# Patient Record
Sex: MALE | Race: WHITE | Employment: OTHER | URBAN - METROPOLITAN AREA
[De-identification: names, ages, dates, MRNs, and addresses within clinical notes are randomized per-mention and may not be internally consistent; named-entity substitution may affect disease eponyms.]

---

## 2018-12-26 ENCOUNTER — TELEPHONE (OUTPATIENT)
Dept: ADMINISTRATIVE | Age: 58
End: 2018-12-26

## 2019-01-17 ENCOUNTER — TELEPHONE (OUTPATIENT)
Dept: CARDIOLOGY CLINIC | Age: 59
End: 2019-01-17

## 2019-01-17 PROBLEM — I47.1 PSVT (PAROXYSMAL SUPRAVENTRICULAR TACHYCARDIA) (HCC): Status: ACTIVE | Noted: 2019-01-17

## 2019-01-17 PROBLEM — E78.2 MIXED HYPERLIPIDEMIA: Status: ACTIVE | Noted: 2019-01-17

## 2019-01-17 PROBLEM — J44.9 COPD (CHRONIC OBSTRUCTIVE PULMONARY DISEASE) (HCC): Status: ACTIVE | Noted: 2019-01-17

## 2019-01-17 PROBLEM — I47.10 PSVT (PAROXYSMAL SUPRAVENTRICULAR TACHYCARDIA): Status: ACTIVE | Noted: 2019-01-17

## 2019-01-17 PROBLEM — R07.2 PRECORDIAL PAIN: Status: ACTIVE | Noted: 2019-01-17

## 2019-01-17 PROBLEM — I10 ESSENTIAL HYPERTENSION: Status: ACTIVE | Noted: 2019-01-17

## 2019-03-28 ENCOUNTER — TELEPHONE (OUTPATIENT)
Dept: CARDIOLOGY CLINIC | Age: 59
End: 2019-03-28

## 2019-04-22 ENCOUNTER — TELEPHONE (OUTPATIENT)
Dept: ADMINISTRATIVE | Age: 59
End: 2019-04-22

## 2019-04-22 NOTE — TELEPHONE ENCOUNTER
Schedule with dr Mack Constantino on 4/29 at 9:00 am for intermitant cp and bp meds    Cardiac hx scanned   Referral attached  Records rec'd-scanned

## 2019-04-29 ENCOUNTER — OFFICE VISIT (OUTPATIENT)
Dept: CARDIOLOGY CLINIC | Age: 59
End: 2019-04-29
Payer: COMMERCIAL

## 2019-04-29 VITALS
HEART RATE: 71 BPM | BODY MASS INDEX: 33.34 KG/M2 | SYSTOLIC BLOOD PRESSURE: 118 MMHG | RESPIRATION RATE: 16 BRPM | DIASTOLIC BLOOD PRESSURE: 70 MMHG | HEIGHT: 68 IN | WEIGHT: 220 LBS

## 2019-04-29 DIAGNOSIS — R06.02 SOB (SHORTNESS OF BREATH): ICD-10-CM

## 2019-04-29 DIAGNOSIS — I10 ESSENTIAL HYPERTENSION: ICD-10-CM

## 2019-04-29 DIAGNOSIS — E11.8 TYPE 2 DIABETES MELLITUS WITH COMPLICATION, UNSPECIFIED WHETHER LONG TERM INSULIN USE: ICD-10-CM

## 2019-04-29 DIAGNOSIS — R07.2 PRECORDIAL PAIN: Primary | ICD-10-CM

## 2019-04-29 DIAGNOSIS — E78.00 PURE HYPERCHOLESTEROLEMIA: ICD-10-CM

## 2019-04-29 PROCEDURE — 93000 ELECTROCARDIOGRAM COMPLETE: CPT | Performed by: INTERNAL MEDICINE

## 2019-04-29 PROCEDURE — 1036F TOBACCO NON-USER: CPT | Performed by: INTERNAL MEDICINE

## 2019-04-29 PROCEDURE — 99204 OFFICE O/P NEW MOD 45 MIN: CPT | Performed by: INTERNAL MEDICINE

## 2019-04-29 PROCEDURE — 3017F COLORECTAL CA SCREEN DOC REV: CPT | Performed by: INTERNAL MEDICINE

## 2019-04-29 PROCEDURE — G8427 DOCREV CUR MEDS BY ELIG CLIN: HCPCS | Performed by: INTERNAL MEDICINE

## 2019-04-29 PROCEDURE — 2022F DILAT RTA XM EVC RTNOPTHY: CPT | Performed by: INTERNAL MEDICINE

## 2019-04-29 PROCEDURE — 3046F HEMOGLOBIN A1C LEVEL >9.0%: CPT | Performed by: INTERNAL MEDICINE

## 2019-04-29 PROCEDURE — G8417 CALC BMI ABV UP PARAM F/U: HCPCS | Performed by: INTERNAL MEDICINE

## 2019-04-29 RX ORDER — SPIRONOLACTONE 25 MG/1
TABLET ORAL
Refills: 1 | COMMUNITY
Start: 2019-04-21 | End: 2021-10-15

## 2019-04-29 RX ORDER — GLUCOSAMINE/CHONDR SU A SOD 750-600 MG
TABLET ORAL
Refills: 0 | COMMUNITY
Start: 2019-04-24 | End: 2021-10-15

## 2019-04-29 RX ORDER — ALCOHOL 62 ML/100ML
LIQUID TOPICAL
Refills: 1 | COMMUNITY
Start: 2019-04-24 | End: 2021-10-15

## 2019-04-29 RX ORDER — ATORVASTATIN CALCIUM 20 MG/1
20 TABLET, FILM COATED ORAL DAILY
Refills: 0 | COMMUNITY
Start: 2019-04-21 | End: 2021-10-15

## 2019-04-29 RX ORDER — FLUTICASONE PROPIONATE 50 MCG
1 SPRAY, SUSPENSION (ML) NASAL DAILY
Refills: 0 | COMMUNITY
Start: 2019-04-21 | End: 2021-10-15

## 2019-04-29 RX ORDER — SERTRALINE HYDROCHLORIDE 100 MG/1
TABLET, FILM COATED ORAL
Refills: 1 | COMMUNITY
Start: 2019-04-21 | End: 2021-10-15

## 2019-04-29 RX ORDER — OMEPRAZOLE 40 MG/1
CAPSULE, DELAYED RELEASE ORAL
Refills: 1 | COMMUNITY
Start: 2019-04-21 | End: 2021-10-15

## 2019-04-29 RX ORDER — NAPROXEN 500 MG/1
500 TABLET ORAL 2 TIMES DAILY WITH MEALS
Refills: 0 | COMMUNITY
Start: 2019-04-21 | End: 2021-10-15

## 2019-04-29 SDOH — HEALTH STABILITY: MENTAL HEALTH: HOW OFTEN DO YOU HAVE A DRINK CONTAINING ALCOHOL?: NEVER

## 2019-04-29 NOTE — PROGRESS NOTES
CHIEF COMPLAINT: CP/SOB/Palpitations    HISTORY OF PRESENT ILLNESS: Patient is a 66 y.o. male seen at the request of No primary care provider on file. Patient complains of chest pain. Onset was several months ago, with unchanged course since that time. The patient describes the pain as intermittent, precordial in nature, radiates to the left shoulder. Patient rates pain as a moderate in intensity. Associated symptoms are chest pain and dyspnea. Aggravating factors are exercise. Alleviating factors are: rest. Patient's cardiac risk factors are advanced age (older than 54 for men, 72 for women), diabetes mellitus, dyslipidemia, family history of premature cardiovascular disease, hypertension, male gender, obesity (BMI >= 30 kg/m2), sedentary lifestyle and smoking/ tobacco exposure.       Past Medical History:   Diagnosis Date    Diabetes mellitus ([facility] [address]     Hyperlipidemia     Hypertension        Patient Active Problem List   Diagnosis    COPD (chronic obstructive pulmonary disease) (HCC)    Precordial pain    Essential hypertension    Mixed hyperlipidemia    PSVT (paroxysmal supraventricular tachycardia) (HCC)    Hypertension    Hyperlipidemia    Diabetes mellitus (HCC)       No Known Allergies    Current Outpatient Medications   Medication Sig Dispense Refill    RA LORATADINE 10 MG tablet   1    metoprolol tartrate (LOPRESSOR) 25 MG tablet take 1 tablet by mouth twice a day  1    sertraline (ZOLOFT) 100 MG tablet take 1 and 1/2 tablets by mouth every evening  1    spironolactone (ALDACTONE) 25 MG tablet take 1 tablet by mouth once daily  1    metFORMIN (GLUCOPHAGE) 1000 MG tablet Take 1,000 mg by mouth 2 times daily (with meals)   0    omeprazole (PRILOSEC) 40 MG delayed release capsule take 1 capsule by mouth once daily before meals  1    naproxen (NAPROSYN) 500 MG tablet Take 500 mg by mouth 2 times daily (with meals)   0    RA VITAMIN D-3 2000 units CAPS   0    ipratropium (ATROVENT) 0.02 % nebulizer solution inhale contents of 1 vial in nebulizer every 6 hours if needed  1    fluticasone (FLONASE) 50 MCG/ACT nasal spray 1 spray by Nasal route daily   0    atorvastatin (LIPITOR) 20 MG tablet 20 mg daily   0    aspirin 81 MG tablet Take 81 mg by mouth daily      Tiotropium Bromide-Olodaterol (STIOLTO RESPIMAT) 2.5-2.5 MCG/ACT AERS Inhale into the lungs       No current facility-administered medications for this visit. Social History     Socioeconomic History    Marital status: Single     Spouse name: Not on file    Number of children: Not on file    Years of education: Not on file    Highest education level: Not on file   Occupational History    Not on file   Social Needs    Financial resource strain: Not on file    Food insecurity:     Worry: Not on file     Inability: Not on file    Transportation needs:     Medical: Not on file     Non-medical: Not on file   Tobacco Use    Smoking status: Former Smoker     Last attempt to quit: 2019     Years since quittin.2    Smokeless tobacco: Never Used   Substance and Sexual Activity    Alcohol use: Never     Frequency: Never    Drug use: Never    Sexual activity: Not on file   Lifestyle    Physical activity:     Days per week: Not on file     Minutes per session: Not on file    Stress: Not on file   Relationships    Social connections:     Talks on phone: Not on file     Gets together: Not on file     Attends Hoahaoism service: Not on file     Active member of club or organization: Not on file     Attends meetings of clubs or organizations: Not on file     Relationship status: Not on file    Intimate partner violence:     Fear of current or ex partner: Not on file     Emotionally abused: Not on file     Physically abused: Not on file     Forced sexual activity: Not on file   Other Topics Concern    Not on file   Social History Narrative    Not on file       History reviewed. No pertinent family history.     Review of (paroxysmal supraventricular tachycardia) ([facility] [address]    Hypertension    Hyperlipidemia    Diabetes mellitus ([facility] [address]     1. Chest pain/SOB:     Stress and echo. ASA/BB/statin. 2. Palpitations: Observe. 3. COPD    4. DM: Per PCP. 5. HTN: Observe. 6. Lipids: Statin. William Nation D.O.   Cardiologist  Cardiology, [facility]

## 2019-05-09 ENCOUNTER — TELEPHONE (OUTPATIENT)
Dept: CARDIOLOGY | Age: 59
End: 2019-05-09

## 2019-06-03 ENCOUNTER — TELEPHONE (OUTPATIENT)
Dept: CARDIOLOGY CLINIC | Age: 59
End: 2019-06-03

## 2019-07-01 ENCOUNTER — TELEPHONE (OUTPATIENT)
Dept: CARDIOLOGY | Age: 59
End: 2019-07-01

## 2020-11-03 PROBLEM — I10 ESSENTIAL HYPERTENSION: Status: RESOLVED | Noted: 2019-01-17 | Resolved: 2020-11-03

## 2021-10-14 ENCOUNTER — APPOINTMENT (OUTPATIENT)
Dept: GENERAL RADIOLOGY | Age: 61
DRG: 710 | End: 2021-10-14
Payer: COMMERCIAL

## 2021-10-14 PROCEDURE — 99282 EMERGENCY DEPT VISIT SF MDM: CPT

## 2021-10-14 PROCEDURE — 73110 X-RAY EXAM OF WRIST: CPT

## 2021-10-14 PROCEDURE — 73060 X-RAY EXAM OF HUMERUS: CPT

## 2021-10-14 PROCEDURE — 73080 X-RAY EXAM OF ELBOW: CPT

## 2021-10-14 PROCEDURE — 73130 X-RAY EXAM OF HAND: CPT

## 2021-10-14 PROCEDURE — 73090 X-RAY EXAM OF FOREARM: CPT

## 2021-10-14 ASSESSMENT — PAIN SCALES - GENERAL: PAINLEVEL_OUTOF10: 8

## 2021-10-15 ENCOUNTER — ANESTHESIA EVENT (OUTPATIENT)
Dept: OPERATING ROOM | Age: 61
DRG: 710 | End: 2021-10-15
Payer: COMMERCIAL

## 2021-10-15 ENCOUNTER — HOSPITAL ENCOUNTER (INPATIENT)
Age: 61
LOS: 4 days | Discharge: HOME HEALTH | DRG: 710 | End: 2021-10-19
Attending: EMERGENCY MEDICINE | Admitting: INTERNAL MEDICINE
Payer: COMMERCIAL

## 2021-10-15 ENCOUNTER — ANESTHESIA (OUTPATIENT)
Dept: OPERATING ROOM | Age: 61
DRG: 710 | End: 2021-10-15
Payer: COMMERCIAL

## 2021-10-15 VITALS
OXYGEN SATURATION: 99 % | TEMPERATURE: 97.5 F | RESPIRATION RATE: 16 BRPM | DIASTOLIC BLOOD PRESSURE: 76 MMHG | SYSTOLIC BLOOD PRESSURE: 150 MMHG

## 2021-10-15 DIAGNOSIS — A41.9 SEPSIS DUE TO CELLULITIS (HCC): ICD-10-CM

## 2021-10-15 DIAGNOSIS — G89.18 POST-OPERATIVE PAIN: ICD-10-CM

## 2021-10-15 DIAGNOSIS — S59.902A INJURY OF LEFT ELBOW, INITIAL ENCOUNTER: Primary | ICD-10-CM

## 2021-10-15 DIAGNOSIS — L03.90 SEPSIS DUE TO CELLULITIS (HCC): ICD-10-CM

## 2021-10-15 DIAGNOSIS — L03.114 LEFT ARM CELLULITIS: ICD-10-CM

## 2021-10-15 LAB
ANION GAP SERPL CALCULATED.3IONS-SCNC: 18 MMOL/L (ref 7–16)
BASOPHILS ABSOLUTE: 0.04 E9/L (ref 0–0.2)
BASOPHILS RELATIVE PERCENT: 0.3 % (ref 0–2)
BUN BLDV-MCNC: 11 MG/DL (ref 6–20)
C-REACTIVE PROTEIN: 22.8 MG/DL (ref 0–0.4)
CALCIUM SERPL-MCNC: 9.4 MG/DL (ref 8.6–10.2)
CHLORIDE BLD-SCNC: 93 MMOL/L (ref 98–107)
CO2: 17 MMOL/L (ref 22–29)
CREAT SERPL-MCNC: 0.8 MG/DL (ref 0.7–1.2)
EOSINOPHILS ABSOLUTE: 0.03 E9/L (ref 0.05–0.5)
EOSINOPHILS RELATIVE PERCENT: 0.2 % (ref 0–6)
GFR AFRICAN AMERICAN: >60
GFR NON-AFRICAN AMERICAN: >60 ML/MIN/1.73
GLUCOSE BLD-MCNC: 128 MG/DL (ref 74–99)
HBA1C MFR BLD: 5.7 % (ref 4–5.6)
HCT VFR BLD CALC: 40.7 % (ref 37–54)
HEMOGLOBIN: 13.4 G/DL (ref 12.5–16.5)
IMMATURE GRANULOCYTES #: 0.14 E9/L
IMMATURE GRANULOCYTES %: 1 % (ref 0–5)
LYMPHOCYTES ABSOLUTE: 1.69 E9/L (ref 1.5–4)
LYMPHOCYTES RELATIVE PERCENT: 12.3 % (ref 20–42)
MCH RBC QN AUTO: 31.1 PG (ref 26–35)
MCHC RBC AUTO-ENTMCNC: 32.9 % (ref 32–34.5)
MCV RBC AUTO: 94.4 FL (ref 80–99.9)
METER GLUCOSE: 126 MG/DL (ref 74–99)
METER GLUCOSE: 135 MG/DL (ref 74–99)
METER GLUCOSE: 197 MG/DL (ref 74–99)
MONOCYTES ABSOLUTE: 1.59 E9/L (ref 0.1–0.95)
MONOCYTES RELATIVE PERCENT: 11.6 % (ref 2–12)
NEUTROPHILS ABSOLUTE: 10.2 E9/L (ref 1.8–7.3)
NEUTROPHILS RELATIVE PERCENT: 74.6 % (ref 43–80)
PDW BLD-RTO: 14.1 FL (ref 11.5–15)
PLATELET # BLD: 236 E9/L (ref 130–450)
PMV BLD AUTO: 9.4 FL (ref 7–12)
POTASSIUM REFLEX MAGNESIUM: 5.2 MMOL/L (ref 3.5–5)
PROCALCITONIN: 0.08 NG/ML (ref 0–0.08)
RBC # BLD: 4.31 E12/L (ref 3.8–5.8)
REASON FOR REJECTION: NORMAL
REJECTED TEST: NORMAL
SEDIMENTATION RATE, ERYTHROCYTE: 58 MM/HR (ref 0–15)
SODIUM BLD-SCNC: 128 MMOL/L (ref 132–146)
WBC # BLD: 13.7 E9/L (ref 4.5–11.5)

## 2021-10-15 PROCEDURE — 2580000003 HC RX 258: Performed by: PHYSICIAN ASSISTANT

## 2021-10-15 PROCEDURE — 99254 IP/OBS CNSLTJ NEW/EST MOD 60: CPT | Performed by: ORTHOPAEDIC SURGERY

## 2021-10-15 PROCEDURE — 6360000002 HC RX W HCPCS: Performed by: SPECIALIST

## 2021-10-15 PROCEDURE — 6360000002 HC RX W HCPCS

## 2021-10-15 PROCEDURE — 87147 CULTURE TYPE IMMUNOLOGIC: CPT

## 2021-10-15 PROCEDURE — 2580000003 HC RX 258: Performed by: EMERGENCY MEDICINE

## 2021-10-15 PROCEDURE — 87206 SMEAR FLUORESCENT/ACID STAI: CPT

## 2021-10-15 PROCEDURE — 89051 BODY FLUID CELL COUNT: CPT

## 2021-10-15 PROCEDURE — 0MB40ZZ EXCISION OF LEFT ELBOW BURSA AND LIGAMENT, OPEN APPROACH: ICD-10-PCS | Performed by: ORTHOPAEDIC SURGERY

## 2021-10-15 PROCEDURE — 83036 HEMOGLOBIN GLYCOSYLATED A1C: CPT

## 2021-10-15 PROCEDURE — 85025 COMPLETE CBC W/AUTO DIFF WBC: CPT

## 2021-10-15 PROCEDURE — 6360000002 HC RX W HCPCS: Performed by: EMERGENCY MEDICINE

## 2021-10-15 PROCEDURE — 87070 CULTURE OTHR SPECIMN AEROBIC: CPT

## 2021-10-15 PROCEDURE — 6370000000 HC RX 637 (ALT 250 FOR IP): Performed by: PHYSICIAN ASSISTANT

## 2021-10-15 PROCEDURE — 36415 COLL VENOUS BLD VENIPUNCTURE: CPT

## 2021-10-15 PROCEDURE — 7100000000 HC PACU RECOVERY - FIRST 15 MIN: Performed by: ORTHOPAEDIC SURGERY

## 2021-10-15 PROCEDURE — 2500000003 HC RX 250 WO HCPCS: Performed by: ORTHOPAEDIC SURGERY

## 2021-10-15 PROCEDURE — 87102 FUNGUS ISOLATION CULTURE: CPT

## 2021-10-15 PROCEDURE — 6360000002 HC RX W HCPCS: Performed by: NURSE PRACTITIONER

## 2021-10-15 PROCEDURE — 80048 BASIC METABOLIC PNL TOTAL CA: CPT

## 2021-10-15 PROCEDURE — 87205 SMEAR GRAM STAIN: CPT

## 2021-10-15 PROCEDURE — 2709999900 HC NON-CHARGEABLE SUPPLY: Performed by: ORTHOPAEDIC SURGERY

## 2021-10-15 PROCEDURE — 3600000012 HC SURGERY LEVEL 2 ADDTL 15MIN: Performed by: ORTHOPAEDIC SURGERY

## 2021-10-15 PROCEDURE — 86140 C-REACTIVE PROTEIN: CPT

## 2021-10-15 PROCEDURE — 82962 GLUCOSE BLOOD TEST: CPT

## 2021-10-15 PROCEDURE — 99223 1ST HOSP IP/OBS HIGH 75: CPT | Performed by: INTERNAL MEDICINE

## 2021-10-15 PROCEDURE — 87077 CULTURE AEROBIC IDENTIFY: CPT

## 2021-10-15 PROCEDURE — 6360000002 HC RX W HCPCS: Performed by: INTERNAL MEDICINE

## 2021-10-15 PROCEDURE — 87015 SPECIMEN INFECT AGNT CONCNTJ: CPT

## 2021-10-15 PROCEDURE — 6370000000 HC RX 637 (ALT 250 FOR IP): Performed by: INTERNAL MEDICINE

## 2021-10-15 PROCEDURE — 3700000000 HC ANESTHESIA ATTENDED CARE: Performed by: ORTHOPAEDIC SURGERY

## 2021-10-15 PROCEDURE — 2580000003 HC RX 258: Performed by: INTERNAL MEDICINE

## 2021-10-15 PROCEDURE — 87186 SC STD MICRODIL/AGAR DIL: CPT

## 2021-10-15 PROCEDURE — 2580000003 HC RX 258: Performed by: SPECIALIST

## 2021-10-15 PROCEDURE — 89060 EXAM SYNOVIAL FLUID CRYSTALS: CPT

## 2021-10-15 PROCEDURE — 90471 IMMUNIZATION ADMIN: CPT | Performed by: NURSE PRACTITIONER

## 2021-10-15 PROCEDURE — 6360000002 HC RX W HCPCS: Performed by: ANESTHESIOLOGY

## 2021-10-15 PROCEDURE — 87116 MYCOBACTERIA CULTURE: CPT

## 2021-10-15 PROCEDURE — 3600000002 HC SURGERY LEVEL 2 BASE: Performed by: ORTHOPAEDIC SURGERY

## 2021-10-15 PROCEDURE — 85651 RBC SED RATE NONAUTOMATED: CPT

## 2021-10-15 PROCEDURE — 2500000003 HC RX 250 WO HCPCS

## 2021-10-15 PROCEDURE — 7100000001 HC PACU RECOVERY - ADDTL 15 MIN: Performed by: ORTHOPAEDIC SURGERY

## 2021-10-15 PROCEDURE — 3700000001 HC ADD 15 MINUTES (ANESTHESIA): Performed by: ORTHOPAEDIC SURGERY

## 2021-10-15 PROCEDURE — 24105 EXCISION OLECRANON BURSA: CPT | Performed by: ORTHOPAEDIC SURGERY

## 2021-10-15 PROCEDURE — 90715 TDAP VACCINE 7 YRS/> IM: CPT | Performed by: NURSE PRACTITIONER

## 2021-10-15 PROCEDURE — 2580000003 HC RX 258: Performed by: ORTHOPAEDIC SURGERY

## 2021-10-15 PROCEDURE — 87040 BLOOD CULTURE FOR BACTERIA: CPT

## 2021-10-15 PROCEDURE — 84145 PROCALCITONIN (PCT): CPT

## 2021-10-15 PROCEDURE — 6370000000 HC RX 637 (ALT 250 FOR IP): Performed by: ANESTHESIOLOGY

## 2021-10-15 PROCEDURE — 1200000000 HC SEMI PRIVATE

## 2021-10-15 RX ORDER — DIPHENHYDRAMINE HYDROCHLORIDE 50 MG/ML
12.5 INJECTION INTRAMUSCULAR; INTRAVENOUS
Status: DISCONTINUED | OUTPATIENT
Start: 2021-10-15 | End: 2021-10-15

## 2021-10-15 RX ORDER — MORPHINE SULFATE 2 MG/ML
4 INJECTION, SOLUTION INTRAMUSCULAR; INTRAVENOUS EVERY 4 HOURS PRN
Status: DISCONTINUED | OUTPATIENT
Start: 2021-10-15 | End: 2021-10-19 | Stop reason: HOSPADM

## 2021-10-15 RX ORDER — SERTRALINE HYDROCHLORIDE 100 MG/1
100 TABLET, FILM COATED ORAL NIGHTLY
Status: DISCONTINUED | OUTPATIENT
Start: 2021-10-15 | End: 2021-10-19 | Stop reason: HOSPADM

## 2021-10-15 RX ORDER — SODIUM CHLORIDE 0.9 % (FLUSH) 0.9 %
5-40 SYRINGE (ML) INJECTION EVERY 12 HOURS SCHEDULED
Status: DISCONTINUED | OUTPATIENT
Start: 2021-10-15 | End: 2021-10-15

## 2021-10-15 RX ORDER — LIDOCAINE HYDROCHLORIDE 20 MG/ML
INJECTION, SOLUTION EPIDURAL; INFILTRATION; INTRACAUDAL; PERINEURAL PRN
Status: DISCONTINUED | OUTPATIENT
Start: 2021-10-15 | End: 2021-10-15 | Stop reason: SDUPTHER

## 2021-10-15 RX ORDER — FOLIC ACID 1 MG/1
1 TABLET ORAL DAILY
COMMUNITY

## 2021-10-15 RX ORDER — NICOTINE POLACRILEX 4 MG
15 LOZENGE BUCCAL PRN
Status: DISCONTINUED | OUTPATIENT
Start: 2021-10-15 | End: 2021-10-19 | Stop reason: HOSPADM

## 2021-10-15 RX ORDER — DEXAMETHASONE SODIUM PHOSPHATE 4 MG/ML
INJECTION, SOLUTION INTRA-ARTICULAR; INTRALESIONAL; INTRAMUSCULAR; INTRAVENOUS; SOFT TISSUE PRN
Status: DISCONTINUED | OUTPATIENT
Start: 2021-10-15 | End: 2021-10-15 | Stop reason: SDUPTHER

## 2021-10-15 RX ORDER — SPIRONOLACTONE 25 MG/1
25 TABLET ORAL DAILY
COMMUNITY

## 2021-10-15 RX ORDER — NEOSTIGMINE METHYLSULFATE 1 MG/ML
INJECTION, SOLUTION INTRAVENOUS PRN
Status: DISCONTINUED | OUTPATIENT
Start: 2021-10-15 | End: 2021-10-15 | Stop reason: SDUPTHER

## 2021-10-15 RX ORDER — SERTRALINE HYDROCHLORIDE 100 MG/1
100 TABLET, FILM COATED ORAL NIGHTLY
COMMUNITY

## 2021-10-15 RX ORDER — PROPOFOL 10 MG/ML
INJECTION, EMULSION INTRAVENOUS PRN
Status: DISCONTINUED | OUTPATIENT
Start: 2021-10-15 | End: 2021-10-15 | Stop reason: SDUPTHER

## 2021-10-15 RX ORDER — LISINOPRIL 5 MG/1
5 TABLET ORAL DAILY
Status: DISCONTINUED | OUTPATIENT
Start: 2021-10-15 | End: 2021-10-19 | Stop reason: HOSPADM

## 2021-10-15 RX ORDER — OXYCODONE HYDROCHLORIDE AND ACETAMINOPHEN 5; 325 MG/1; MG/1
2 TABLET ORAL EVERY 4 HOURS PRN
Status: DISCONTINUED | OUTPATIENT
Start: 2021-10-15 | End: 2021-10-19 | Stop reason: HOSPADM

## 2021-10-15 RX ORDER — OXYCODONE HYDROCHLORIDE AND ACETAMINOPHEN 5; 325 MG/1; MG/1
1 TABLET ORAL EVERY 6 HOURS PRN
Qty: 28 TABLET | Refills: 0 | Status: SHIPPED | OUTPATIENT
Start: 2021-10-15 | End: 2021-10-26 | Stop reason: SDUPTHER

## 2021-10-15 RX ORDER — MORPHINE SULFATE 2 MG/ML
2 INJECTION, SOLUTION INTRAMUSCULAR; INTRAVENOUS EVERY 4 HOURS PRN
Status: DISCONTINUED | OUTPATIENT
Start: 2021-10-15 | End: 2021-10-19 | Stop reason: HOSPADM

## 2021-10-15 RX ORDER — GLIPIZIDE 10 MG/1
10 TABLET ORAL
COMMUNITY

## 2021-10-15 RX ORDER — MEPERIDINE HYDROCHLORIDE 25 MG/ML
12.5 INJECTION INTRAMUSCULAR; INTRAVENOUS; SUBCUTANEOUS EVERY 10 MIN PRN
Status: DISCONTINUED | OUTPATIENT
Start: 2021-10-15 | End: 2021-10-15

## 2021-10-15 RX ORDER — SODIUM CHLORIDE 0.9 % (FLUSH) 0.9 %
5-40 SYRINGE (ML) INJECTION EVERY 12 HOURS SCHEDULED
Status: DISCONTINUED | OUTPATIENT
Start: 2021-10-15 | End: 2021-10-19 | Stop reason: HOSPADM

## 2021-10-15 RX ORDER — SODIUM CHLORIDE 9 MG/ML
25 INJECTION, SOLUTION INTRAVENOUS PRN
Status: DISCONTINUED | OUTPATIENT
Start: 2021-10-15 | End: 2021-10-19 | Stop reason: HOSPADM

## 2021-10-15 RX ORDER — GLIPIZIDE 5 MG/1
10 TABLET ORAL
Status: DISCONTINUED | OUTPATIENT
Start: 2021-10-15 | End: 2021-10-19 | Stop reason: HOSPADM

## 2021-10-15 RX ORDER — MIDAZOLAM HYDROCHLORIDE 1 MG/ML
INJECTION INTRAMUSCULAR; INTRAVENOUS PRN
Status: DISCONTINUED | OUTPATIENT
Start: 2021-10-15 | End: 2021-10-15 | Stop reason: SDUPTHER

## 2021-10-15 RX ORDER — ALBUTEROL SULFATE 2.5 MG/3ML
2.5 SOLUTION RESPIRATORY (INHALATION) EVERY 4 HOURS PRN
Status: DISCONTINUED | OUTPATIENT
Start: 2021-10-15 | End: 2021-10-19 | Stop reason: HOSPADM

## 2021-10-15 RX ORDER — ACETAMINOPHEN 325 MG/1
650 TABLET ORAL EVERY 6 HOURS PRN
Status: DISCONTINUED | OUTPATIENT
Start: 2021-10-15 | End: 2021-10-19 | Stop reason: HOSPADM

## 2021-10-15 RX ORDER — LISINOPRIL 5 MG/1
5 TABLET ORAL DAILY
COMMUNITY

## 2021-10-15 RX ORDER — SODIUM CHLORIDE 9 MG/ML
INJECTION, SOLUTION INTRAVENOUS CONTINUOUS
Status: DISCONTINUED | OUTPATIENT
Start: 2021-10-15 | End: 2021-10-15

## 2021-10-15 RX ORDER — ONDANSETRON 2 MG/ML
4 INJECTION INTRAMUSCULAR; INTRAVENOUS EVERY 6 HOURS PRN
Status: DISCONTINUED | OUTPATIENT
Start: 2021-10-15 | End: 2021-10-19 | Stop reason: HOSPADM

## 2021-10-15 RX ORDER — OXYCODONE HYDROCHLORIDE AND ACETAMINOPHEN 5; 325 MG/1; MG/1
1 TABLET ORAL EVERY 4 HOURS PRN
Status: DISCONTINUED | OUTPATIENT
Start: 2021-10-15 | End: 2021-10-19 | Stop reason: HOSPADM

## 2021-10-15 RX ORDER — FENTANYL CITRATE 50 UG/ML
INJECTION, SOLUTION INTRAMUSCULAR; INTRAVENOUS PRN
Status: DISCONTINUED | OUTPATIENT
Start: 2021-10-15 | End: 2021-10-15 | Stop reason: SDUPTHER

## 2021-10-15 RX ORDER — POLYETHYLENE GLYCOL 3350 17 G/17G
17 POWDER, FOR SOLUTION ORAL DAILY PRN
Status: DISCONTINUED | OUTPATIENT
Start: 2021-10-15 | End: 2021-10-19 | Stop reason: HOSPADM

## 2021-10-15 RX ORDER — FENTANYL CITRATE 50 UG/ML
25 INJECTION, SOLUTION INTRAMUSCULAR; INTRAVENOUS EVERY 5 MIN PRN
Status: DISCONTINUED | OUTPATIENT
Start: 2021-10-15 | End: 2021-10-15

## 2021-10-15 RX ORDER — SODIUM CHLORIDE 9 MG/ML
25 INJECTION, SOLUTION INTRAVENOUS PRN
Status: DISCONTINUED | OUTPATIENT
Start: 2021-10-15 | End: 2021-10-15

## 2021-10-15 RX ORDER — MONTELUKAST SODIUM 10 MG/1
10 TABLET ORAL NIGHTLY
COMMUNITY

## 2021-10-15 RX ORDER — DEXTROSE MONOHYDRATE 50 MG/ML
100 INJECTION, SOLUTION INTRAVENOUS PRN
Status: DISCONTINUED | OUTPATIENT
Start: 2021-10-15 | End: 2021-10-19 | Stop reason: HOSPADM

## 2021-10-15 RX ORDER — ACETAMINOPHEN 500 MG
500 TABLET ORAL ONCE
Status: COMPLETED | OUTPATIENT
Start: 2021-10-15 | End: 2021-10-15

## 2021-10-15 RX ORDER — PANTOPRAZOLE SODIUM 40 MG/1
40 TABLET, DELAYED RELEASE ORAL
Status: DISCONTINUED | OUTPATIENT
Start: 2021-10-16 | End: 2021-10-19 | Stop reason: HOSPADM

## 2021-10-15 RX ORDER — ACETAMINOPHEN 650 MG/1
650 SUPPOSITORY RECTAL EVERY 6 HOURS PRN
Status: DISCONTINUED | OUTPATIENT
Start: 2021-10-15 | End: 2021-10-19 | Stop reason: HOSPADM

## 2021-10-15 RX ORDER — GLYCOPYRROLATE 1 MG/5 ML
SYRINGE (ML) INTRAVENOUS PRN
Status: DISCONTINUED | OUTPATIENT
Start: 2021-10-15 | End: 2021-10-15 | Stop reason: SDUPTHER

## 2021-10-15 RX ORDER — PROMETHAZINE HYDROCHLORIDE 25 MG/ML
6.25 INJECTION, SOLUTION INTRAMUSCULAR; INTRAVENOUS PRN
Status: DISCONTINUED | OUTPATIENT
Start: 2021-10-15 | End: 2021-10-15

## 2021-10-15 RX ORDER — SODIUM CHLORIDE 0.9 % (FLUSH) 0.9 %
5-40 SYRINGE (ML) INJECTION PRN
Status: DISCONTINUED | OUTPATIENT
Start: 2021-10-15 | End: 2021-10-19 | Stop reason: HOSPADM

## 2021-10-15 RX ORDER — SODIUM CHLORIDE 0.9 % (FLUSH) 0.9 %
5-40 SYRINGE (ML) INJECTION PRN
Status: DISCONTINUED | OUTPATIENT
Start: 2021-10-15 | End: 2021-10-15

## 2021-10-15 RX ORDER — SODIUM CHLORIDE 9 MG/ML
INJECTION, SOLUTION INTRAVENOUS CONTINUOUS
Status: DISCONTINUED | OUTPATIENT
Start: 2021-10-15 | End: 2021-10-19 | Stop reason: HOSPADM

## 2021-10-15 RX ORDER — MONTELUKAST SODIUM 10 MG/1
10 TABLET ORAL NIGHTLY
Status: DISCONTINUED | OUTPATIENT
Start: 2021-10-15 | End: 2021-10-19 | Stop reason: HOSPADM

## 2021-10-15 RX ORDER — 0.9 % SODIUM CHLORIDE 0.9 %
1000 INTRAVENOUS SOLUTION INTRAVENOUS ONCE
Status: COMPLETED | OUTPATIENT
Start: 2021-10-15 | End: 2021-10-15

## 2021-10-15 RX ORDER — NAPROXEN 500 MG/1
500 TABLET ORAL 2 TIMES DAILY WITH MEALS
Status: ON HOLD | COMMUNITY
End: 2021-10-19 | Stop reason: HOSPADM

## 2021-10-15 RX ORDER — SUCCINYLCHOLINE/SOD CL,ISO/PF 200MG/10ML
SYRINGE (ML) INTRAVENOUS PRN
Status: DISCONTINUED | OUTPATIENT
Start: 2021-10-15 | End: 2021-10-15 | Stop reason: SDUPTHER

## 2021-10-15 RX ORDER — OXYCODONE HYDROCHLORIDE AND ACETAMINOPHEN 5; 325 MG/1; MG/1
1 TABLET ORAL PRN
Status: DISCONTINUED | OUTPATIENT
Start: 2021-10-15 | End: 2021-10-15

## 2021-10-15 RX ORDER — DEXTROSE MONOHYDRATE 25 G/50ML
12.5 INJECTION, SOLUTION INTRAVENOUS PRN
Status: DISCONTINUED | OUTPATIENT
Start: 2021-10-15 | End: 2021-10-19 | Stop reason: HOSPADM

## 2021-10-15 RX ORDER — ONDANSETRON 4 MG/1
4 TABLET, ORALLY DISINTEGRATING ORAL EVERY 8 HOURS PRN
Status: DISCONTINUED | OUTPATIENT
Start: 2021-10-15 | End: 2021-10-19 | Stop reason: HOSPADM

## 2021-10-15 RX ORDER — ONDANSETRON 2 MG/ML
INJECTION INTRAMUSCULAR; INTRAVENOUS PRN
Status: DISCONTINUED | OUTPATIENT
Start: 2021-10-15 | End: 2021-10-15 | Stop reason: SDUPTHER

## 2021-10-15 RX ORDER — BUPIVACAINE HYDROCHLORIDE AND EPINEPHRINE 5; 5 MG/ML; UG/ML
INJECTION, SOLUTION EPIDURAL; INTRACAUDAL; PERINEURAL PRN
Status: DISCONTINUED | OUTPATIENT
Start: 2021-10-15 | End: 2021-10-15 | Stop reason: ALTCHOICE

## 2021-10-15 RX ORDER — OMEPRAZOLE 40 MG/1
40 CAPSULE, DELAYED RELEASE ORAL DAILY
COMMUNITY

## 2021-10-15 RX ORDER — ALBUTEROL SULFATE 2.5 MG/3ML
2.5 SOLUTION RESPIRATORY (INHALATION) ONCE
Status: COMPLETED | OUTPATIENT
Start: 2021-10-15 | End: 2021-10-15

## 2021-10-15 RX ORDER — NICOTINE 21 MG/24HR
1 PATCH, TRANSDERMAL 24 HOURS TRANSDERMAL DAILY
Status: DISCONTINUED | OUTPATIENT
Start: 2021-10-15 | End: 2021-10-19 | Stop reason: HOSPADM

## 2021-10-15 RX ORDER — ROCURONIUM BROMIDE 10 MG/ML
INJECTION, SOLUTION INTRAVENOUS PRN
Status: DISCONTINUED | OUTPATIENT
Start: 2021-10-15 | End: 2021-10-15 | Stop reason: SDUPTHER

## 2021-10-15 RX ORDER — ASPIRIN 81 MG/1
81 TABLET, CHEWABLE ORAL DAILY
COMMUNITY

## 2021-10-15 RX ADMIN — FENTANYL CITRATE 50 MCG: 50 INJECTION, SOLUTION INTRAMUSCULAR; INTRAVENOUS at 14:39

## 2021-10-15 RX ADMIN — Medication 3 MG: at 14:54

## 2021-10-15 RX ADMIN — ROCURONIUM BROMIDE 10 MG: 10 INJECTION, SOLUTION INTRAVENOUS at 14:31

## 2021-10-15 RX ADMIN — VANCOMYCIN HYDROCHLORIDE 1250 MG: 10 INJECTION, POWDER, LYOPHILIZED, FOR SOLUTION INTRAVENOUS at 18:07

## 2021-10-15 RX ADMIN — PROPOFOL 50 MG: 10 INJECTION, EMULSION INTRAVENOUS at 14:38

## 2021-10-15 RX ADMIN — ENOXAPARIN SODIUM 40 MG: 40 INJECTION SUBCUTANEOUS at 08:39

## 2021-10-15 RX ADMIN — LIDOCAINE HYDROCHLORIDE 100 MG: 20 INJECTION, SOLUTION EPIDURAL; INFILTRATION; INTRACAUDAL; PERINEURAL at 14:06

## 2021-10-15 RX ADMIN — Medication 10 ML: at 20:44

## 2021-10-15 RX ADMIN — PROPOFOL 50 MG: 10 INJECTION, EMULSION INTRAVENOUS at 14:27

## 2021-10-15 RX ADMIN — FENTANYL CITRATE 50 MCG: 50 INJECTION, SOLUTION INTRAMUSCULAR; INTRAVENOUS at 14:25

## 2021-10-15 RX ADMIN — MIDAZOLAM 2 MG: 1 INJECTION INTRAMUSCULAR; INTRAVENOUS at 13:58

## 2021-10-15 RX ADMIN — ROCURONIUM BROMIDE 20 MG: 10 INJECTION, SOLUTION INTRAVENOUS at 14:20

## 2021-10-15 RX ADMIN — LISINOPRIL 5 MG: 5 TABLET ORAL at 17:48

## 2021-10-15 RX ADMIN — DEXAMETHASONE SODIUM PHOSPHATE 8 MG: 4 INJECTION, SOLUTION INTRAMUSCULAR; INTRAVENOUS at 14:20

## 2021-10-15 RX ADMIN — PROPOFOL 60 MG: 10 INJECTION, EMULSION INTRAVENOUS at 14:20

## 2021-10-15 RX ADMIN — Medication 0.6 MG: at 14:54

## 2021-10-15 RX ADMIN — CEFEPIME HYDROCHLORIDE 2000 MG: 2 INJECTION, POWDER, FOR SOLUTION INTRAVENOUS at 17:40

## 2021-10-15 RX ADMIN — ACETAMINOPHEN 500 MG: 500 TABLET ORAL at 12:26

## 2021-10-15 RX ADMIN — TETANUS TOXOID, REDUCED DIPHTHERIA TOXOID AND ACELLULAR PERTUSSIS VACCINE, ADSORBED 0.5 ML: 5; 2.5; 8; 8; 2.5 SUSPENSION INTRAMUSCULAR at 02:37

## 2021-10-15 RX ADMIN — VANCOMYCIN HYDROCHLORIDE 2000 MG: 10 INJECTION, POWDER, LYOPHILIZED, FOR SOLUTION INTRAVENOUS at 04:11

## 2021-10-15 RX ADMIN — SODIUM CHLORIDE: 9 INJECTION, SOLUTION INTRAVENOUS at 08:28

## 2021-10-15 RX ADMIN — SODIUM CHLORIDE 25 ML: 9 INJECTION, SOLUTION INTRAVENOUS at 08:34

## 2021-10-15 RX ADMIN — CEFEPIME HYDROCHLORIDE 2000 MG: 2 INJECTION, POWDER, FOR SOLUTION INTRAVENOUS at 03:34

## 2021-10-15 RX ADMIN — MONTELUKAST SODIUM 10 MG: 10 TABLET, FILM COATED ORAL at 20:43

## 2021-10-15 RX ADMIN — INSULIN LISPRO 1 UNITS: 100 INJECTION, SOLUTION INTRAVENOUS; SUBCUTANEOUS at 20:42

## 2021-10-15 RX ADMIN — METFORMIN HYDROCHLORIDE 1000 MG: 1000 TABLET ORAL at 17:48

## 2021-10-15 RX ADMIN — Medication 10 ML: at 08:36

## 2021-10-15 RX ADMIN — OXYCODONE AND ACETAMINOPHEN 1 TABLET: 5; 325 TABLET ORAL at 20:51

## 2021-10-15 RX ADMIN — ALBUTEROL SULFATE 2.5 MG: 2.5 SOLUTION RESPIRATORY (INHALATION) at 12:31

## 2021-10-15 RX ADMIN — PIPERACILLIN AND TAZOBACTAM 3375 MG: 3; .375 INJECTION, POWDER, LYOPHILIZED, FOR SOLUTION INTRAVENOUS at 08:34

## 2021-10-15 RX ADMIN — Medication 160 MG: at 14:06

## 2021-10-15 RX ADMIN — SODIUM CHLORIDE, PRESERVATIVE FREE 10 ML: 5 INJECTION INTRAVENOUS at 08:35

## 2021-10-15 RX ADMIN — SODIUM CHLORIDE 1000 ML: 9 INJECTION, SOLUTION INTRAVENOUS at 03:28

## 2021-10-15 RX ADMIN — SODIUM CHLORIDE: 9 INJECTION, SOLUTION INTRAVENOUS at 10:05

## 2021-10-15 RX ADMIN — GLIPIZIDE 10 MG: 5 TABLET ORAL at 17:48

## 2021-10-15 RX ADMIN — PROPOFOL 60 MG: 10 INJECTION, EMULSION INTRAVENOUS at 14:31

## 2021-10-15 RX ADMIN — SERTRALINE 100 MG: 100 TABLET, FILM COATED ORAL at 20:43

## 2021-10-15 RX ADMIN — ONDANSETRON 4 MG: 2 INJECTION INTRAMUSCULAR; INTRAVENOUS at 14:34

## 2021-10-15 RX ADMIN — METOPROLOL TARTRATE 25 MG: 25 TABLET, FILM COATED ORAL at 20:43

## 2021-10-15 RX ADMIN — PROPOFOL 200 MG: 10 INJECTION, EMULSION INTRAVENOUS at 14:06

## 2021-10-15 ASSESSMENT — PULMONARY FUNCTION TESTS
PIF_VALUE: 23
PIF_VALUE: 23
PIF_VALUE: 24
PIF_VALUE: 4
PIF_VALUE: 21
PIF_VALUE: 23
PIF_VALUE: 25
PIF_VALUE: 23
PIF_VALUE: 20
PIF_VALUE: 23
PIF_VALUE: 20
PIF_VALUE: 20
PIF_VALUE: 18
PIF_VALUE: 8
PIF_VALUE: 1
PIF_VALUE: 23
PIF_VALUE: 2
PIF_VALUE: 23
PIF_VALUE: 5
PIF_VALUE: 23
PIF_VALUE: 1
PIF_VALUE: 25
PIF_VALUE: 23
PIF_VALUE: 25
PIF_VALUE: 20
PIF_VALUE: 23
PIF_VALUE: 25
PIF_VALUE: 23
PIF_VALUE: 20
PIF_VALUE: 18
PIF_VALUE: 3
PIF_VALUE: 3
PIF_VALUE: 5
PIF_VALUE: 10
PIF_VALUE: 10
PIF_VALUE: 23
PIF_VALUE: 22
PIF_VALUE: 3
PIF_VALUE: 23
PIF_VALUE: 20
PIF_VALUE: 3
PIF_VALUE: 19
PIF_VALUE: 23
PIF_VALUE: 0

## 2021-10-15 ASSESSMENT — ENCOUNTER SYMPTOMS
BACK PAIN: 0
VOMITING: 0
WHEEZING: 0
EYE DISCHARGE: 0
ABDOMINAL PAIN: 0
SORE THROAT: 0
NAUSEA: 0
SINUS PRESSURE: 0
SHORTNESS OF BREATH: 0
EYE PAIN: 0
COUGH: 0
EYE REDNESS: 0
DIARRHEA: 0

## 2021-10-15 ASSESSMENT — PAIN SCALES - GENERAL
PAINLEVEL_OUTOF10: 2
PAINLEVEL_OUTOF10: 10
PAINLEVEL_OUTOF10: 5
PAINLEVEL_OUTOF10: 8
PAINLEVEL_OUTOF10: 8

## 2021-10-15 ASSESSMENT — PAIN DESCRIPTION - FREQUENCY
FREQUENCY: CONTINUOUS
FREQUENCY: CONTINUOUS

## 2021-10-15 ASSESSMENT — PAIN - FUNCTIONAL ASSESSMENT
PAIN_FUNCTIONAL_ASSESSMENT: PREVENTS OR INTERFERES WITH MANY ACTIVE NOT PASSIVE ACTIVITIES
PAIN_FUNCTIONAL_ASSESSMENT: PREVENTS OR INTERFERES SOME ACTIVE ACTIVITIES AND ADLS

## 2021-10-15 ASSESSMENT — PAIN DESCRIPTION - ONSET
ONSET: ON-GOING
ONSET: ON-GOING

## 2021-10-15 ASSESSMENT — PAIN DESCRIPTION - PROGRESSION
CLINICAL_PROGRESSION: NOT CHANGED
CLINICAL_PROGRESSION: NOT CHANGED

## 2021-10-15 ASSESSMENT — PAIN DESCRIPTION - LOCATION
LOCATION: ARM
LOCATION: ELBOW
LOCATION: ELBOW

## 2021-10-15 ASSESSMENT — PAIN DESCRIPTION - DESCRIPTORS
DESCRIPTORS: THROBBING
DESCRIPTORS: THROBBING

## 2021-10-15 ASSESSMENT — PAIN DESCRIPTION - PAIN TYPE
TYPE: ACUTE PAIN;SURGICAL PAIN
TYPE: SURGICAL PAIN
TYPE: ACUTE PAIN

## 2021-10-15 ASSESSMENT — PAIN DESCRIPTION - ORIENTATION
ORIENTATION: LEFT

## 2021-10-15 ASSESSMENT — LIFESTYLE VARIABLES: SMOKING_STATUS: 1

## 2021-10-15 ASSESSMENT — PAIN DESCRIPTION - DIRECTION: RADIATING_TOWARDS: DOWN ARM

## 2021-10-15 NOTE — ED NOTES
Sushma in respiratory advised of new respiratory orders at this time.      Celina Martínez RN  10/15/21 [id number]

## 2021-10-15 NOTE — ED NOTES
FIRST PROVIDER CONTACT ASSESSMENT NOTE      Department of Emergency Medicine   10/14/21  10:35 PM EDT    Chief Complaint: Arm Injury (left elbow after falling on wet grass last Friday)      History of Present Illness:   Dillon Loyola is a 63 y.o. male who presents to the ED for slipped on fell on wet grass landing his left arm on concrete. Denies any head strike or loss of conscious. Wound noted to the left elbow. Unknown last tetanus shot. Swelling warmth and redness noted to the elbow. Medical History:  has a past medical history of Diabetes mellitus ([address] Hyperlipidemia, and Hypertension. Surgical History:  has a past surgical history that includes hernia repair (2008 & 2013) and Appendectomy. Social History:  reports that he quit smoking about 2 years ago. He has never used smokeless tobacco. He reports that he does not drink alcohol and does not use drugs. Family History: family history is not on file. *ALLERGIES*     Patient has no known allergies.      Physical Exam:      VS:  /71   Pulse 116   Temp 97.3 °F (36.3 °C)   Resp 16   Ht 5' 7\" (1.702 m)   Wt 218 lb (98.9 kg)   SpO2 96%   BMI 34.14 kg/m²      Initial Plan of Care:  Initiate Treatment-Testing, Proceed toTreatment Area When Bed Available for ED Attending/MLP to Continue Care    -----------------END OF FIRST PROVIDER CONTACT ASSESSMENT NOTE--------------  Electronically signed by MARCELLA Neil CNP   DD: 10/14/21             MARCELLA Neil CNP  10/14/21 1921

## 2021-10-15 NOTE — PROGRESS NOTES
ID consult called to the answering service.   Electronically signed by Maria Luisa Alan RN on 10/15/2021 at 4:43 PM

## 2021-10-15 NOTE — PROGRESS NOTES
Multiple attempts made at calling [facility] to verify pt's home medications. Will continue to try.        [phone]      Electronically signed by Angelica Wooten RN on 10/15/2021 at 9:41 AM

## 2021-10-15 NOTE — CONSULTS
[address] [place] [address] [facility]    Consultation Note     Admit Date: 10/15/2021  2:14 AM    Reason for Consult:       Attending Physician:  Craig Cain MD     Chief Complaint: Left elbow swelling redness tenderness    HISTORY OF PRESENT ILLNESS: Pam Turk  The patient is a 63 y.o.  man known to the Infectious Diseases service. The patient is admitted through the emergency room on 10/14/2020 after traumatizing his left elbow continue to be red swollen concerns with infection. No chills fevers night sweats or nausea vomiting diarrhea. He did sustain a laceration left elbow there was drainage as well. Initially cefepime and Vanco started since then switched to Zosyn. C-reactive protein was 22.8 sed rate was 58 blood culture pending. On admission his white count was 13.7; Creatinine 11 and 0.8    Past Medical History:        Diagnosis Date    Diabetes mellitus ([facility] [address]     Hyperlipidemia     Hypertension      Past Surgical History:        Procedure Laterality Date    APPENDECTOMY      HERNIA REPAIR  2008 & 2013     Current Medications:   Scheduled Meds:   sodium chloride flush  5-40 mL IntraVENous 2 times per day    enoxaparin  40 mg SubCUTAneous Daily    insulin lispro  0-6 Units SubCUTAneous TID WC    insulin lispro  0-3 Units SubCUTAneous Nightly    influenza virus vaccine  0.5 mL IntraMUSCular Prior to discharge     Continuous Infusions:   dextrose      sodium chloride 700 mL/hr at 10/15/21 1454    sodium chloride 75 mL/hr at 10/15/21 0828     PRN Meds:glucose, dextrose, glucagon (rDNA), dextrose, sodium chloride flush, sodium chloride, ondansetron **OR** ondansetron, polyethylene glycol, acetaminophen **OR** acetaminophen, albuterol, morphine **OR** morphine, oxyCODONE-acetaminophen **OR** oxyCODONE-acetaminophen    Allergies:  Patient has no known allergies.     Social History:   Social History     Socioeconomic History    Marital status: Single     Spouse name: Not on file    Number of children: Not on file    Years of education: Not on file    Highest education level: Not on file   Occupational History    Not on file   Tobacco Use    Smoking status: Former Smoker     Quit date: 2019     Years since quittin.7    Smokeless tobacco: Never Used   Vaping Use    Vaping Use: Every day    Substances: Always   Substance and Sexual Activity    Alcohol use: Never    Drug use: Never    Sexual activity: Not on file   Other Topics Concern    Not on file   Social History Narrative    Not on file     Social Determinants of Health     Financial Resource Strain:     Difficulty of Paying Living Expenses:    Food Insecurity:     Worried About Running Out of Food in the Last Year:     [address] in the Last Year:    Transportation Needs:     Lack of Transportation (Medical):  Lack of Transportation (Non-Medical):    Physical Activity:     Days of Exercise per Week:     Minutes of Exercise per Session:    Stress:     Feeling of Stress :    Social Connections:     Frequency of Communication with Friends and Family:     Frequency of Social Gatherings with Friends and Family:     Attends Confucianism Services:     Active Member of Clubs or Organizations:     Attends Club or Organization Meetings:     Marital Status:    Intimate Partner Violence:     Fear of Current or Ex-Partner:     Emotionally Abused:     Physically Abused:     Sexually Abused: Tobacco: No  Alcohol: No  Pets: No  Travel: No    Family History:   No family history on file. . Otherwise non-pertinent to the chief complaint. REVIEW OF SYSTEMS:    CONSTITUTIONAL:  No chills, fevers or night sweats. No loss of weight. EYES:  No double vision or drainage from eyes, ears or throat. HEENT:  No neck stiffness. No dysphagia. No drainage from eyes, ears or throat  RESPIRATORY:  No cough, productive sputum or hemoptysis.    CARDIOVASCULAR:  No chest pain, palpitations, orthopnea or dyspnea on exertion. GASTROINTESTINAL:  No nausea, vomiting, diarrhea or constipation or hematochezia   GENITOURINARY:  No frequency burning dysuria or hematuria. INTEGUMENT/BREAST:  No rash or breast masses. HEMATOLOGIC/LYMPHATIC:  No lymphadenopathy or blood dyscrasics. ALLERGIC/IMMUNOLOGIC:  No anaphylaxis. ENDOCRINE:  No polyuria or polydipsia or temperature intolerance. MUSCULOSKELETAL:  No myalgia or arthralgia. NEUROLOGICAL:  No focal motor sensory deficit. BEHAVIOR/PSYCH:  No psychosis. PHYSICAL EXAM:    Vitals:    /87   Pulse 91   Temp 98.2 °F (36.8 °C) (Oral)   Resp 18   Ht 5' 7\" (1.702 m)   Wt 213 lb 2 oz (96.7 kg)   SpO2 91%   BMI 33.38 kg/m²   Constitutional: The patient is awake, alert, and oriented. Skin: Warm and dry. No rashes were noted. No jaundice. HEENT: Eyes show round, and reactive pupils. Moist mucous membranes, no ulcerations, no thrush. Neck: Supple to movements. No lymphadenopathy. Chest: No use of accessory muscles to breathe. Symmetrical expansion. Auscultation reveals no wheezing, crackles, or rhonchi. Cardiovascular: S1 and S2 are rhythmic and regular. No murmurs appreciated. Abdomen: Positive bowel sounds to auscultation. Benign to palpation. No masses felt. No hepatosplenomegaly. Genitourinary:. C-reactive protein was 22.8 blood cultures are pending  Extremities: No clubbing, no cyanosis, no edema. Musculoskeletal: Equal and symmetrical upper and lower extremities except for left arm retractions.   Neurological: No focal  Lines: peripheral      CBC+dif:  Recent Labs     10/15/21  0234   WBC 13.7*   HGB 13.4   HCT 40.7   MCV 94.4      NEUTROABS 10.20*     Lab Results   Component Value Date    CRP 22.8 (H) 10/15/2021     No results found for: CRPHS  Lab Results   Component Value Date    SEDRATE 58 (H) 10/15/2021     No results found for: ALT, AST, GGT, ALKPHOS, BILITOT  Lab Results   Component Value Date     10/15/2021    K 5.2 10/15/2021 CL 93 10/15/2021    CO2 17 10/15/2021    BUN 11 10/15/2021    CREATININE 0.8 10/15/2021    GFRAA >60 10/15/2021    LABGLOM >60 10/15/2021    GLUCOSE 128 10/15/2021    CALCIUM 9.4 10/15/2021       No results found for: PROTIME, INR    No results found for: TSH    No results found for: NITRITE, COLORU, PHUR, LABCAST, WBCUA, RBCUA, MUCUS, TRICHOMONAS, YEAST, BACTERIA, CLARITYU, SPECGRAV, LEUKOCYTESUR, UROBILINOGEN, BILIRUBINUR, BLOODU, GLUCOSEU, AMORPHOUS    No results found for: YUQ8DRP, BEART, N6ICFYVK, PHART, THGBART, VLU8UIN, PO2ART, JNJ9HZO  Radiology:  XR HUMERUS LEFT (MIN 2 VIEWS)   Final Result   No acute fracture or malalignment of the left humerus, left elbow, left   forearm, left wrist or left hand. Elbow joint effusion. Soft tissue swelling from distal upper arm to at least the MCP joints, as   described, with marked swelling dorsal to the metacarpals, with likely palmar   swelling as well. Small amount of soft tissue emphysema dorsal to the elbow, compatible with an   element of laceration or puncture wound in the context of trauma history. No generalized/disseminated soft tissue emphysema to suggest gas-forming   infection. XR ELBOW LEFT (MIN 3 VIEWS)   Final Result   No acute fracture or malalignment of the left humerus, left elbow, left   forearm, left wrist or left hand. Elbow joint effusion. Soft tissue swelling from distal upper arm to at least the MCP joints, as   described, with marked swelling dorsal to the metacarpals, with likely palmar   swelling as well. Small amount of soft tissue emphysema dorsal to the elbow, compatible with an   element of laceration or puncture wound in the context of trauma history. No generalized/disseminated soft tissue emphysema to suggest gas-forming   infection.          XR RADIUS ULNA LEFT (2 VIEWS)   Final Result   No acute fracture or malalignment of the left humerus, left elbow, left   forearm, left wrist or left hand.      Elbow joint effusion. Soft tissue swelling from distal upper arm to at least the MCP joints, as   described, with marked swelling dorsal to the metacarpals, with likely palmar   swelling as well. Small amount of soft tissue emphysema dorsal to the elbow, compatible with an   element of laceration or puncture wound in the context of trauma history. No generalized/disseminated soft tissue emphysema to suggest gas-forming   infection. XR WRIST LEFT (MIN 3 VIEWS)   Final Result   No acute fracture or malalignment of the left humerus, left elbow, left   forearm, left wrist or left hand. Elbow joint effusion. Soft tissue swelling from distal upper arm to at least the MCP joints, as   described, with marked swelling dorsal to the metacarpals, with likely palmar   swelling as well. Small amount of soft tissue emphysema dorsal to the elbow, compatible with an   element of laceration or puncture wound in the context of trauma history. No generalized/disseminated soft tissue emphysema to suggest gas-forming   infection. XR HAND LEFT (MIN 3 VIEWS)   Final Result   No acute fracture or malalignment of the left humerus, left elbow, left   forearm, left wrist or left hand. Elbow joint effusion. Soft tissue swelling from distal upper arm to at least the MCP joints, as   described, with marked swelling dorsal to the metacarpals, with likely palmar   swelling as well. Small amount of soft tissue emphysema dorsal to the elbow, compatible with an   element of laceration or puncture wound in the context of trauma history. No generalized/disseminated soft tissue emphysema to suggest gas-forming   infection. Microbiology:  Pending  No results for input(s): BC in the last 72 hours. No results for input(s): ORG in the last 72 hours. No results for input(s): Ana Paula Alexx in the last 72 hours.   No results for input(s): STREPNEUMAGU in the last 72 hours. No results for input(s): LP1UAG in the last 72 hours. No results for input(s): ASO in the last 72 hours. No results for input(s): CULTRESP in the last 72 hours. Assessment:  · Bursitis with cellulitis and infected bursa    Plan:    · Cont cefepime 2 g IV every 8  · Vancomycin-pharmacy to dose  · PICC line in the morning  · Check cultures  · Baseline ESR, CRP  · Monitor labs  · Will follow with you    Thank you for having us see this patient in consultation. I will be discussing this case with the treating physicians.       Electronically signed by Alec Carroll MD on 10/15/2021 at 4:40 PM

## 2021-10-15 NOTE — OP NOTE
[id number] [address]                                OPERATIVE REPORT    PATIENT NAME: Josi Crowe                     :        1960  MED REC NO:   [id number]                            ROOM:       8749  ACCOUNT NO:   [de-identified]                           ADMIT DATE: 10/15/2021  PROVIDER:     Vishal Briseno MD    DATE OF PROCEDURE:  10/15/2021    PREOPERATIVE DIAGNOSES:  1. Right septic olecranon bursitis. 2.  Left arm cellulitis. 3.  Left elbow effusion. POSTOPERATIVE DIAGNOSES:  1. Left elbow septic bursitis with draining wound. 2.  Left elbow fusion. PROCEDURES PERFORMED:  1. Left elbow excisional debridement and bursectomy of left elbow  including the skin, subcutaneous tissues, deep fascia, and bursa. 2.  Left elbow joint aspiration revealing 10 mL of a yellow turbid  fluid. 3.  Left elbow excisional debridement. SURGEON:  Vishal Briseno M.D. ANESTHESIA:  1. General.  2.  Local anesthetic by surgeon consisting of approximately 20 mL of  0.25% Marcaine with epinephrine. ASSISTANT:  None. TOTAL TOURNIQUET TIME:  Approximately 28 minutes at 250 mmHg of brachial  tourniquet. ESTIMATED BLOOD LOSS:  Minimal.    SPECIMENS:  1.  Joint aspirate fluid, 10 mL of yellow turbid fluid sent for cell  count, crystal analysis, differential, and cultures. 2.  Draining sinus tract including skin and subcutaneous tissue were  excised and sent for superficial left elbow cultures. 3.  Deep left elbow tissue was sent for culture. FINDINGS:  1. Turbid fluid of left elbow with 10 mL. Given this finding, elbow  debridement was undertaken. The elbow joint was preserved. There was a  yellow turbid fluid within the joint. 2.  Gross signs of infection of the left elbow bursa. COMPLICATIONS:  None. DISPOSITION:  The patient remained stable throughout the procedure.     OPERATIVE INDICATIONS: The patient is a 65-year-old gentleman who  sustained injury to his left elbow, had a draining wound, was admitted  and started on IV antibiotics, found to have sepsis secondary to  cellulitis. Consultation was placed for evaluation of his wound. Given  the patient's medical status and sepsis and a left elbow wound, urgent  surgical intervention was recommended in the form of excisional  debridement of the draining wound as well as possible elbow debridement  as needed. Risks included, but not limited to the risk of continued or  worsening infection, damage to nerves, vessels, or tendons, wound  healing complications, need for additional surgery, unforeseen  complications, elbow stiffness, and elbow arthritis. He understood and  wished to proceed. DESCRIPTION OF PROCEDURE:  The patient was identified in the holding  area. The left elbow was identified as the surgical site. He was then  seen by Anesthesia, taken to the operating room, placed supine on the  table, and underwent general anesthesia per Anesthesia Department. All  bony prominences were well padded. A well-padded arm tourniquet was  placed. The left upper extremity was prepared and draped in the  standard sterile fashion. The posterior wound was then inspected. There was a draining sinus tract directly extending down into the  olecranon bursa. There was gross purulence present. A 15-blade scalpel  was then used to ellipse out the wound and extended proximally and  distally in a Z-type fashion. Flaps were elevated. The draining sinus  tract including the skin, subcutaneous tissues, and deep fascia were  excised and sent for culture. The underlying tissue was then sharply  excised with a 15-blade scalpel down to including triceps fascia and the  olecranon periosteum and sent for deep culture followed by use of a  rongeur for further debridement. Three liters of pulsatile lavage was  then used to copiously irrigate out the wound.   The remaining tissues  appeared to be healthy and viable. An 18-gauge needle was then inserted over the lateral aspect of the  elbow joint over the radial capitellum aspiration revealed approximately  10 mL of a viscous yellow turbid fluid. Given these findings elbow  debridement was undertaken. The fluid was sent for cell count,  differential, crystal analysis, and culture. A direct lateral approach to the elbow was undertaken with a clean  15-blade scalpel. This was carried down to the common extensor origin,  which was split longitudinally down into the joint. The annular  ligament was divided with the forearm in pronation. A thick viscous  yellowish fluid was present within the joint. The radiocapitellar joint  was inspected. The cartilage was intact as well as the ulnohumeral  articulation. Additional 3 liters of fluid was then copiously irrigated  out through the elbow joint. The deep tissue was then closed with 0 PDS  followed by 2-0 PDS and 3-0 nylon suture. Tourniquet was deflated and  electrocautery was used to achieve hemostasis of the olecranon wound  with the elbow in extension. The wound over the olecranon was closed  with multiple nylon sutures. The wound was left open proximally and  distally followed by iodoform packing. Bulky sterile dressing and a  splint was applied. The patient remained stable and was taken to  recovery room.         Philly Castillo MD    D: 10/15/2021 15:28:45       T: 10/15/2021 15:31:49     [id number]  Job#: [id number]     Doc#: [id number]    CC:

## 2021-10-15 NOTE — H&P
[facility] History and Physical    Perpetual assessment: 53-year-old male with past medical history positive for diabetes, hypertension hyperlipidemia presents with left elbow pain. Assessment and plan:    Left elbow cellulitis  -Some concern for possible osteomyelitis  -Does have open wounds  -Imaging does show significant inflammation  -Awaiting CRP  -Check MRI to rule out osteo-  -Continue vancomycin and Zosyn  -Consult orthopedics for further evaluation            Type II NIDDM  -Blood glucose is stable  -We will discontinue patient's Metformin present sliding scale    Hypernatremia  -Suspect secondary to viral depletion  -We will continue gentle hydration 75 mL an hour  -Repeat chemistry in morning    Essential hypertension  -Blood pressures is more on the soft side  -We will hold off antihypertensives for now  -Patient report does not know the names of his medications    Hyperlipidemia  -We will resume statin once verified    CODE STATUS: Full  DVT prophylaxis: Lovenox    CHIEF COMPLAINT: Left elbow pain    History of Present Illness: This is a 53-year-old male who presents to [facility] with above-stated complaint. All history has been taken from the patient and review of medical records. Patient reports over the week he has been having swelling of his left elbow. This is concerned about when he can unable to bend his elbow. He has significant pain associated with this. He said some fevers and chills. Because he decided come to the ED for evaluation. Imaging did show significant subcutaneous swelling. There is also having gas. The emergency room physician was able to pass a Q-tip almost to the bone. There is concern for possible osteomyelitis. CRP is currently pending. We will check an MRI. We will continue IV vancomycin and Zosyn. Will consult orthopedics for further evaluation.     Informant(s) for H&P:    REVIEW OF SYSTEMS:  A comprehensive review of systems was negative except for: what is in the HPI      PMH:  Past Medical History:   Diagnosis Date    Diabetes mellitus ([facility] [address]     Hyperlipidemia     Hypertension        Surgical History:  Past Surgical History:   Procedure Laterality Date    APPENDECTOMY      HERNIA REPAIR  2008 & 2013       Medications Prior to Admission:    Prior to Admission medications    Medication Sig Start Date End Date Taking? Authorizing Provider   metFORMIN (GLUCOPHAGE) 1000 MG tablet Take 1,000 mg by mouth 2 times daily (with meals)   Yes Historical Provider, MD       Allergies:    Patient has no known allergies. Social History:    reports that he quit smoking about 2 years ago. He has never used smokeless tobacco. He reports that he does not drink alcohol and does not use drugs. Family History:   family history is not on file.        PHYSICAL EXAM:  Vitals:  /69   Pulse 97   Temp 97.3 °F (36.3 °C)   Resp 15   Ht 5' 7\" (1.702 m)   Wt 218 lb (98.9 kg)   SpO2 94%   BMI 34.14 kg/m²     General Appearance: alert and oriented to person, place and time and in no acute distress  Skin: See below image  Head: normocephalic and atraumatic  Eyes: pupils equal, round, and reactive to light, extraocular eye movements intact, conjunctivae normal  Neck: neck supple and non tender without mass   Pulmonary/Chest: clear to auscultation bilaterally- no wheezes, rales or rhonchi, normal air movement, no respiratory distress  Cardiovascular: normal rate, normal S1 and S2 and no carotid bruits  Abdomen: soft, non-tender, non-distended, normal bowel sounds, no masses or organomegaly  Extremities: no cyanosis, no clubbing and no edema  Neurologic: no cranial nerve deficit and speech normal            LABS:  Recent Labs     10/15/21  0104   *   K 5.2*   CL 93*   CO2 17*   BUN 11   CREATININE 0.8   GLUCOSE 128*   CALCIUM 9.4       Recent Labs     10/15/21  0234   WBC 13.7*   RBC 4.31   HGB 13.4   HCT 40.7   MCV 94.4   MCH 31.1 MCHC 32.9   RDW 14.1      MPV 9.4       No results for input(s): POCGLU in the last 72 hours. Radiology:   XR HUMERUS LEFT (MIN 2 VIEWS)   Final Result   No acute fracture or malalignment of the left humerus, left elbow, left   forearm, left wrist or left hand. Elbow joint effusion. Soft tissue swelling from distal upper arm to at least the MCP joints, as   described, with marked swelling dorsal to the metacarpals, with likely palmar   swelling as well. Small amount of soft tissue emphysema dorsal to the elbow, compatible with an   element of laceration or puncture wound in the context of trauma history. No generalized/disseminated soft tissue emphysema to suggest gas-forming   infection. XR ELBOW LEFT (MIN 3 VIEWS)   Final Result   No acute fracture or malalignment of the left humerus, left elbow, left   forearm, left wrist or left hand. Elbow joint effusion. Soft tissue swelling from distal upper arm to at least the MCP joints, as   described, with marked swelling dorsal to the metacarpals, with likely palmar   swelling as well. Small amount of soft tissue emphysema dorsal to the elbow, compatible with an   element of laceration or puncture wound in the context of trauma history. No generalized/disseminated soft tissue emphysema to suggest gas-forming   infection. XR RADIUS ULNA LEFT (2 VIEWS)   Final Result   No acute fracture or malalignment of the left humerus, left elbow, left   forearm, left wrist or left hand. Elbow joint effusion. Soft tissue swelling from distal upper arm to at least the MCP joints, as   described, with marked swelling dorsal to the metacarpals, with likely palmar   swelling as well. Small amount of soft tissue emphysema dorsal to the elbow, compatible with an   element of laceration or puncture wound in the context of trauma history.       No generalized/disseminated soft tissue emphysema to suggest gas-forming   infection. XR WRIST LEFT (MIN 3 VIEWS)   Final Result   No acute fracture or malalignment of the left humerus, left elbow, left   forearm, left wrist or left hand. Elbow joint effusion. Soft tissue swelling from distal upper arm to at least the MCP joints, as   described, with marked swelling dorsal to the metacarpals, with likely palmar   swelling as well. Small amount of soft tissue emphysema dorsal to the elbow, compatible with an   element of laceration or puncture wound in the context of trauma history. No generalized/disseminated soft tissue emphysema to suggest gas-forming   infection. XR HAND LEFT (MIN 3 VIEWS)   Final Result   No acute fracture or malalignment of the left humerus, left elbow, left   forearm, left wrist or left hand. Elbow joint effusion. Soft tissue swelling from distal upper arm to at least the MCP joints, as   described, with marked swelling dorsal to the metacarpals, with likely palmar   swelling as well. Small amount of soft tissue emphysema dorsal to the elbow, compatible with an   element of laceration or puncture wound in the context of trauma history. No generalized/disseminated soft tissue emphysema to suggest gas-forming   infection. NOTE: This report was transcribed using voice recognition software. Every effort was made to ensure accuracy; however, inadvertent computerized transcription errors may be present.   Electronically signed by Hayley Navas MD on 10/15/2021 at 4:48 AM

## 2021-10-15 NOTE — ED PROVIDER NOTES
65-year-old male history of diabetes hypertension and high cholesterol presents to the emergency department with a left elbow swelling with redness to the forearm. The patient states he had a fall approximately 1 week ago. He states that he was concerned and there grew to be increasing redness and swelling and so he came in for further evaluation today. He denies fevers chills nausea vomiting diarrhea or other complaints at this time. The history is provided by the patient. Arm Injury  Location:  Elbow  Elbow location:  L elbow  Injury: yes    Mechanism of injury: fall    Fall:     Impact surface:  [place]    Point of impact: left elbow. Pain details:     Quality:  Aching    Radiates to:  Does not radiate    Severity:  Moderate    Onset quality:  Gradual    Duration:  1 week    Timing:  Intermittent    Progression:  Waxing and waning  Handedness:  Right-handed  Tetanus status:  Up to date  Relieved by:  Nothing  Worsened by:  Nothing  Ineffective treatments:  None tried  Associated symptoms: no back pain and no fever         Review of Systems   Constitutional: Negative for chills and fever. HENT: Negative for ear pain, sinus pressure and sore throat. Eyes: Negative for pain, discharge and redness. Respiratory: Negative for cough, shortness of breath and wheezing. Cardiovascular: Negative for chest pain. Gastrointestinal: Negative for abdominal pain, diarrhea, nausea and vomiting. Genitourinary: Negative for dysuria and frequency. Musculoskeletal: Negative for arthralgias and back pain. Skin: Negative for rash and wound. Neurological: Negative for weakness and headaches. Hematological: Negative for adenopathy. All other systems reviewed and are negative. Physical Exam  Constitutional:       Appearance: Normal appearance. He is obese. HENT:      Head: Normocephalic and atraumatic. Nose: Nose normal.   Eyes:      Extraocular Movements: Extraocular movements intact. Health Care Proxy (HCP) allergies. -------------------------------------------------- RESULTS -------------------------------------------------    LABS:  Results for orders placed or performed during the hospital encounter of 80/70/40   Basic Metabolic Panel w/ Reflex to MG   Result Value Ref Range    Sodium 128 (L) 132 - 146 mmol/L    Potassium reflex Magnesium 5.2 (H) 3.5 - 5.0 mmol/L    Chloride 93 (L) 98 - 107 mmol/L    CO2 17 (L) 22 - 29 mmol/L    Anion Gap 18 (H) 7 - 16 mmol/L    Glucose 128 (H) 74 - 99 mg/dL    BUN 11 6 - 20 mg/dL    CREATININE 0.8 0.7 - 1.2 mg/dL    GFR Non-African American >60 >=60 mL/min/1.73    GFR African American >60     Calcium 9.4 8.6 - 10.2 mg/dL   SPECIMEN REJECTION   Result Value Ref Range    Rejected Test cbcwd     Reason for Rejection see below    CBC auto differential   Result Value Ref Range    WBC 13.7 (H) 4.5 - 11.5 E9/L    RBC 4.31 3.80 - 5.80 E12/L    Hemoglobin 13.4 12.5 - 16.5 g/dL    Hematocrit 40.7 37.0 - 54.0 %    MCV 94.4 80.0 - 99.9 fL    MCH 31.1 26.0 - 35.0 pg    MCHC 32.9 32.0 - 34.5 %    RDW 14.1 11.5 - 15.0 fL    Platelets 472 998 - 610 E9/L    MPV 9.4 7.0 - 12.0 fL       RADIOLOGY:  XR HUMERUS LEFT (MIN 2 VIEWS)    Result Date: 10/15/2021  EXAMINATION: THREE XRAY VIEWS OF THE LEFT ELBOW; TWO XRAY VIEWS OF THE LEFT HUMERUS; TWO XRAY VIEWS OF THE LEFT FOREARM; 3 XRAY VIEWS OF THE LEFT WRIST; THREE XRAY VIEWS OF THE LEFT HAND 10/14/2021 11:19 pm COMPARISON: None. HISTORY: ORDERING SYSTEM PROVIDED HISTORY: fall TECHNOLOGIST PROVIDED HISTORY: Reason for exam:->fall FINDINGS: Three views left elbow demonstrate anatomic alignment and no direct evidence of acute fracture. There is mild ventral bowing of the anterior fat pad, compatible with joint effusion. There is soft tissue gas and swelling seen posterior to the elbow, compatible with contusion/laceration. Two views left humerus demonstrate anatomic alignment at the elbow and shoulder joints. The humerus appears intact.  Soft tissue gas and swelling are again noted posterior to the elbow joint. Two views left forearm demonstrate no gross malalignment at the elbow or wrist joint. The radius and ulna appear intact. There is the suggestion of generalized soft tissue swelling of the left forearm, with relative soft tissue prominence dorsally and with greater mottling of subcutaneous tissues medially, with perhaps some involvement of the most distal upper arm medially. Three views left wrist demonstrate anatomic alignment and no acute osseous abnormality. There again appears to be generalized soft tissue swelling involving the distal forearm, as well as at least the dorsal visualized hand. No soft tissue gas or radiopaque foreign body. Three views left hand demonstrate no acute fracture or focal malalignment. Degenerative changes of a mild degree are seen at the 1st MCP and IP joints. There is wilbert soft tissue swelling dorsal to the metacarpals, contiguous with distal forearm swelling. Probable palmar swelling as well. No acute fracture or malalignment of the left humerus, left elbow, left forearm, left wrist or left hand. Elbow joint effusion. Soft tissue swelling from distal upper arm to at least the MCP joints, as described, with marked swelling dorsal to the metacarpals, with likely palmar swelling as well. Small amount of soft tissue emphysema dorsal to the elbow, compatible with an element of laceration or puncture wound in the context of trauma history. No generalized/disseminated soft tissue emphysema to suggest gas-forming infection. XR ELBOW LEFT (MIN 3 VIEWS)    Result Date: 10/15/2021  EXAMINATION: THREE XRAY VIEWS OF THE LEFT ELBOW; TWO XRAY VIEWS OF THE LEFT HUMERUS; TWO XRAY VIEWS OF THE LEFT FOREARM; 3 XRAY VIEWS OF THE LEFT WRIST; THREE XRAY VIEWS OF THE LEFT HAND 10/14/2021 11:19 pm COMPARISON: None.  HISTORY: ORDERING SYSTEM PROVIDED HISTORY: fall TECHNOLOGIST PROVIDED HISTORY: Reason for exam:->fall FINDINGS: Three views left elbow demonstrate anatomic alignment and no direct evidence of acute fracture. There is mild ventral bowing of the anterior fat pad, compatible with joint effusion. There is soft tissue gas and swelling seen posterior to the elbow, compatible with contusion/laceration. Two views left humerus demonstrate anatomic alignment at the elbow and shoulder joints. The humerus appears intact. Soft tissue gas and swelling are again noted posterior to the elbow joint. Two views left forearm demonstrate no gross malalignment at the elbow or wrist joint. The radius and ulna appear intact. There is the suggestion of generalized soft tissue swelling of the left forearm, with relative soft tissue prominence dorsally and with greater mottling of subcutaneous tissues medially, with perhaps some involvement of the most distal upper arm medially. Three views left wrist demonstrate anatomic alignment and no acute osseous abnormality. There again appears to be generalized soft tissue swelling involving the distal forearm, as well as at least the dorsal visualized hand. No soft tissue gas or radiopaque foreign body. Three views left hand demonstrate no acute fracture or focal malalignment. Degenerative changes of a mild degree are seen at the 1st MCP and IP joints. There is wilbert soft tissue swelling dorsal to the metacarpals, contiguous with distal forearm swelling. Probable palmar swelling as well. No acute fracture or malalignment of the left humerus, left elbow, left forearm, left wrist or left hand. Elbow joint effusion. Soft tissue swelling from distal upper arm to at least the MCP joints, as described, with marked swelling dorsal to the metacarpals, with likely palmar swelling as well. Small amount of soft tissue emphysema dorsal to the elbow, compatible with an element of laceration or puncture wound in the context of trauma history. No generalized/disseminated soft tissue emphysema to suggest gas-forming infection.      XR RADIUS ULNA LEFT (2 VIEWS)    Result Date: 10/15/2021  EXAMINATION: THREE XRAY VIEWS OF THE LEFT ELBOW; TWO XRAY VIEWS OF THE LEFT HUMERUS; TWO XRAY VIEWS OF THE LEFT FOREARM; 3 XRAY VIEWS OF THE LEFT WRIST; THREE XRAY VIEWS OF THE LEFT HAND 10/14/2021 11:19 pm COMPARISON: None. HISTORY: ORDERING SYSTEM PROVIDED HISTORY: fall TECHNOLOGIST PROVIDED HISTORY: Reason for exam:->fall FINDINGS: Three views left elbow demonstrate anatomic alignment and no direct evidence of acute fracture. There is mild ventral bowing of the anterior fat pad, compatible with joint effusion. There is soft tissue gas and swelling seen posterior to the elbow, compatible with contusion/laceration. Two views left humerus demonstrate anatomic alignment at the elbow and shoulder joints. The humerus appears intact. Soft tissue gas and swelling are again noted posterior to the elbow joint. Two views left forearm demonstrate no gross malalignment at the elbow or wrist joint. The radius and ulna appear intact. There is the suggestion of generalized soft tissue swelling of the left forearm, with relative soft tissue prominence dorsally and with greater mottling of subcutaneous tissues medially, with perhaps some involvement of the most distal upper arm medially. Three views left wrist demonstrate anatomic alignment and no acute osseous abnormality. There again appears to be generalized soft tissue swelling involving the distal forearm, as well as at least the dorsal visualized hand. No soft tissue gas or radiopaque foreign body. Three views left hand demonstrate no acute fracture or focal malalignment. Degenerative changes of a mild degree are seen at the 1st MCP and IP joints. There is wilbert soft tissue swelling dorsal to the metacarpals, contiguous with distal forearm swelling. Probable palmar swelling as well. No acute fracture or malalignment of the left humerus, left elbow, left forearm, left wrist or left hand. Elbow joint effusion.  Soft tissue swelling from distal upper arm to at least the MCP joints, as described, with marked swelling dorsal to the metacarpals, with likely palmar swelling as well. Small amount of soft tissue emphysema dorsal to the elbow, compatible with an element of laceration or puncture wound in the context of trauma history. No generalized/disseminated soft tissue emphysema to suggest gas-forming infection. XR WRIST LEFT (MIN 3 VIEWS)    Result Date: 10/15/2021  EXAMINATION: THREE XRAY VIEWS OF THE LEFT ELBOW; TWO XRAY VIEWS OF THE LEFT HUMERUS; TWO XRAY VIEWS OF THE LEFT FOREARM; 3 XRAY VIEWS OF THE LEFT WRIST; THREE XRAY VIEWS OF THE LEFT HAND 10/14/2021 11:19 pm COMPARISON: None. HISTORY: ORDERING SYSTEM PROVIDED HISTORY: fall TECHNOLOGIST PROVIDED HISTORY: Reason for exam:->fall FINDINGS: Three views left elbow demonstrate anatomic alignment and no direct evidence of acute fracture. There is mild ventral bowing of the anterior fat pad, compatible with joint effusion. There is soft tissue gas and swelling seen posterior to the elbow, compatible with contusion/laceration. Two views left humerus demonstrate anatomic alignment at the elbow and shoulder joints. The humerus appears intact. Soft tissue gas and swelling are again noted posterior to the elbow joint. Two views left forearm demonstrate no gross malalignment at the elbow or wrist joint. The radius and ulna appear intact. There is the suggestion of generalized soft tissue swelling of the left forearm, with relative soft tissue prominence dorsally and with greater mottling of subcutaneous tissues medially, with perhaps some involvement of the most distal upper arm medially. Three views left wrist demonstrate anatomic alignment and no acute osseous abnormality. There again appears to be generalized soft tissue swelling involving the distal forearm, as well as at least the dorsal visualized hand. No soft tissue gas or radiopaque foreign body.  Three views left hand demonstrate no acute fracture or focal malalignment. Degenerative changes of a mild degree are seen at the 1st MCP and IP joints. There is wilbert soft tissue swelling dorsal to the metacarpals, contiguous with distal forearm swelling. Probable palmar swelling as well. No acute fracture or malalignment of the left humerus, left elbow, left forearm, left wrist or left hand. Elbow joint effusion. Soft tissue swelling from distal upper arm to at least the MCP joints, as described, with marked swelling dorsal to the metacarpals, with likely palmar swelling as well. Small amount of soft tissue emphysema dorsal to the elbow, compatible with an element of laceration or puncture wound in the context of trauma history. No generalized/disseminated soft tissue emphysema to suggest gas-forming infection. XR HAND LEFT (MIN 3 VIEWS)    Result Date: 10/15/2021  EXAMINATION: THREE XRAY VIEWS OF THE LEFT ELBOW; TWO XRAY VIEWS OF THE LEFT HUMERUS; TWO XRAY VIEWS OF THE LEFT FOREARM; 3 XRAY VIEWS OF THE LEFT WRIST; THREE XRAY VIEWS OF THE LEFT HAND 10/14/2021 11:19 pm COMPARISON: None. HISTORY: ORDERING SYSTEM PROVIDED HISTORY: fall TECHNOLOGIST PROVIDED HISTORY: Reason for exam:->fall FINDINGS: Three views left elbow demonstrate anatomic alignment and no direct evidence of acute fracture. There is mild ventral bowing of the anterior fat pad, compatible with joint effusion. There is soft tissue gas and swelling seen posterior to the elbow, compatible with contusion/laceration. Two views left humerus demonstrate anatomic alignment at the elbow and shoulder joints. The humerus appears intact. Soft tissue gas and swelling are again noted posterior to the elbow joint. Two views left forearm demonstrate no gross malalignment at the elbow or wrist joint. The radius and ulna appear intact.  There is the suggestion of generalized soft tissue swelling of the left forearm, with relative soft tissue prominence dorsally and with greater mottling of subcutaneous tissues medially, with perhaps some involvement of the most distal upper arm medially. Three views left wrist demonstrate anatomic alignment and no acute osseous abnormality. There again appears to be generalized soft tissue swelling involving the distal forearm, as well as at least the dorsal visualized hand. No soft tissue gas or radiopaque foreign body. Three views left hand demonstrate no acute fracture or focal malalignment. Degenerative changes of a mild degree are seen at the 1st MCP and IP joints. There is wilbert soft tissue swelling dorsal to the metacarpals, contiguous with distal forearm swelling. Probable palmar swelling as well. No acute fracture or malalignment of the left humerus, left elbow, left forearm, left wrist or left hand. Elbow joint effusion. Soft tissue swelling from distal upper arm to at least the MCP joints, as described, with marked swelling dorsal to the metacarpals, with likely palmar swelling as well. Small amount of soft tissue emphysema dorsal to the elbow, compatible with an element of laceration or puncture wound in the context of trauma history. No generalized/disseminated soft tissue emphysema to suggest gas-forming infection.       ------------------------- NURSING NOTES AND VITALS REVIEWED ---------------------------  Date / Time Roomed:  10/15/2021  2:14 AM  ED Bed Assignment:  10/10    The nursing notes within the ED encounter and vital signs as below have been reviewed.      Patient Vitals for the past 24 hrs:   BP Temp Pulse Resp SpO2 Height Weight   10/14/21 2232 109/71 97.3 °F (36.3 °C) 116 16 96 % 5' 7\" (1.702 m) 218 lb (98.9 kg)       Oxygen Saturation Interpretation: Normal    ------------------------------------------ PROGRESS NOTES ------------------------------------------  Counseling:  I have spoken with the patient and discussed todays results, in addition to providing specific details for the plan of care and counseling regarding the diagnosis and prognosis. Their questions are answered at this time and they are agreeable with the plan of admission.    --------------------------------- ADDITIONAL PROVIDER NOTES ---------------------------------  This patient's ED course included: a personal history and physicial examination, re-evaluation prior to disposition, multiple bedside re-evaluations, IV medications, cardiac monitoring and continuous pulse oximetry    This patient has remained hemodynamically stable during their ED course. Diagnosis:  1. Injury of left elbow, initial encounter    2. Left arm cellulitis    3. Sepsis due to cellulitis [facility])        Disposition:  Patient's disposition: Admit to Med Surg  Patient's condition is fair.          Narinder Virgen DO  10/15/21 [id number]

## 2021-10-15 NOTE — ANESTHESIA PRE PROCEDURE
Department of Anesthesiology  Preprocedure Note       Name:  Adryan Corrigan   Age:  63 y.o.  :  1960                                          MRN:  [id number]         Date:  10/15/2021      Surgeon: Lonna Frankel):  Sabrina Abel MD    Procedure: Procedure(s):  LEFT ELBOW INCISION AND DRAINAGE    Medications prior to admission:   Prior to Admission medications    Medication Sig Start Date End Date Taking?  Authorizing Provider   metFORMIN (GLUCOPHAGE) 1000 MG tablet Take 1,000 mg by mouth 2 times daily (with meals)   Yes Historical Provider, MD       Current medications:    Current Facility-Administered Medications   Medication Dose Route Frequency Provider Last Rate Last Admin    glucose (GLUTOSE) 40 % oral gel 15 g  15 g Oral PRN Virgil Jett MD        dextrose 50 % IV solution  12.5 g IntraVENous PRN Virgil Jett MD        glucagon (rDNA) injection 1 mg  1 mg IntraMUSCular PRN Virgil Jett MD        dextrose 5 % solution  100 mL/hr IntraVENous PRN Virgil Jett MD        sodium chloride flush 0.9 % injection 5-40 mL  5-40 mL IntraVENous 2 times per day Virgil Jett MD   10 mL at 10/15/21 0836    sodium chloride flush 0.9 % injection 5-40 mL  5-40 mL IntraVENous PRN Virgil Jett MD   10 mL at 10/15/21 0835    0.9 % sodium chloride infusion  25 mL IntraVENous PRN Virgil Jett  mL/hr at 10/15/21 0834 25 mL at 10/15/21 0834    enoxaparin (LOVENOX) injection 40 mg  40 mg SubCUTAneous Daily Virgil Jett MD   40 mg at 10/15/21 0839    ondansetron (ZOFRAN-ODT) disintegrating tablet 4 mg  4 mg Oral Q8H PRN Virgil Jett MD        Or    ondansetron [facility]) injection 4 mg  4 mg IntraVENous Q6H PRN Virgil Jett MD        polyethylene glycol (GLYCOLAX) packet 17 g  17 g Oral Daily PRN Virgil Jett MD        acetaminophen (TYLENOL) tablet 650 mg  650 mg Oral Q6H PRN Virgil Jett MD        Or    acetaminophen (TYLENOL) suppository 650 mg  650 mg Rectal Q6H PRN Dorina Nicole Lee MD        0.9 % sodium chloride infusion   IntraVENous Continuous Carlos Kahn MD 75 mL/hr at 10/15/21 0828 New Bag at 10/15/21 0828    insulin lispro (HUMALOG) injection vial 0-6 Units  0-6 Units SubCUTAneous TID WC Carlos Kahn MD        insulin lispro (HUMALOG) injection vial 0-3 Units  0-3 Units SubCUTAneous Nightly Carlos Kahn MD        albuterol (PROVENTIL) nebulizer solution 2.5 mg  2.5 mg Nebulization Q4H PRN Carlos Kahn MD        piperacillin-tazobactam (ZOSYN) 3,375 mg in dextrose 5 % 50 mL IVPB (mini-bag)  3,375 mg IntraVENous Q8H Carlos Kahn MD   Stopped at 10/15/21 1005    influenza quadrivalent split vaccine (FLUZONE;FLUARIX;FLULAVAL;AFLURIA) injection 0.5 mL  0.5 mL IntraMUSCular Prior to discharge Carlos Kahn MD        vancomycin (VANCOCIN) 1,250 mg in dextrose 5 % 250 mL IVPB  1,250 mg IntraVENous Q12H Carlos Kahn MD        0.9 % sodium chloride infusion   IntraVENous Continuous Catalino Rosas  mL/hr at 10/15/21 1005 New Bag at 10/15/21 1005    sodium chloride flush 0.9 % injection 5-40 mL  5-40 mL IntraVENous 2 times per day Catalino Rosas MD        sodium chloride flush 0.9 % injection 5-40 mL  5-40 mL IntraVENous PRN Catalino Rosas MD        0.9 % sodium chloride infusion  25 mL IntraVENous PRN Catalino Rosas MD           Allergies:  No Known Allergies    Problem List:    Patient Active Problem List   Diagnosis Code    COPD (chronic obstructive pulmonary disease) ([facility] J44.9    Precordial pain R07.2    Mixed hyperlipidemia E78.2    PSVT (paroxysmal supraventricular tachycardia) ([facility] I47.1    Hypertension I10    Hyperlipidemia E78.5    Diabetes mellitus ([facility] E11.9    Left arm cellulitis L03.114       Past Medical History:        Diagnosis Date    Diabetes mellitus ([facility]     Hyperlipidemia     Hypertension        Past Surgical History:        Procedure Laterality Date    APPENDECTOMY      HERNIA REPAIR  2008 & 2013 Social History:    Social History     Tobacco Use    Smoking status: Former Smoker     Quit date: 2019     Years since quittin.7    Smokeless tobacco: Never Used   Substance Use Topics    Alcohol use: Never                                Counseling given: Not Answered      Vital Signs (Current):   Vitals:    10/14/21 2232 10/15/21 0337 10/15/21 0647 10/15/21 0815   BP: 109/71 105/69 125/82 124/79   Pulse: 116 97 110 112   Resp: 16 15 14 18   Temp: 36.3 °C (97.3 °F)  36.7 °C (98 °F) 36.7 °C (98 °F)   TempSrc:   Oral Oral   SpO2: 96% 94% 95% 96%   Weight: 218 lb (98.9 kg)   213 lb 2 oz (96.7 kg)   Height: 5' 7\" (1.702 m)   5' 7\" (1.702 m)                                              BP Readings from Last 3 Encounters:   10/15/21 124/79   19 118/70       NPO Status:                                                                                 BMI:   Wt Readings from Last 3 Encounters:   10/15/21 213 lb 2 oz (96.7 kg)   19 220 lb (99.8 kg)     Body mass index is 33.38 kg/m². CBC:   Lab Results   Component Value Date    WBC 13.7 10/15/2021    RBC 4.31 10/15/2021    HGB 13.4 10/15/2021    HCT 40.7 10/15/2021    MCV 94.4 10/15/2021    RDW 14.1 10/15/2021     10/15/2021       CMP:   Lab Results   Component Value Date     10/15/2021    K 5.2 10/15/2021    CL 93 10/15/2021    CO2 17 10/15/2021    BUN 11 10/15/2021    CREATININE 0.8 10/15/2021    GFRAA >60 10/15/2021    LABGLOM >60 10/15/2021    GLUCOSE 128 10/15/2021    CALCIUM 9.4 10/15/2021       POC Tests: No results for input(s): POCGLU, POCNA, POCK, POCCL, POCBUN, POCHEMO, POCHCT in the last 72 hours.     Coags: No results found for: PROTIME, INR, APTT    HCG (If Applicable): No results found for: PREGTESTUR, PREGSERUM, HCG, HCGQUANT     ABGs: No results found for: PHART, PO2ART, RNL4BMK, WLR3QUT, BEART, W5PNZSJR     Type & Screen (If Applicable):  No results found for: LABABO, LABRH    Drug/Infectious Status (If Applicable):  No results found for: HIV, HEPCAB    COVID-19 Screening (If Applicable): No results found for: COVID19    EKG 04/29/2019    Sinus  Rhythm   -Left axis. ABNORMAL   Anesthesia Evaluation  Patient summary reviewed and Nursing notes reviewed no history of anesthetic complications:   Airway: Mallampati: IV  TM distance: >3 FB   Neck ROM: full  Mouth opening: > = 3 FB Dental:          Pulmonary:   (+) COPD (Needs rescue inhaler every couple weeks, atrovent daily):  decreased breath sounds,  current smoker (Smokes 1 ppd, last cigarette yesterday)          Patient did not smoke on day of surgery. ROS comment: Former smoker (quit in 2019)   Cardiovascular:  Exercise tolerance: good (>4 METS),   (+) hypertension:, orthopnea, hyperlipidemia      ECG reviewed  Rhythm: regular  Rate: normal           Beta Blocker:  Not on Beta Blocker (Patient hasn't taken meds since yesterday morning, admission)      ROS comment: BP meds have been held in hospital      Neuro/Psych:   (+) psychiatric history (ETOH abuse daily 12 beers per day):depression/anxiety             GI/Hepatic/Renal:   (+) GERD:, morbid obesity          Endo/Other:    (+) Diabetes (sliding scale, metformin)Type II DM, , : arthritis: OA., electrolyte abnormalities, . ROS comment: Left arm cellulitis  Abdominal:   (+) obese,     Abdomen: soft. Vascular: negative vascular ROS. Other Findings:           Anesthesia Plan      general     ASA 3 - emergent     (Ate at 9am; surgeon says urgent case because of infection. Will proceed with RSI.)  Induction: intravenous. Anesthetic plan and risks discussed with patient. Use of blood products discussed with patient whom.                  Jennifer Valero RN   10/15/2021

## 2021-10-15 NOTE — ED NOTES
[facility] faxed and Negra Cross verifies she received it on [place] [id number], patient chimed for transport upstairs.      Fernanda Berg RN  10/15/21 4940

## 2021-10-15 NOTE — PROGRESS NOTES
Pharmacy Consultation Note  (Antibiotic Dosing and Monitoring)    Initial consult date: 10/15/21  Consulting physician/provider: Dr. Su Bird  Drug: Vancomycin  Indication: SSTI    Age/  Gender Height Weight IBW  Allergy Information   61 y.o./male 5' 7\" (170.2 cm) 218 lb (98.9 kg)     Ideal body weight: 66.1 kg (145 lb 11.6 oz)  Adjusted ideal body weight: 78.3 kg (172 lb 11 oz)   Patient has no known allergies. Renal Function:  Recent Labs     10/15/21  0104   BUN 11   CREATININE 0.8     No intake or output data in the 24 hours ending 10/15/21 0908    Vancomycin Monitoring:  Trough:  No results for input(s): VANCOTROUGH in the last 72 hours. Random:  No results for input(s): VANCORANDOM in the last 72 hours. Vancomycin Administration Times:  Recent vancomycin administrations                   vancomycin 2000 mg in dextrose 5% 500 ml IVPB (mg) 2,000 mg New Bag 10/15/21 2801                Assessment:  · Patient is a 63 y.o. male who has been initiated on vancomycin  · Estimated Creatinine Clearance: 110 mL/min (based on SCr of 0.8 mg/dL). · To dose vancomycin, pharmacy will be utilizing [employer] calculation software for goal AUC/KAITLIN 400-600 mg/L-hr   · Received vancomycin 2000mg x 1 on admission    Plan:  · Will continue vancomycin 1250 IV every 12 hours  · Will check vancomycin levels when appropriate  · Will continue to monitor renal function   · Clinical pharmacy to follow      Tiff Mejia PharmD, BCPS 10/15/2021 9:08 AM     Vancomycin was briefly discontinued but pharmacy was re-consulted by ID.  Will reorder Vancomycin 1250 mg IV q12h  Dena Gustafson [facility], [facility] 10/15/2021 5:29 PM

## 2021-10-15 NOTE — BRIEF OP NOTE
Brief Postoperative Note      Patient: Adryan Corrigan  YOB: 1960  MRN: [id number]    Date of Procedure: 10/15/2021    Pre-Op Diagnosis: /    Post-Op Diagnosis: Same       Procedure(s):  LEFT ELBOW INCISION AND DRAINAGE    Surgeon(s):  Sabrina Abel MD    Assistant:  Surgical Assistant: Dora Reyes  Physician Assistant: AQUILINO Richardson    Anesthesia: Monitor Anesthesia Care    Estimated Blood Loss (mL): Minimal    Complications: None    Specimens:   ID Type Source Tests Collected by Time Destination   1 :   left elbow fluid aspiration Tissue Tissue BODY FLUID CELL COUNT WITH DIFFERENTIAL, CULTURE, SURGICAL, BODY FLUID CRYSTAL Sabrina Abel MD 10/15/2021 1418    2 : LEFT ELBOW WOUND SUPERFICIAL TISSUE Tissue Tissue CULTURE, FUNGUS, GRAM STAIN, CULTURE, SURGICAL, CULTURE WITH SMEAR, ACID FAST [address] [place] [address] Mary Kang MD 10/15/2021 1422    3 : LEFT ELBOW DEEP TISSUE Tissue Tissue CULTURE, FUNGUS, GRAM STAIN, CULTURE, SURGICAL, CULTURE WITH SMEAR, ACID FAST Sundar Merritt MD 10/15/2021 1427        Implants:  * No implants in log *      Drains: * No LDAs found *    Findings: see op note    Electronically signed by Sabrina Abel MD on 10/15/2021 at 3:11 PM

## 2021-10-15 NOTE — CONSULTS
Department of Orthopedic Surgery  [facility] Guevara Chun MD  Consult      Reason for Consult:  Left elbow infection    Consulting Physician: Dr Damaris Carvajal:                The patient is a 63 y.o. male who presents with 1 week history of worsening left elbow drainage redness and swelling. Patient was recently admitted early this morning with continued redness swelling and drainage of his left elbow. Patient is right-hand dominant. Reports 1 week ago he fell onto his left elbow sustained a laceration. He had drainage from the laceration and progressive swelling pain and recent fevers and chills that brought him to the emergency department. He was admitted yesterday and started on IV antibiotic therapy. He has received Maxipime and vancomycin. He reports little improvement in his left elbow. He is an insulin-dependent diabetic. Cyndy Remedies     Past Medical History:        Diagnosis Date    Diabetes mellitus ([address]     Hyperlipidemia     Hypertension      Past Surgical History:        Procedure Laterality Date    APPENDECTOMY      HERNIA REPAIR  2008 & 2013     Current Medications:   Current Facility-Administered Medications: glucose (GLUTOSE) 40 % oral gel 15 g, 15 g, Oral, PRN  dextrose 50 % IV solution, 12.5 g, IntraVENous, PRN  glucagon (rDNA) injection 1 mg, 1 mg, IntraMUSCular, PRN  dextrose 5 % solution, 100 mL/hr, IntraVENous, PRN  sodium chloride flush 0.9 % injection 5-40 mL, 5-40 mL, IntraVENous, 2 times per day  sodium chloride flush 0.9 % injection 5-40 mL, 5-40 mL, IntraVENous, PRN  0.9 % sodium chloride infusion, 25 mL, IntraVENous, PRN  enoxaparin (LOVENOX) injection 40 mg, 40 mg, SubCUTAneous, Daily  ondansetron (ZOFRAN-ODT) disintegrating tablet 4 mg, 4 mg, Oral, Q8H PRN **OR** ondansetron (ZOFRAN) injection 4 mg, 4 mg, IntraVENous, Q6H PRN  polyethylene glycol (GLYCOLAX) packet 17 g, 17 g, Oral, Daily PRN  acetaminophen (TYLENOL) tablet 650 mg, 650 mg, Oral, Q6H PRN **OR** acetaminophen (TYLENOL) suppository 650 mg, 650 mg, Rectal, Q6H PRN  0.9 % sodium chloride infusion, , IntraVENous, Continuous  insulin lispro (HUMALOG) injection vial 0-6 Units, 0-6 Units, SubCUTAneous, TID WC  insulin lispro (HUMALOG) injection vial 0-3 Units, 0-3 Units, SubCUTAneous, Nightly  albuterol (PROVENTIL) nebulizer solution 2.5 mg, 2.5 mg, Nebulization, Q4H PRN  piperacillin-tazobactam (ZOSYN) 3,375 mg in dextrose 5 % 50 mL IVPB (mini-bag), 3,375 mg, IntraVENous, Q8H  influenza quadrivalent split vaccine (FLUZONE;FLUARIX;FLULAVAL;AFLURIA) injection 0.5 mL, 0.5 mL, IntraMUSCular, Prior to discharge  vancomycin (VANCOCIN) 1,250 mg in dextrose 5 % 250 mL IVPB, 1,250 mg, IntraVENous, Q12H  0.9 % sodium chloride infusion, , IntraVENous, Continuous  sodium chloride flush 0.9 % injection 5-40 mL, 5-40 mL, IntraVENous, 2 times per day  sodium chloride flush 0.9 % injection 5-40 mL, 5-40 mL, IntraVENous, PRN  0.9 % sodium chloride infusion, 25 mL, IntraVENous, PRN  Allergies:  Patient has no known allergies. Social History:   TOBACCO:   reports that he quit smoking about 2 years ago. He has never used smokeless tobacco.  ETOH:   reports no history of alcohol use. DRUGS:   reports no history of drug use. ACTIVITIES OF DAILY LIVING:    OCCUPATION:    Family History: Denies any family history of inflammatory arthritis or musculoskeletal complaints.   No other pertinent family history    REVIEW OF SYSTEMS:  CONSTITUTIONAL:  negative  EYES:  negative  HEENT:  negative  RESPIRATORY:  negative  CARDIOVASCULAR: Hypertension  GASTROINTESTINAL:  negative  GENITOURINARY:  negative  INTEGUMENT/BREAST:  negative  HEMATOLOGIC/LYMPHATIC:  negative  ALLERGIC/IMMUNOLOGIC:  negative  ENDOCRINE: Diabetes  MUSCULOSKELETAL: Left elbow pain and swelling and drainage  NEUROLOGICAL:  negative  BEHAVIOR/PSYCH:  negative    PHYSICAL EXAM:    VITALS:  /79   Pulse 112   Temp 98 °F (36.7 °C) (Oral)   Resp 18   Ht 5' 7\" (1.702 m)   Wt 213 lb 2 oz (96.7 kg)   SpO2 96%   BMI 33.38 kg/m²   CONSTITUTIONAL:  awake, alert, cooperative, no apparent distress, and appears stated age  EYES:  Lids and lashes normal, pupils equal, round and reactive to light, extra ocular muscles intact, sclera clear, conjunctiva normal  ENT:  Normocephalic, without obvious abnormality, atraumatic, sinuses nontender on palpation, external ears without lesions, oral pharynx with moist mucus membranes, tonsils without erythema or exudates, gums normal and good dentition. NECK:  Supple, symmetrical, trachea midline, no adenopathy, thyroid symmetric, not enlarged and no tenderness, skin normal  LUNGS:  CTA  CARDIOVASCULAR:  RRR  ABDOMEN:  obese  CHEST/BREASTS:  atrauamtic  GENITAL/URINARY:  deferred  NEUROLOGIC:  Awake, alert, oriented to name, place and time. Cranial nerves II-XII are grossly intact. Motor is 5 out of 5 bilaterally. Sensory is intact.   gait is normal.  MUSCULOSKELETAL:    Left upper extremity: Positive warmth and erythema extending up to the axilla from the elbow and distally to the level of the wrist.  There is a posterior elbow wound directly over the olecranon bursa with purulent drainage present. Patient holds the elbow in slight flexion. Limited elbow flexion extension secondary pain dorsally. He does have pain over the lateral elbow joint. No significant pain with elbow pronation and supination. However he does have pain with elbow flexion extension. Distally radial, ulnar, median nerve sensation grossly intact. 5/5 motor function grossly. 2+ radial pulse. He does have swelling of his left elbow extending down into the hand but compartments are soft and compressible with full range of motion of the digits.     DATA:    CBC:   Lab Results   Component Value Date    WBC 13.7 10/15/2021    RBC 4.31 10/15/2021    HGB 13.4 10/15/2021    HCT 40.7 10/15/2021    MCV 94.4 10/15/2021    MCH 31.1 10/15/2021    MCHC 32.9 10/15/2021 RDW 14.1 10/15/2021     10/15/2021    MPV 9.4 10/15/2021     PT/INR:  No results found for: PROTIME, INR    Radiology Review: X-rays of his left hand, wrist, radius and ulna, and elbow as well as femurs dated 10/14/2021 were reviewed. AP lateral as well as oblique images of the upper extremity reviewed with negative fractures or dislocations. Diffuse loss of soft tissue swelling present. Elbow joint effusion is present. .  Negative for any acute fracture dislocations    IMPRESSION:  · Left septic olecranon bursitis with possible septic elbow arthritis  · Insulin-dependent diabetes  · Hypertension    PLAN:  Today's findings were explained to the patient. I have advised patient does have evidence consistent with septic olecranon bursitis. He is insulin-dependent diabetic. This does increase his risk of continued or worsening infection. In addition he does have findings on exam as well as x-rays suggestive of possible concurrent septic elbow arthritis. Given these findings are recommended patient proceed with urgent surgical intervention for debridement of his left arm bursitis with aspiration of the elbow joint and possible debridement of his elbow joint as needed. Postoperative expectations including secondary healing, poss need for additional surgery, wound healing complications, stiffness, postinfectious arthritis as well as unction complications were explained. Severity of the injury is explained to the patient. Recommend ID consultation. All questions answered. I explained the risks, benefits, alternatives and complications of surgery with the patient including but not limited to the risks of continued or worsening infection, possible damage to nerves, vessels, or tendons, stiffness, loss of range of motion, scar sensitivity, wound healing complications, worsening symptoms, possible need for therapy, as well as the possible need further surgery and unanticipated complications.   The patient voiced understanding and all questions were answered. The patient elected to proceed with surgical intervention.      Jose L Trivedi MD  10/15/2021

## 2021-10-15 NOTE — CARE COORDINATION
Social Work discharge planning   Sw met with pt who lives with his brother and sister in law. His bed is on 2nd floor and bath on 1st. NO dme to walk pta. Sw discussed potential need for hhc and choices. Pt had no preference. Referral called to [facility] with Charis Hamilton to check insurance for potential need. Pt said his PCP is in [place], MAYELA Michaels. Pt said he is going to surgery later today. Social Work to follow for support and discharge planning with CM.   Electronically signed by Adele Jones on 10/15/2021 at 10:14 AM

## 2021-10-15 NOTE — PROGRESS NOTES
Message sent to Dr. Hai Mojica regarding home medications verified.   Electronically signed by Scotty Gauthier RN on 10/15/2021 at 11:41 AM

## 2021-10-16 LAB
ANION GAP SERPL CALCULATED.3IONS-SCNC: 10 MMOL/L (ref 7–16)
BUN BLDV-MCNC: 12 MG/DL (ref 6–20)
CALCIUM SERPL-MCNC: 8.9 MG/DL (ref 8.6–10.2)
CHLORIDE BLD-SCNC: 100 MMOL/L (ref 98–107)
CO2: 21 MMOL/L (ref 22–29)
CREAT SERPL-MCNC: 0.8 MG/DL (ref 0.7–1.2)
GFR AFRICAN AMERICAN: >60
GFR NON-AFRICAN AMERICAN: >60 ML/MIN/1.73
GLUCOSE BLD-MCNC: 194 MG/DL (ref 74–99)
GRAM STAIN ORDERABLE: NORMAL
GRAM STAIN ORDERABLE: NORMAL
HCT VFR BLD CALC: 38.4 % (ref 37–54)
HEMOGLOBIN: 12.4 G/DL (ref 12.5–16.5)
MCH RBC QN AUTO: 31.2 PG (ref 26–35)
MCHC RBC AUTO-ENTMCNC: 32.3 % (ref 32–34.5)
MCV RBC AUTO: 96.5 FL (ref 80–99.9)
METER GLUCOSE: 125 MG/DL (ref 74–99)
METER GLUCOSE: 132 MG/DL (ref 74–99)
METER GLUCOSE: 181 MG/DL (ref 74–99)
METER GLUCOSE: 91 MG/DL (ref 74–99)
PDW BLD-RTO: 14 FL (ref 11.5–15)
PLATELET # BLD: 256 E9/L (ref 130–450)
PMV BLD AUTO: 9.8 FL (ref 7–12)
POTASSIUM REFLEX MAGNESIUM: 4.8 MMOL/L (ref 3.5–5)
RBC # BLD: 3.98 E12/L (ref 3.8–5.8)
SODIUM BLD-SCNC: 131 MMOL/L (ref 132–146)
WBC # BLD: 12.3 E9/L (ref 4.5–11.5)

## 2021-10-16 PROCEDURE — 82962 GLUCOSE BLOOD TEST: CPT

## 2021-10-16 PROCEDURE — 6370000000 HC RX 637 (ALT 250 FOR IP): Performed by: INTERNAL MEDICINE

## 2021-10-16 PROCEDURE — 85027 COMPLETE CBC AUTOMATED: CPT

## 2021-10-16 PROCEDURE — 1200000000 HC SEMI PRIVATE

## 2021-10-16 PROCEDURE — 2580000003 HC RX 258: Performed by: PHYSICIAN ASSISTANT

## 2021-10-16 PROCEDURE — 99232 SBSQ HOSP IP/OBS MODERATE 35: CPT | Performed by: INTERNAL MEDICINE

## 2021-10-16 PROCEDURE — 36415 COLL VENOUS BLD VENIPUNCTURE: CPT

## 2021-10-16 PROCEDURE — 6360000002 HC RX W HCPCS: Performed by: PHYSICIAN ASSISTANT

## 2021-10-16 PROCEDURE — 2580000003 HC RX 258: Performed by: NURSE PRACTITIONER

## 2021-10-16 PROCEDURE — 6370000000 HC RX 637 (ALT 250 FOR IP): Performed by: PHYSICIAN ASSISTANT

## 2021-10-16 PROCEDURE — 2580000003 HC RX 258: Performed by: SPECIALIST

## 2021-10-16 PROCEDURE — 80048 BASIC METABOLIC PNL TOTAL CA: CPT

## 2021-10-16 PROCEDURE — 6360000002 HC RX W HCPCS: Performed by: SPECIALIST

## 2021-10-16 PROCEDURE — 99024 POSTOP FOLLOW-UP VISIT: CPT | Performed by: ORTHOPAEDIC SURGERY

## 2021-10-16 RX ORDER — LIDOCAINE HYDROCHLORIDE 10 MG/ML
5 INJECTION, SOLUTION EPIDURAL; INFILTRATION; INTRACAUDAL; PERINEURAL ONCE
Status: COMPLETED | OUTPATIENT
Start: 2021-10-16 | End: 2021-10-17

## 2021-10-16 RX ORDER — HEPARIN SODIUM (PORCINE) LOCK FLUSH IV SOLN 100 UNIT/ML 100 UNIT/ML
3 SOLUTION INTRAVENOUS EVERY 12 HOURS SCHEDULED
Status: DISCONTINUED | OUTPATIENT
Start: 2021-10-16 | End: 2021-10-19 | Stop reason: HOSPADM

## 2021-10-16 RX ORDER — HEPARIN SODIUM (PORCINE) LOCK FLUSH IV SOLN 100 UNIT/ML 100 UNIT/ML
3 SOLUTION INTRAVENOUS PRN
Status: DISCONTINUED | OUTPATIENT
Start: 2021-10-16 | End: 2021-10-19 | Stop reason: HOSPADM

## 2021-10-16 RX ORDER — SODIUM CHLORIDE 9 MG/ML
25 INJECTION, SOLUTION INTRAVENOUS PRN
Status: DISCONTINUED | OUTPATIENT
Start: 2021-10-16 | End: 2021-10-19 | Stop reason: HOSPADM

## 2021-10-16 RX ORDER — SODIUM CHLORIDE 0.9 % (FLUSH) 0.9 %
5-40 SYRINGE (ML) INJECTION EVERY 12 HOURS SCHEDULED
Status: DISCONTINUED | OUTPATIENT
Start: 2021-10-16 | End: 2021-10-19 | Stop reason: HOSPADM

## 2021-10-16 RX ORDER — SODIUM CHLORIDE 0.9 % (FLUSH) 0.9 %
5-40 SYRINGE (ML) INJECTION PRN
Status: DISCONTINUED | OUTPATIENT
Start: 2021-10-16 | End: 2021-10-19 | Stop reason: HOSPADM

## 2021-10-16 RX ADMIN — Medication 10 ML: at 22:04

## 2021-10-16 RX ADMIN — CEFEPIME HYDROCHLORIDE 2000 MG: 2 INJECTION, POWDER, FOR SOLUTION INTRAVENOUS at 10:44

## 2021-10-16 RX ADMIN — METFORMIN HYDROCHLORIDE 1000 MG: 1000 TABLET ORAL at 16:43

## 2021-10-16 RX ADMIN — SERTRALINE 100 MG: 100 TABLET, FILM COATED ORAL at 19:43

## 2021-10-16 RX ADMIN — METFORMIN HYDROCHLORIDE 1000 MG: 1000 TABLET ORAL at 09:01

## 2021-10-16 RX ADMIN — GLIPIZIDE 10 MG: 5 TABLET ORAL at 11:23

## 2021-10-16 RX ADMIN — INSULIN LISPRO 1 UNITS: 100 INJECTION, SOLUTION INTRAVENOUS; SUBCUTANEOUS at 19:44

## 2021-10-16 RX ADMIN — OXYCODONE AND ACETAMINOPHEN 1 TABLET: 5; 325 TABLET ORAL at 15:37

## 2021-10-16 RX ADMIN — Medication 2000 MG: at 23:10

## 2021-10-16 RX ADMIN — Medication 10 ML: at 09:00

## 2021-10-16 RX ADMIN — PANTOPRAZOLE SODIUM 40 MG: 40 TABLET, DELAYED RELEASE ORAL at 06:42

## 2021-10-16 RX ADMIN — Medication 10 ML: at 15:26

## 2021-10-16 RX ADMIN — OXYCODONE AND ACETAMINOPHEN 1 TABLET: 5; 325 TABLET ORAL at 02:44

## 2021-10-16 RX ADMIN — METOPROLOL TARTRATE 25 MG: 25 TABLET, FILM COATED ORAL at 19:43

## 2021-10-16 RX ADMIN — LISINOPRIL 5 MG: 5 TABLET ORAL at 09:00

## 2021-10-16 RX ADMIN — VANCOMYCIN HYDROCHLORIDE 1250 MG: 10 INJECTION, POWDER, LYOPHILIZED, FOR SOLUTION INTRAVENOUS at 18:16

## 2021-10-16 RX ADMIN — OXYCODONE AND ACETAMINOPHEN 2 TABLET: 5; 325 TABLET ORAL at 19:43

## 2021-10-16 RX ADMIN — SODIUM CHLORIDE: 9 INJECTION, SOLUTION INTRAVENOUS at 18:19

## 2021-10-16 RX ADMIN — METOPROLOL TARTRATE 25 MG: 25 TABLET, FILM COATED ORAL at 09:00

## 2021-10-16 RX ADMIN — CEFEPIME HYDROCHLORIDE 2000 MG: 2 INJECTION, POWDER, FOR SOLUTION INTRAVENOUS at 02:42

## 2021-10-16 RX ADMIN — SODIUM CHLORIDE, PRESERVATIVE FREE 10 ML: 5 INJECTION INTRAVENOUS at 18:16

## 2021-10-16 RX ADMIN — OXYCODONE AND ACETAMINOPHEN 1 TABLET: 5; 325 TABLET ORAL at 10:52

## 2021-10-16 RX ADMIN — ENOXAPARIN SODIUM 40 MG: 40 INJECTION SUBCUTANEOUS at 09:00

## 2021-10-16 RX ADMIN — GLIPIZIDE 10 MG: 5 TABLET ORAL at 16:43

## 2021-10-16 RX ADMIN — GLIPIZIDE 10 MG: 5 TABLET ORAL at 06:42

## 2021-10-16 RX ADMIN — SODIUM CHLORIDE, PRESERVATIVE FREE 10 ML: 5 INJECTION INTRAVENOUS at 23:10

## 2021-10-16 RX ADMIN — Medication 2000 MG: at 15:25

## 2021-10-16 RX ADMIN — VANCOMYCIN HYDROCHLORIDE 1250 MG: 10 INJECTION, POWDER, LYOPHILIZED, FOR SOLUTION INTRAVENOUS at 06:41

## 2021-10-16 RX ADMIN — MONTELUKAST SODIUM 10 MG: 10 TABLET, FILM COATED ORAL at 19:43

## 2021-10-16 ASSESSMENT — PAIN DESCRIPTION - ORIENTATION
ORIENTATION: LEFT
ORIENTATION: LEFT

## 2021-10-16 ASSESSMENT — PAIN DESCRIPTION - PAIN TYPE
TYPE: ACUTE PAIN;SURGICAL PAIN
TYPE: ACUTE PAIN;SURGICAL PAIN

## 2021-10-16 ASSESSMENT — PAIN SCALES - GENERAL
PAINLEVEL_OUTOF10: 8
PAINLEVEL_OUTOF10: 7
PAINLEVEL_OUTOF10: 0
PAINLEVEL_OUTOF10: 3
PAINLEVEL_OUTOF10: 8
PAINLEVEL_OUTOF10: 5
PAINLEVEL_OUTOF10: 3
PAINLEVEL_OUTOF10: 4
PAINLEVEL_OUTOF10: 7

## 2021-10-16 ASSESSMENT — PAIN DESCRIPTION - LOCATION
LOCATION: ELBOW
LOCATION: ELBOW

## 2021-10-16 ASSESSMENT — PAIN - FUNCTIONAL ASSESSMENT: PAIN_FUNCTIONAL_ASSESSMENT: PREVENTS OR INTERFERES SOME ACTIVE ACTIVITIES AND ADLS

## 2021-10-16 ASSESSMENT — PAIN DESCRIPTION - ONSET
ONSET: ON-GOING
ONSET: GRADUAL

## 2021-10-16 ASSESSMENT — PAIN DESCRIPTION - FREQUENCY
FREQUENCY: INTERMITTENT
FREQUENCY: INTERMITTENT

## 2021-10-16 ASSESSMENT — PAIN DESCRIPTION - PROGRESSION: CLINICAL_PROGRESSION: NOT CHANGED

## 2021-10-16 ASSESSMENT — PAIN DESCRIPTION - DESCRIPTORS
DESCRIPTORS: ACHING;DISCOMFORT;THROBBING
DESCRIPTORS: THROBBING

## 2021-10-16 NOTE — PROGRESS NOTES
Department of Orthopedic Surgery  Christina Garcia MD      Subjective:  Pt has little complaints today. Surgical findings explained and discussed. Vitals  VITALS:  /71   Pulse 94   Temp 98.1 °F (36.7 °C) (Oral)   Resp 16   Ht 5' 7\" (1.702 m)   Wt 213 lb (96.6 kg)   SpO2 97%   BMI 33.36 kg/m²     PHYSICAL EXAM:    Orientation:  alert and oriented to person, place and time    Left Upper Extremity    Dressing/Incision:  dressing in place, clean, dry, intact. Dressing changed. Wounds repacked with iodoform gauze    Upper Extremity Motor :  Radial, Median, Ulnar, PIN, AIN nerves  intact    Upper Extremity Sensory: intact to light touch    Pulses:   2    Compartments:soft        LABS:  CBC:   Lab Results   Component Value Date    WBC 12.3 10/16/2021    RBC 3.98 10/16/2021    HGB 12.4 10/16/2021    HCT 38.4 10/16/2021    MCV 96.5 10/16/2021    MCH 31.2 10/16/2021    MCHC 32.3 10/16/2021    RDW 14.0 10/16/2021     10/16/2021    MPV 9.8 10/16/2021     Joint aspirate fluid with low suspicion for infection. Crystal analysis still pending    Bursal tissue positive for Staph.  Await sensitivities     ASSESSMENT: POD # 1 Left septic bursa Excisional debridement and joint debridement      PLAN:  1:  Non weight bearing  2:  Nursing to begin daily packing changes   3:  Continue antibiotics as per ID  4:  D/C Plan:  OK for discharge when antibiotics arranged as per ID recommendations and nursing arranged for daily packing changes  5:  Follow up office 10-14 days      Electronically signed by Hung Haro MD on 10/16/2021 at 12:39 PM

## 2021-10-16 NOTE — PROGRESS NOTES
Pharmacy Consultation Note  (Antibiotic Dosing and Monitoring)    Initial consult date: 10/15/21  Consulting physician/provider: Dr. Carol Ann Pena  Drug: Vancomycin  Indication: Bone and Joint Infection     Age/  Gender Height Weight IBW  Allergy Information   61 y.o./male 5' 7\" (170.2 cm) 218 lb (98.9 kg)     Ideal body weight: 66.1 kg (145 lb 11.6 oz)  Adjusted ideal body weight: 78.3 kg (172 lb 10.1 oz)   Patient has no known allergies. Renal Function:  Recent Labs     10/15/21  0104 10/16/21  0234   BUN 11 12   CREATININE 0.8 0.8       Intake/Output Summary (Last 24 hours) at 10/16/2021 1319  Last data filed at 10/16/2021 0748  Gross per 24 hour   Intake 1730 ml   Output    Net 1730 ml       Vancomycin Monitoring:  Trough:  No results for input(s): VANCOTROUGH in the last 72 hours. Random:  No results for input(s): VANCORANDOM in the last 72 hours. Vancomycin Administration Times:  Recent vancomycin administrations                   vancomycin 2000 mg in dextrose 5% 500 ml IVPB (mg) 2,000 mg New Bag 10/15/21 0419                Assessment:  · Patient is a 63 y.o. male who has been initiated on vancomycin  Estimated Creatinine Clearance: 110 mL/min (based on SCr of 0.8 mg/dL). · To dose vancomycin, pharmacy will be utilizing [employer] calculation software for goal AUC/KAITLIN 400-600 mg/L-hr   · Received vancomycin 2000mg x 1 on admission  · 10/16: SCr 0.8 today. Wound culture growing staph aureus, awaiting sensitivities.      Plan:  · Will continue vancomycin 1250 IV every 12 hours  · Check random vancomycin level with morning labs  · Will continue to monitor renal function   · Clinical pharmacy to follow      [place], PharmD Candidate 2022 10/16/2021 1:19 PM

## 2021-10-16 NOTE — PROGRESS NOTES
[facility] Hospitalist   Progress Note    Admitting Date and Time: 10/15/2021  2:14 AM  Admit Dx: Left arm cellulitis [G90.621]  Injury of left elbow, initial encounter [S59.902A]  Sepsis due to cellulitis ([address] [L03.90, A41.9]    Subjective:    Patient was admitted with Left arm cellulitis [Z47.900]  Injury of left elbow, initial encounter [S59.902A]  Sepsis due to cellulitis ([address] [L03.90, A41.9]. Left elbow is extremely sore, however patient says otherwise he feels quite good. No fevers overnight. ROS: denies fever, chills, cp, sob, n/v, HA unless stated above.      lidocaine PF  5 mL IntraDERmal Once    sodium chloride flush  5-40 mL IntraVENous 2 times per day    heparin flush  3 mL IntraVENous 2 times per day    ceFAZolin  2,000 mg IntraVENous Q8H    sodium chloride flush  5-40 mL IntraVENous 2 times per day    enoxaparin  40 mg SubCUTAneous Daily    insulin lispro  0-6 Units SubCUTAneous TID WC    insulin lispro  0-3 Units SubCUTAneous Nightly    influenza virus vaccine  0.5 mL IntraMUSCular Prior to discharge    nicotine  1 patch TransDERmal Daily    vancomycin  1,250 mg IntraVENous Q12H    glipiZIDE  10 mg Oral TID AC    lisinopril  5 mg Oral Daily    metFORMIN  1,000 mg Oral BID WC    metoprolol tartrate  25 mg Oral BID    montelukast  10 mg Oral Nightly    pantoprazole  40 mg Oral QAM AC    sertraline  100 mg Oral Nightly     sodium chloride flush, 5-40 mL, PRN  sodium chloride, 25 mL, PRN  heparin flush, 3 mL, PRN  glucose, 15 g, PRN  dextrose, 12.5 g, PRN  glucagon (rDNA), 1 mg, PRN  dextrose, 100 mL/hr, PRN  sodium chloride flush, 5-40 mL, PRN  sodium chloride, 25 mL, PRN  ondansetron, 4 mg, Q8H PRN   Or  ondansetron, 4 mg, Q6H PRN  polyethylene glycol, 17 g, Daily PRN  acetaminophen, 650 mg, Q6H PRN   Or  acetaminophen, 650 mg, Q6H PRN  albuterol, 2.5 mg, Q4H PRN  morphine, 2 mg, Q4H PRN   Or  morphine, 4 mg, Q4H PRN  oxyCODONE-acetaminophen, 1 tablet, Q4H PRN   Or  oxyCODONE-acetaminophen, 2 tablet, Q4H PRN         Objective:    /62   Pulse 80   Temp 98.3 °F (36.8 °C) (Oral)   Resp 16   Ht 5' 7\" (1.702 m)   Wt 213 lb (96.6 kg)   SpO2 97%   BMI 33.36 kg/m²   General Appearance: alert and oriented to person, place and time, well developed and well- nourished, in no acute distress  Skin: warm and dry, no rash or erythema  Head: normocephalic and atraumatic  Neck: supple and non-tender  Pulmonary/Chest: clear to auscultation bilaterally  Cardiovascular: regular, no murmur appreciated  Abdomen: soft, non-tender, non-distended  Extremities: left arm in large wrap with ace bandage over top    Recent Labs     10/15/21  0104 10/16/21  0234   * 131*   K 5.2* 4.8   CL 93* 100   CO2 17* 21*   BUN 11 12   CREATININE 0.8 0.8   GLUCOSE 128* 194*   CALCIUM 9.4 8.9       Recent Labs     10/15/21  0234 10/16/21  0234   WBC 13.7* 12.3*   RBC 4.31 3.98   HGB 13.4 12.4*   HCT 40.7 38.4   MCV 94.4 96.5   MCH 31.1 31.2   MCHC 32.9 32.3   RDW 14.1 14.0    256   MPV 9.4 9.8       Radiology:   XR HUMERUS LEFT (MIN 2 VIEWS)   Final Result   No acute fracture or malalignment of the left humerus, left elbow, left   forearm, left wrist or left hand. Elbow joint effusion. Soft tissue swelling from distal upper arm to at least the MCP joints, as   described, with marked swelling dorsal to the metacarpals, with likely palmar   swelling as well. Small amount of soft tissue emphysema dorsal to the elbow, compatible with an   element of laceration or puncture wound in the context of trauma history. No generalized/disseminated soft tissue emphysema to suggest gas-forming   infection. XR ELBOW LEFT (MIN 3 VIEWS)   Final Result   No acute fracture or malalignment of the left humerus, left elbow, left   forearm, left wrist or left hand. Elbow joint effusion.       Soft tissue swelling from distal upper arm to at least the MCP joints, as   described, with marked swelling dorsal to the metacarpals, with likely palmar   swelling as well. Small amount of soft tissue emphysema dorsal to the elbow, compatible with an   element of laceration or puncture wound in the context of trauma history. No generalized/disseminated soft tissue emphysema to suggest gas-forming   infection. XR RADIUS ULNA LEFT (2 VIEWS)   Final Result   No acute fracture or malalignment of the left humerus, left elbow, left   forearm, left wrist or left hand. Elbow joint effusion. Soft tissue swelling from distal upper arm to at least the MCP joints, as   described, with marked swelling dorsal to the metacarpals, with likely palmar   swelling as well. Small amount of soft tissue emphysema dorsal to the elbow, compatible with an   element of laceration or puncture wound in the context of trauma history. No generalized/disseminated soft tissue emphysema to suggest gas-forming   infection. XR WRIST LEFT (MIN 3 VIEWS)   Final Result   No acute fracture or malalignment of the left humerus, left elbow, left   forearm, left wrist or left hand. Elbow joint effusion. Soft tissue swelling from distal upper arm to at least the MCP joints, as   described, with marked swelling dorsal to the metacarpals, with likely palmar   swelling as well. Small amount of soft tissue emphysema dorsal to the elbow, compatible with an   element of laceration or puncture wound in the context of trauma history. No generalized/disseminated soft tissue emphysema to suggest gas-forming   infection. XR HAND LEFT (MIN 3 VIEWS)   Final Result   No acute fracture or malalignment of the left humerus, left elbow, left   forearm, left wrist or left hand. Elbow joint effusion. Soft tissue swelling from distal upper arm to at least the MCP joints, as   described, with marked swelling dorsal to the metacarpals, with likely palmar   swelling as well.       Small amount of soft tissue emphysema dorsal to the elbow, compatible with an   element of laceration or puncture wound in the context of trauma history. No generalized/disseminated soft tissue emphysema to suggest gas-forming   infection. Assessment:    Active Problems:    Left arm cellulitis  Resolved Problems:    * No resolved hospital problems. *      Plan:    1. Acute left arm septic olecranon bursitis  Patient status post excisional debridement and bursectomy of left elbow. Case briefly discussed with Dr. Yessy Marrero today. Left elbow joint aspiration at this time does not appear to be infected. Crystal studies are still pending. Appreciate orthopedic and ID help. Currently on vancomycin and cefazolin as Gram stain on cultures showing GPC's. Patient will almost certainly need PICC line and IV antibiotics at home when cultures have fully resulted. 2.  Diabetes type 2  Over the last 24 hours sugars ranging from . Patient currently on his glipizide and Metformin. We will continue this and sliding scale as needed. 3.  Hypertension  Continue Lopressor and lisinopril.     Electronically signed by Ugo Perales MD on 10/16/2021 at 2:45 PM

## 2021-10-16 NOTE — PROGRESS NOTES
[address] [place] [address] [facility]  Progress Note    SUBJECTIVE:  Chief Complaint   Patient presents with    Arm Injury     left elbow after falling on wet grass last Friday     Patient is tolerating medications. No reported adverse drug reactions. No nausea, vomiting, diarrhea. No fevers overnight. + pain left elbow. Review of systems:  As stated above in the chief complaint, otherwise negative. Medications:  Scheduled Meds:   sodium chloride flush  5-40 mL IntraVENous 2 times per day    enoxaparin  40 mg SubCUTAneous Daily    insulin lispro  0-6 Units SubCUTAneous TID WC    insulin lispro  0-3 Units SubCUTAneous Nightly    influenza virus vaccine  0.5 mL IntraMUSCular Prior to discharge    cefepime  2,000 mg IntraVENous Q8H    nicotine  1 patch TransDERmal Daily    vancomycin  1,250 mg IntraVENous Q12H    glipiZIDE  10 mg Oral TID AC    lisinopril  5 mg Oral Daily    metFORMIN  1,000 mg Oral BID WC    metoprolol tartrate  25 mg Oral BID    montelukast  10 mg Oral Nightly    pantoprazole  40 mg Oral QAM AC    sertraline  100 mg Oral Nightly     Continuous Infusions:   dextrose      sodium chloride 700 mL/hr at 10/15/21 1454    sodium chloride 75 mL/hr at 10/16/21 0556     PRN Meds:glucose, dextrose, glucagon (rDNA), dextrose, sodium chloride flush, sodium chloride, ondansetron **OR** ondansetron, polyethylene glycol, acetaminophen **OR** acetaminophen, albuterol, morphine **OR** morphine, oxyCODONE-acetaminophen **OR** oxyCODONE-acetaminophen    OBJECTIVE:  /71   Pulse 94   Temp 98.1 °F (36.7 °C) (Oral)   Resp 16   Ht 5' 7\" (1.702 m)   Wt 213 lb (96.6 kg)   SpO2 97%   BMI 33.36 kg/m²   Temp  Av.3 °F (36.3 °C)  Min: 94.8 °F (34.9 °C)  Max: 98.8 °F (37.1 °C)  Constitutional: The patient is awake, alert, and oriented. Lying in bed in NAD  Skin: Warm and dry. No rashes were noted. HEENT: Round and reactive pupils. Moist mucous membranes.   No ulcerations or thrush. Neck: Supple to movements. Chest: No use of accessory muscles to breathe. Symmetrical expansion. No wheezing, crackles or rhonchi. Cardiovascular: S1 and S2 are rhythmic and regular. No murmurs appreciated. Abdomen: Positive bowel sounds to auscultation. Benign to palpation. No masses felt. No hepatosplenomegaly. Extremities: No clubbing, no cyanosis, no edema. Left arm with ace/splint from proximal to elbow to hand. Digits with cap refill <3 sec, limited ROM fingers d/t wrap/splint  Lines: peripheral    Laboratory and Tests Review:  Lab Results   Component Value Date    WBC 12.3 (H) 10/16/2021    WBC 13.7 (H) 10/15/2021    HGB 12.4 (L) 10/16/2021    HCT 38.4 10/16/2021    MCV 96.5 10/16/2021     10/16/2021     Lab Results   Component Value Date    NEUTROABS 10.20 (H) 10/15/2021     No results found for: CRPHS  No results found for: ALT, AST, GGT, ALKPHOS, BILITOT  Lab Results   Component Value Date     10/16/2021    K 4.8 10/16/2021     10/16/2021    CO2 21 10/16/2021    BUN 12 10/16/2021    CREATININE 0.8 10/16/2021    CREATININE 0.8 10/15/2021    GFRAA >60 10/16/2021    LABGLOM >60 10/16/2021    GLUCOSE 194 10/16/2021    CALCIUM 8.9 10/16/2021     Lab Results   Component Value Date    CRP 22.8 (H) 10/15/2021     Lab Results   Component Value Date    SEDRATE 58 (H) 10/15/2021     Radiology:      Microbiology:   Blood cx no growth  Tissue cx pending, Gram stain GPC    ASSESSMENT:  · Bursitis with cellulitis and infected bursa. Possible septic joint - reviewed op note, cloudy yellow aspirated from synovial fluid     Plan:    · Cont  cefazolin 2 g IV every 8 pending sensitivities  · Vancomycin-pharmacy dosing  · PICC line - d/w pt  · F/u cx  · Monitor labs  · Will follow with you    Catalina Rouse, MARCELLA - CNP  10:31 AM  10/16/2021     Pt seen and examined. Above discussed agree with advanced practice nurse. Labs, cultures, and radiographs reviewed.   Face to Face encounter occurred. Changes made as necessary.      Maye Dumont MD

## 2021-10-17 LAB
ANION GAP SERPL CALCULATED.3IONS-SCNC: 14 MMOL/L (ref 7–16)
BASOPHILS ABSOLUTE: 0.04 E9/L (ref 0–0.2)
BASOPHILS RELATIVE PERCENT: 0.4 % (ref 0–2)
BUN BLDV-MCNC: 10 MG/DL (ref 6–20)
CALCIUM SERPL-MCNC: 9.2 MG/DL (ref 8.6–10.2)
CHLORIDE BLD-SCNC: 100 MMOL/L (ref 98–107)
CO2: 22 MMOL/L (ref 22–29)
CREAT SERPL-MCNC: 0.8 MG/DL (ref 0.7–1.2)
CULTURE SURGICAL: ABNORMAL
EOSINOPHILS ABSOLUTE: 0.07 E9/L (ref 0.05–0.5)
EOSINOPHILS RELATIVE PERCENT: 0.7 % (ref 0–6)
GFR AFRICAN AMERICAN: >60
GFR NON-AFRICAN AMERICAN: >60 ML/MIN/1.73
GLUCOSE BLD-MCNC: 119 MG/DL (ref 74–99)
HCT VFR BLD CALC: 38.4 % (ref 37–54)
HEMOGLOBIN: 12.5 G/DL (ref 12.5–16.5)
IMMATURE GRANULOCYTES #: 0.13 E9/L
IMMATURE GRANULOCYTES %: 1.4 % (ref 0–5)
LYMPHOCYTES ABSOLUTE: 1.21 E9/L (ref 1.5–4)
LYMPHOCYTES RELATIVE PERCENT: 12.8 % (ref 20–42)
MCH RBC QN AUTO: 31.4 PG (ref 26–35)
MCHC RBC AUTO-ENTMCNC: 32.6 % (ref 32–34.5)
MCV RBC AUTO: 96.5 FL (ref 80–99.9)
METER GLUCOSE: 121 MG/DL (ref 74–99)
METER GLUCOSE: 121 MG/DL (ref 74–99)
METER GLUCOSE: 123 MG/DL (ref 74–99)
METER GLUCOSE: 136 MG/DL (ref 74–99)
MONOCYTES ABSOLUTE: 1.25 E9/L (ref 0.1–0.95)
MONOCYTES RELATIVE PERCENT: 13.2 % (ref 2–12)
NEUTROPHILS ABSOLUTE: 6.77 E9/L (ref 1.8–7.3)
NEUTROPHILS RELATIVE PERCENT: 71.5 % (ref 43–80)
ORGANISM: ABNORMAL
PDW BLD-RTO: 13.9 FL (ref 11.5–15)
PLATELET # BLD: 281 E9/L (ref 130–450)
PMV BLD AUTO: 9.6 FL (ref 7–12)
POTASSIUM REFLEX MAGNESIUM: 4.2 MMOL/L (ref 3.5–5)
RBC # BLD: 3.98 E12/L (ref 3.8–5.8)
SODIUM BLD-SCNC: 136 MMOL/L (ref 132–146)
VANCOMYCIN RANDOM: 9.4 MCG/ML (ref 5–40)
WBC # BLD: 9.5 E9/L (ref 4.5–11.5)
WOUND/ABSCESS: ABNORMAL

## 2021-10-17 PROCEDURE — 2580000003 HC RX 258: Performed by: SPECIALIST

## 2021-10-17 PROCEDURE — 6360000002 HC RX W HCPCS: Performed by: NURSE PRACTITIONER

## 2021-10-17 PROCEDURE — 36569 INSJ PICC 5 YR+ W/O IMAGING: CPT

## 2021-10-17 PROCEDURE — 6360000002 HC RX W HCPCS: Performed by: PHYSICIAN ASSISTANT

## 2021-10-17 PROCEDURE — 6370000000 HC RX 637 (ALT 250 FOR IP): Performed by: INTERNAL MEDICINE

## 2021-10-17 PROCEDURE — 02HV33Z INSERTION OF INFUSION DEVICE INTO SUPERIOR VENA CAVA, PERCUTANEOUS APPROACH: ICD-10-PCS | Performed by: INTERNAL MEDICINE

## 2021-10-17 PROCEDURE — 6360000002 HC RX W HCPCS: Performed by: SPECIALIST

## 2021-10-17 PROCEDURE — C1751 CATH, INF, PER/CENT/MIDLINE: HCPCS

## 2021-10-17 PROCEDURE — 2580000003 HC RX 258: Performed by: NURSE PRACTITIONER

## 2021-10-17 PROCEDURE — 80202 ASSAY OF VANCOMYCIN: CPT

## 2021-10-17 PROCEDURE — 6370000000 HC RX 637 (ALT 250 FOR IP): Performed by: PHYSICIAN ASSISTANT

## 2021-10-17 PROCEDURE — 85025 COMPLETE CBC W/AUTO DIFF WBC: CPT

## 2021-10-17 PROCEDURE — 2500000003 HC RX 250 WO HCPCS: Performed by: NURSE PRACTITIONER

## 2021-10-17 PROCEDURE — 1200000000 HC SEMI PRIVATE

## 2021-10-17 PROCEDURE — 2580000003 HC RX 258: Performed by: CLINICAL NURSE SPECIALIST

## 2021-10-17 PROCEDURE — 6360000002 HC RX W HCPCS: Performed by: CLINICAL NURSE SPECIALIST

## 2021-10-17 PROCEDURE — 36415 COLL VENOUS BLD VENIPUNCTURE: CPT

## 2021-10-17 PROCEDURE — 99232 SBSQ HOSP IP/OBS MODERATE 35: CPT | Performed by: INTERNAL MEDICINE

## 2021-10-17 PROCEDURE — 82962 GLUCOSE BLOOD TEST: CPT

## 2021-10-17 PROCEDURE — 80048 BASIC METABOLIC PNL TOTAL CA: CPT

## 2021-10-17 PROCEDURE — 76937 US GUIDE VASCULAR ACCESS: CPT

## 2021-10-17 PROCEDURE — 2580000003 HC RX 258: Performed by: PHYSICIAN ASSISTANT

## 2021-10-17 RX ORDER — SODIUM CHLORIDE 9 MG/ML
INJECTION, SOLUTION INTRAVENOUS EVERY 8 HOURS
Status: DISCONTINUED | OUTPATIENT
Start: 2021-10-17 | End: 2021-10-19 | Stop reason: ALTCHOICE

## 2021-10-17 RX ADMIN — Medication 20 ML: at 17:55

## 2021-10-17 RX ADMIN — Medication 300 UNITS: at 20:50

## 2021-10-17 RX ADMIN — GLIPIZIDE 10 MG: 5 TABLET ORAL at 11:37

## 2021-10-17 RX ADMIN — Medication 20 ML: at 17:56

## 2021-10-17 RX ADMIN — METFORMIN HYDROCHLORIDE 1000 MG: 1000 TABLET ORAL at 16:06

## 2021-10-17 RX ADMIN — OXYCODONE AND ACETAMINOPHEN 2 TABLET: 5; 325 TABLET ORAL at 20:49

## 2021-10-17 RX ADMIN — OXYCODONE AND ACETAMINOPHEN 2 TABLET: 5; 325 TABLET ORAL at 06:27

## 2021-10-17 RX ADMIN — OXYCODONE AND ACETAMINOPHEN 2 TABLET: 5; 325 TABLET ORAL at 16:05

## 2021-10-17 RX ADMIN — METOPROLOL TARTRATE 25 MG: 25 TABLET, FILM COATED ORAL at 08:22

## 2021-10-17 RX ADMIN — GLIPIZIDE 10 MG: 5 TABLET ORAL at 06:27

## 2021-10-17 RX ADMIN — LIDOCAINE HYDROCHLORIDE 1.5 ML: 10 INJECTION, SOLUTION EPIDURAL; INFILTRATION; INTRACAUDAL; PERINEURAL at 17:40

## 2021-10-17 RX ADMIN — Medication 10 ML: at 20:53

## 2021-10-17 RX ADMIN — PANTOPRAZOLE SODIUM 40 MG: 40 TABLET, DELAYED RELEASE ORAL at 06:27

## 2021-10-17 RX ADMIN — CEFEPIME HYDROCHLORIDE 2000 MG: 2 INJECTION, POWDER, FOR SOLUTION INTRAVENOUS at 16:06

## 2021-10-17 RX ADMIN — SERTRALINE 100 MG: 100 TABLET, FILM COATED ORAL at 20:49

## 2021-10-17 RX ADMIN — LISINOPRIL 5 MG: 5 TABLET ORAL at 08:22

## 2021-10-17 RX ADMIN — Medication 10 ML: at 10:07

## 2021-10-17 RX ADMIN — OXYCODONE AND ACETAMINOPHEN 2 TABLET: 5; 325 TABLET ORAL at 11:40

## 2021-10-17 RX ADMIN — VANCOMYCIN HYDROCHLORIDE 1250 MG: 10 INJECTION, POWDER, LYOPHILIZED, FOR SOLUTION INTRAVENOUS at 06:27

## 2021-10-17 RX ADMIN — CEFEPIME HYDROCHLORIDE 2000 MG: 2 INJECTION, POWDER, FOR SOLUTION INTRAVENOUS at 22:48

## 2021-10-17 RX ADMIN — ENOXAPARIN SODIUM 40 MG: 40 INJECTION SUBCUTANEOUS at 08:21

## 2021-10-17 RX ADMIN — MONTELUKAST SODIUM 10 MG: 10 TABLET, FILM COATED ORAL at 20:49

## 2021-10-17 RX ADMIN — METOPROLOL TARTRATE 25 MG: 25 TABLET, FILM COATED ORAL at 20:49

## 2021-10-17 RX ADMIN — Medication 2000 MG: at 10:06

## 2021-10-17 RX ADMIN — METFORMIN HYDROCHLORIDE 1000 MG: 1000 TABLET ORAL at 08:22

## 2021-10-17 RX ADMIN — SODIUM CHLORIDE: 9 INJECTION, SOLUTION INTRAVENOUS at 11:38

## 2021-10-17 RX ADMIN — GLIPIZIDE 10 MG: 5 TABLET ORAL at 16:06

## 2021-10-17 ASSESSMENT — PAIN DESCRIPTION - PAIN TYPE
TYPE: SURGICAL PAIN
TYPE: ACUTE PAIN

## 2021-10-17 ASSESSMENT — PAIN DESCRIPTION - FREQUENCY
FREQUENCY: CONTINUOUS
FREQUENCY: CONTINUOUS

## 2021-10-17 ASSESSMENT — PAIN DESCRIPTION - LOCATION
LOCATION: ELBOW
LOCATION: ARM

## 2021-10-17 ASSESSMENT — PAIN DESCRIPTION - PROGRESSION: CLINICAL_PROGRESSION: GRADUALLY WORSENING

## 2021-10-17 ASSESSMENT — PAIN DESCRIPTION - ONSET
ONSET: GRADUAL
ONSET: ON-GOING

## 2021-10-17 ASSESSMENT — PAIN - FUNCTIONAL ASSESSMENT
PAIN_FUNCTIONAL_ASSESSMENT: PREVENTS OR INTERFERES SOME ACTIVE ACTIVITIES AND ADLS
PAIN_FUNCTIONAL_ASSESSMENT: ACTIVITIES ARE NOT PREVENTED

## 2021-10-17 ASSESSMENT — PAIN SCALES - GENERAL
PAINLEVEL_OUTOF10: 1
PAINLEVEL_OUTOF10: 3
PAINLEVEL_OUTOF10: 10
PAINLEVEL_OUTOF10: 9
PAINLEVEL_OUTOF10: 8
PAINLEVEL_OUTOF10: 5

## 2021-10-17 ASSESSMENT — PAIN DESCRIPTION - ORIENTATION
ORIENTATION: LEFT
ORIENTATION: LEFT

## 2021-10-17 ASSESSMENT — PAIN DESCRIPTION - DESCRIPTORS
DESCRIPTORS: THROBBING
DESCRIPTORS: ACHING;DISCOMFORT;THROBBING

## 2021-10-17 NOTE — PLAN OF CARE
Problem: Pain:  Goal: Pain level will decrease  Description: Pain level will decrease  10/17/2021 1452 by Andrew Mart RN  Outcome: Met This Shift  10/17/2021 0520 by Ronn Liz RN  Outcome: Met This Shift  Goal: Control of acute pain  Description: Control of acute pain  10/17/2021 1452 by Andrew Mart RN  Outcome: Met This Shift  10/17/2021 0520 by Ronn Liz RN  Outcome: Met This Shift  Goal: Control of chronic pain  Description: Control of chronic pain  10/17/2021 1452 by Andrew Mart RN  Outcome: Met This Shift  10/17/2021 0520 by Ronn Liz RN  Outcome: Met This Shift     Problem: Falls - Risk of:  Goal: Will remain free from falls  Description: Will remain free from falls  10/17/2021 1452 by Andrew Mart RN  Outcome: Met This Shift  10/17/2021 0520 by Ronn Liz RN  Outcome: Met This Shift  Goal: Absence of physical injury  Description: Absence of physical injury  10/17/2021 1452 by Andrew Mart RN  Outcome: Met This Shift  10/17/2021 0520 by Ronn Liz RN  Outcome: Met This Shift

## 2021-10-17 NOTE — PROGRESS NOTES
Pharmacy Consultation Note  (Antibiotic Dosing and Monitoring)    Initial consult date: 10/15/21  Consulting physician/provider: Dr. Delfina Tucker  Drug: Vancomycin  Indication: Bone and Joint Infection     Age/  Gender Height Weight IBW  Allergy Information   61 y.o./male 5' 7\" (170.2 cm) 218 lb (98.9 kg)     Ideal body weight: 66.1 kg (145 lb 11.6 oz)  Adjusted ideal body weight: 78.3 kg (172 lb 10.1 oz)   Patient has no known allergies. Renal Function:  Recent Labs     10/15/21  0104 10/16/21  0234 10/17/21  0540   BUN 11 12 10   CREATININE 0.8 0.8 0.8       Intake/Output Summary (Last 24 hours) at 10/17/2021 0941  Last data filed at 10/17/2021 0748  Gross per 24 hour   Intake 2925 ml   Output    Net 2925 ml       Vancomycin Monitoring:  Trough:  No results for input(s): VANCOTROUGH in the last 72 hours. Random:    Recent Labs     10/17/21  0540   VANCORANDOM 9.4       Vancomycin Administration Times:  Recent vancomycin administrations                   vancomycin (VANCOCIN) 1,250 mg in dextrose 5 % 250 mL IVPB (mg) 1,250 mg New Bag 10/17/21 0627     1,250 mg New Bag 10/16/21 1816     1,250 mg New Bag  0641     1,250 mg New Bag 10/15/21 1807    vancomycin 2000 mg in dextrose 5% 500 ml IVPB (mg) 2,000 mg New Bag 10/15/21 0411                Assessment:  · Patient is a 63 y.o. male who has been initiated on vancomycin  Estimated Creatinine Clearance: 110 mL/min (based on SCr of 0.8 mg/dL).   · To dose vancomycin, pharmacy will be utilizing Vinylm[employer] calculation software for goal AUC/KAITLIN 400-600 mg/L-hr   · Vanco trough on 10/17 @ 0540 = 9.4 mcg/mL    Plan:  · Increase dose to vancomycin 1500 IV every 12 hours, estimated AUC/KAITLIN 484   · Will check levels as needed   · Will continue to monitor renal function   · Clinical pharmacy to follow      Reyes Nottingham, [facility], PharmD Candidate 2022 10/17/2021 9:41 AM

## 2021-10-17 NOTE — PLAN OF CARE
Problem: Pain:  Goal: Pain level will decrease  Description: Pain level will decrease  10/17/2021 0520 by Kit Montiel RN  Outcome: Met This Shift  10/16/2021 1954 by Eden Acosta RN  Outcome: Met This Shift  Goal: Control of acute pain  Description: Control of acute pain  10/17/2021 0520 by Kit Montiel RN  Outcome: Met This Shift  10/16/2021 1954 by Eden Acosta RN  Outcome: Met This Shift  Goal: Control of chronic pain  Description: Control of chronic pain  10/17/2021 0520 by Kit Montiel RN  Outcome: Met This Shift  10/16/2021 1954 by Eden Acosta RN  Outcome: Met This Shift     Problem: Falls - Risk of:  Goal: Will remain free from falls  Description: Will remain free from falls  10/17/2021 0520 by Kit Montiel RN  Outcome: Met This Shift  10/16/2021 1954 by Eden Acosta RN  Outcome: Met This Shift  Goal: Absence of physical injury  Description: Absence of physical injury  10/17/2021 0520 by Kit Montiel RN  Outcome: Met This Shift  10/16/2021 1954 by Eden Acosta RN  Outcome: Met This Shift

## 2021-10-17 NOTE — PROGRESS NOTES
[address] [place] [address] [facility]  [facility]  Progress Note    Face to face encounter  SUBJECTIVE:  Chief Complaint   Patient presents with    Arm Injury     left elbow after falling on wet grass last Friday     Patient is tolerating medications. No reported adverse drug reactions. No nausea, vomiting, diarrhea. No fevers overnight. + pain left elbow. Resting in bed     Review of systems:  As stated above in the chief complaint, otherwise negative.     Medications:  Scheduled Meds:   vancomycin  1,500 mg IntraVENous Q12H    lidocaine PF  5 mL IntraDERmal Once    sodium chloride flush  5-40 mL IntraVENous 2 times per day    heparin flush  3 mL IntraVENous 2 times per day    ceFAZolin  2,000 mg IntraVENous Q8H    sodium chloride flush  5-40 mL IntraVENous 2 times per day    enoxaparin  40 mg SubCUTAneous Daily    insulin lispro  0-6 Units SubCUTAneous TID WC    insulin lispro  0-3 Units SubCUTAneous Nightly    influenza virus vaccine  0.5 mL IntraMUSCular Prior to discharge    nicotine  1 patch TransDERmal Daily    glipiZIDE  10 mg Oral TID AC    lisinopril  5 mg Oral Daily    metFORMIN  1,000 mg Oral BID WC    metoprolol tartrate  25 mg Oral BID    montelukast  10 mg Oral Nightly    pantoprazole  40 mg Oral QAM AC    sertraline  100 mg Oral Nightly     Continuous Infusions:   sodium chloride      dextrose      sodium chloride 700 mL/hr at 10/15/21 1454    sodium chloride 75 mL/hr at 10/17/21 1138     PRN Meds:sodium chloride flush, sodium chloride, heparin flush, glucose, dextrose, glucagon (rDNA), dextrose, sodium chloride flush, sodium chloride, ondansetron **OR** ondansetron, polyethylene glycol, acetaminophen **OR** acetaminophen, albuterol, morphine **OR** morphine, oxyCODONE-acetaminophen **OR** oxyCODONE-acetaminophen    OBJECTIVE:  /83   Pulse 86   Temp 98.1 °F (36.7 °C) (Oral)   Resp 18   Ht 5' 7\" (1.702 m)   Wt 213 lb (96.6 kg)   SpO2 96%   BMI 33.36 kg/m² Temp  Av.2 °F (36.8 °C)  Min: 97.7 °F (36.5 °C)  Max: 98.5 °F (36.9 °C)  Constitutional: The patient is awake, alert, and oriented. Lying in bed in NAD  Skin: Warm and dry. No rashes were noted. HEENT: Round and reactive pupils. Moist mucous membranes. No ulcerations or thrush. Neck: Supple to movements. Chest: No use of accessory muscles to breathe. Symmetrical expansion. No wheezing, crackles or rhonchi. Cardiovascular: S1 and S2 are rhythmic and regular. No murmurs appreciated. Abdomen: Positive bowel sounds to auscultation. Benign to palpation. No masses felt. No hepatosplenomegaly. Extremities: No clubbing, no cyanosis, no edema. Left arm with ace/splint from proximal to elbow to hand. Digits with cap refill <3 sec, limited ROM fingers d/t wrap/splint  Lines: peripheral    Laboratory and Tests Review:  Lab Results   Component Value Date    WBC 9.5 10/17/2021    WBC 12.3 (H) 10/16/2021    WBC 13.7 (H) 10/15/2021    HGB 12.5 10/17/2021    HCT 38.4 10/17/2021    MCV 96.5 10/17/2021     10/17/2021     Lab Results   Component Value Date    NEUTROABS 6.77 10/17/2021    NEUTROABS 10.20 (H) 10/15/2021     No results found for: CRPHS  No results found for: ALT, AST, GGT, ALKPHOS, BILITOT  Lab Results   Component Value Date     10/17/2021    K 4.2 10/17/2021     10/17/2021    CO2 22 10/17/2021    BUN 10 10/17/2021    CREATININE 0.8 10/17/2021    CREATININE 0.8 10/16/2021    CREATININE 0.8 10/15/2021    GFRAA >60 10/17/2021    LABGLOM >60 10/17/2021    GLUCOSE 119 10/17/2021    CALCIUM 9.2 10/17/2021     Lab Results   Component Value Date    CRP 22.8 (H) 10/15/2021     Lab Results   Component Value Date    SEDRATE 58 (H) 10/15/2021     Radiology:  Reviewed     Microbiology:   Blood cx no growth  Tissue cx pending, Gram stain GPC- MSSA  10/15/2021- Surgical cx- MSSA and GNR    ASSESSMENT:  · Bursitis with cellulitis and infected bursa.  Possible septic joint - reviewed op note, cloudy yellow aspirated from synovial fluid  · Leukocytosis - improving      Plan:    · Stop ancef  · Start cefepime 2 grams every 8 hours  · Stop vancomycin   · PICC line - d/w pt- is is agreeable- questions answered   · F/u cx  · Orthopedics following   · Monitor labs  Pt seen and examined. Above discussed agree with advanced practice nurse. Labs, cultures, and radiographs reviewed. Face to Face encounter occurred. Changes made as necessary.      MD Nereida Barreto, APRN - CNS  1:33 PM  10/17/2021

## 2021-10-17 NOTE — PROCEDURES
PICC   Catheter insertion date: 10/17/2021     Product Number:  [id number]   Lot No: [id number]   Gauge: 18 X2   Lumen: 5.5 Stateless/ DOUBLE   R BRACHIAL    Vein Diameter : 0.58CM   Mid upper arm circumference: 32CM   Catheter Length : 44CM   Internal Length: 41CM   External Catheter Length: 3CM   Ultrasound Used: YES  VPS [facility] [place] confirms PICC tip is placed in the lower 1/3 of the SVC or at the Cavoatrial junction. Floor nurse notified PICC is okay to use.    : Lisa Leavitt RN

## 2021-10-18 LAB
ANION GAP SERPL CALCULATED.3IONS-SCNC: 11 MMOL/L (ref 7–16)
BASOPHILS ABSOLUTE: 0.04 E9/L (ref 0–0.2)
BASOPHILS RELATIVE PERCENT: 0.5 % (ref 0–2)
BUN BLDV-MCNC: 12 MG/DL (ref 6–20)
CALCIUM SERPL-MCNC: 9.2 MG/DL (ref 8.6–10.2)
CHLORIDE BLD-SCNC: 101 MMOL/L (ref 98–107)
CO2: 25 MMOL/L (ref 22–29)
CREAT SERPL-MCNC: 0.7 MG/DL (ref 0.7–1.2)
CULTURE SURGICAL: ABNORMAL
EOSINOPHILS ABSOLUTE: 0.11 E9/L (ref 0.05–0.5)
EOSINOPHILS RELATIVE PERCENT: 1.4 % (ref 0–6)
GFR AFRICAN AMERICAN: >60
GFR NON-AFRICAN AMERICAN: >60 ML/MIN/1.73
GLUCOSE BLD-MCNC: 126 MG/DL (ref 74–99)
HCT VFR BLD CALC: 38.1 % (ref 37–54)
HEMOGLOBIN: 12.1 G/DL (ref 12.5–16.5)
IMMATURE GRANULOCYTES #: 0.14 E9/L
IMMATURE GRANULOCYTES %: 1.8 % (ref 0–5)
LYMPHOCYTES ABSOLUTE: 1.33 E9/L (ref 1.5–4)
LYMPHOCYTES RELATIVE PERCENT: 17.1 % (ref 20–42)
MCH RBC QN AUTO: 31.3 PG (ref 26–35)
MCHC RBC AUTO-ENTMCNC: 31.8 % (ref 32–34.5)
MCV RBC AUTO: 98.4 FL (ref 80–99.9)
METER GLUCOSE: 100 MG/DL (ref 74–99)
METER GLUCOSE: 105 MG/DL (ref 74–99)
METER GLUCOSE: 112 MG/DL (ref 74–99)
METER GLUCOSE: 119 MG/DL (ref 74–99)
MONOCYTES ABSOLUTE: 0.99 E9/L (ref 0.1–0.95)
MONOCYTES RELATIVE PERCENT: 12.7 % (ref 2–12)
NEUTROPHILS ABSOLUTE: 5.17 E9/L (ref 1.8–7.3)
NEUTROPHILS RELATIVE PERCENT: 66.5 % (ref 43–80)
ORGANISM: ABNORMAL
PDW BLD-RTO: 13.7 FL (ref 11.5–15)
PLATELET # BLD: 280 E9/L (ref 130–450)
PMV BLD AUTO: 9.2 FL (ref 7–12)
POTASSIUM REFLEX MAGNESIUM: 4.4 MMOL/L (ref 3.5–5)
RBC # BLD: 3.87 E12/L (ref 3.8–5.8)
SODIUM BLD-SCNC: 137 MMOL/L (ref 132–146)
WBC # BLD: 7.8 E9/L (ref 4.5–11.5)

## 2021-10-18 PROCEDURE — 85025 COMPLETE CBC W/AUTO DIFF WBC: CPT

## 2021-10-18 PROCEDURE — 6360000002 HC RX W HCPCS: Performed by: PHYSICIAN ASSISTANT

## 2021-10-18 PROCEDURE — 2580000003 HC RX 258: Performed by: SPECIALIST

## 2021-10-18 PROCEDURE — 1200000000 HC SEMI PRIVATE

## 2021-10-18 PROCEDURE — 2580000003 HC RX 258: Performed by: PHYSICIAN ASSISTANT

## 2021-10-18 PROCEDURE — 6370000000 HC RX 637 (ALT 250 FOR IP): Performed by: INTERNAL MEDICINE

## 2021-10-18 PROCEDURE — 6360000002 HC RX W HCPCS: Performed by: CLINICAL NURSE SPECIALIST

## 2021-10-18 PROCEDURE — 36415 COLL VENOUS BLD VENIPUNCTURE: CPT

## 2021-10-18 PROCEDURE — 80048 BASIC METABOLIC PNL TOTAL CA: CPT

## 2021-10-18 PROCEDURE — 6370000000 HC RX 637 (ALT 250 FOR IP): Performed by: PHYSICIAN ASSISTANT

## 2021-10-18 PROCEDURE — 2580000003 HC RX 258: Performed by: CLINICAL NURSE SPECIALIST

## 2021-10-18 PROCEDURE — 36592 COLLECT BLOOD FROM PICC: CPT

## 2021-10-18 PROCEDURE — 6370000000 HC RX 637 (ALT 250 FOR IP): Performed by: SPECIALIST

## 2021-10-18 PROCEDURE — 82962 GLUCOSE BLOOD TEST: CPT

## 2021-10-18 PROCEDURE — 99232 SBSQ HOSP IP/OBS MODERATE 35: CPT | Performed by: INTERNAL MEDICINE

## 2021-10-18 RX ORDER — DOXYCYCLINE HYCLATE 100 MG/1
100 CAPSULE ORAL EVERY 12 HOURS SCHEDULED
Status: DISCONTINUED | OUTPATIENT
Start: 2021-10-18 | End: 2021-10-19

## 2021-10-18 RX ADMIN — GLIPIZIDE 10 MG: 5 TABLET ORAL at 15:41

## 2021-10-18 RX ADMIN — OXYCODONE AND ACETAMINOPHEN 2 TABLET: 5; 325 TABLET ORAL at 18:38

## 2021-10-18 RX ADMIN — METOPROLOL TARTRATE 25 MG: 25 TABLET, FILM COATED ORAL at 21:24

## 2021-10-18 RX ADMIN — METFORMIN HYDROCHLORIDE 1000 MG: 1000 TABLET ORAL at 09:32

## 2021-10-18 RX ADMIN — LISINOPRIL 5 MG: 5 TABLET ORAL at 09:32

## 2021-10-18 RX ADMIN — SODIUM CHLORIDE: 9 INJECTION, SOLUTION INTRAVENOUS at 01:53

## 2021-10-18 RX ADMIN — SERTRALINE 100 MG: 100 TABLET, FILM COATED ORAL at 21:24

## 2021-10-18 RX ADMIN — MONTELUKAST SODIUM 10 MG: 10 TABLET, FILM COATED ORAL at 21:24

## 2021-10-18 RX ADMIN — CEFEPIME HYDROCHLORIDE 2000 MG: 2 INJECTION, POWDER, FOR SOLUTION INTRAVENOUS at 05:54

## 2021-10-18 RX ADMIN — DOXYCYCLINE HYCLATE 100 MG: 100 CAPSULE ORAL at 21:24

## 2021-10-18 RX ADMIN — GLIPIZIDE 10 MG: 5 TABLET ORAL at 11:37

## 2021-10-18 RX ADMIN — OXYCODONE AND ACETAMINOPHEN 2 TABLET: 5; 325 TABLET ORAL at 09:32

## 2021-10-18 RX ADMIN — GLIPIZIDE 10 MG: 5 TABLET ORAL at 05:54

## 2021-10-18 RX ADMIN — METOPROLOL TARTRATE 25 MG: 25 TABLET, FILM COATED ORAL at 09:32

## 2021-10-18 RX ADMIN — PANTOPRAZOLE SODIUM 40 MG: 40 TABLET, DELAYED RELEASE ORAL at 05:54

## 2021-10-18 RX ADMIN — CEFEPIME HYDROCHLORIDE 2000 MG: 2 INJECTION, POWDER, FOR SOLUTION INTRAVENOUS at 23:43

## 2021-10-18 RX ADMIN — OXYCODONE AND ACETAMINOPHEN 2 TABLET: 5; 325 TABLET ORAL at 14:35

## 2021-10-18 RX ADMIN — CEFEPIME HYDROCHLORIDE 2000 MG: 2 INJECTION, POWDER, FOR SOLUTION INTRAVENOUS at 14:35

## 2021-10-18 RX ADMIN — OXYCODONE AND ACETAMINOPHEN 2 TABLET: 5; 325 TABLET ORAL at 22:56

## 2021-10-18 RX ADMIN — METFORMIN HYDROCHLORIDE 1000 MG: 1000 TABLET ORAL at 16:43

## 2021-10-18 RX ADMIN — SODIUM CHLORIDE: 9 INJECTION, SOLUTION INTRAVENOUS at 15:42

## 2021-10-18 RX ADMIN — ENOXAPARIN SODIUM 40 MG: 40 INJECTION SUBCUTANEOUS at 09:33

## 2021-10-18 ASSESSMENT — PAIN DESCRIPTION - ORIENTATION
ORIENTATION: LEFT

## 2021-10-18 ASSESSMENT — PAIN SCALES - GENERAL
PAINLEVEL_OUTOF10: 8
PAINLEVEL_OUTOF10: 0
PAINLEVEL_OUTOF10: 0
PAINLEVEL_OUTOF10: 7
PAINLEVEL_OUTOF10: 8
PAINLEVEL_OUTOF10: 7
PAINLEVEL_OUTOF10: 8
PAINLEVEL_OUTOF10: 0

## 2021-10-18 ASSESSMENT — PAIN DESCRIPTION - DESCRIPTORS
DESCRIPTORS: THROBBING
DESCRIPTORS: ACHING
DESCRIPTORS: SORE;ACHING

## 2021-10-18 ASSESSMENT — PAIN DESCRIPTION - FREQUENCY
FREQUENCY: CONTINUOUS
FREQUENCY: CONTINUOUS
FREQUENCY: INTERMITTENT

## 2021-10-18 ASSESSMENT — PAIN DESCRIPTION - PROGRESSION
CLINICAL_PROGRESSION: NOT CHANGED

## 2021-10-18 ASSESSMENT — PAIN DESCRIPTION - LOCATION
LOCATION: ARM
LOCATION: ARM
LOCATION: ELBOW;ARM

## 2021-10-18 ASSESSMENT — PAIN DESCRIPTION - ONSET
ONSET: ON-GOING

## 2021-10-18 ASSESSMENT — PAIN - FUNCTIONAL ASSESSMENT
PAIN_FUNCTIONAL_ASSESSMENT: ACTIVITIES ARE NOT PREVENTED
PAIN_FUNCTIONAL_ASSESSMENT: ACTIVITIES ARE NOT PREVENTED
PAIN_FUNCTIONAL_ASSESSMENT: PREVENTS OR INTERFERES SOME ACTIVE ACTIVITIES AND ADLS

## 2021-10-18 ASSESSMENT — PAIN DESCRIPTION - PAIN TYPE
TYPE: SURGICAL PAIN
TYPE: ACUTE PAIN;SURGICAL PAIN
TYPE: ACUTE PAIN;SURGICAL PAIN

## 2021-10-18 NOTE — CARE COORDINATION
Social Work discharge planning   Pt said discharge plan remains back home with his brother and sister in law. [facility] following for potential home iv atb need. Will need [facility] order please.    Electronically signed by Corine Saavedra on 10/18/2021 at 1:19 PM

## 2021-10-18 NOTE — PROGRESS NOTES
[address] [place] [address] [facility]  RAJANIIDA  Progress Note    Face to face encounter  SUBJECTIVE:  Chief Complaint   Patient presents with    Arm Injury     left elbow after falling on wet grass last Friday     Patient is tolerating medications. No reported adverse drug reactions. No nausea, vomiting, diarrhea. No fevers overnight. + pain left elbow. Resting in bed, arm is wrapped & elevated on pillow. Review of systems:  As stated above in the chief complaint, otherwise negative.     Medications:  Scheduled Meds:   cefepime  2,000 mg IntraVENous Q8H    sodium chloride flush  5-40 mL IntraVENous 2 times per day    heparin flush  3 mL IntraVENous 2 times per day    sodium chloride flush  5-40 mL IntraVENous 2 times per day    enoxaparin  40 mg SubCUTAneous Daily    insulin lispro  0-6 Units SubCUTAneous TID WC    insulin lispro  0-3 Units SubCUTAneous Nightly    influenza virus vaccine  0.5 mL IntraMUSCular Prior to discharge    nicotine  1 patch TransDERmal Daily    glipiZIDE  10 mg Oral TID AC    lisinopril  5 mg Oral Daily    metFORMIN  1,000 mg Oral BID WC    metoprolol tartrate  25 mg Oral BID    montelukast  10 mg Oral Nightly    pantoprazole  40 mg Oral QAM AC    sertraline  100 mg Oral Nightly     Continuous Infusions:   sodium chloride      sodium chloride      dextrose      sodium chloride 700 mL/hr at 10/15/21 1454    sodium chloride 75 mL/hr at 10/18/21 0153     PRN Meds:sodium chloride flush, sodium chloride, heparin flush, glucose, dextrose, glucagon (rDNA), dextrose, sodium chloride flush, sodium chloride, ondansetron **OR** ondansetron, polyethylene glycol, acetaminophen **OR** acetaminophen, albuterol, morphine **OR** morphine, oxyCODONE-acetaminophen **OR** oxyCODONE-acetaminophen    OBJECTIVE:  /84   Pulse 76   Temp 98.7 °F (37.1 °C) (Oral)   Resp 18   Ht 5' 7\" (1.702 m)   Wt 220 lb (99.8 kg)   SpO2 99%   BMI 34.46 kg/m²   Temp  Av.8 °F (36.6 °C)  Min: 97.4 °F (36.3 °C)  Max: 98.7 °F (37.1 °C)  Constitutional: The patient is awake, alert, and oriented. Lying in bed in NAD  Skin: Warm and dry. No rashes were noted. HEENT: Round and reactive pupils. Moist mucous membranes. No ulcerations or thrush. Neck: Supple to movements. Chest: No use of accessory muscles to breathe. Symmetrical expansion. No wheezing, crackles or rhonchi. Cardiovascular: S1 and S2 are rhythmic and regular. No murmurs appreciated. Abdomen: Positive bowel sounds to auscultation. Benign to palpation. No masses felt. No hepatosplenomegaly. Extremities: minimal edema in the left hand/arm. Left arm with ace/splint from proximal to elbow to hand.  Digits with cap refill <3 sec, limited ROM fingers d/t wrap/splint  Lines: PICC line 10/17/2021 right arm     Laboratory and Tests Review:  Lab Results   Component Value Date    WBC 7.8 10/18/2021    WBC 9.5 10/17/2021    WBC 12.3 (H) 10/16/2021    HGB 12.1 (L) 10/18/2021    HCT 38.1 10/18/2021    MCV 98.4 10/18/2021     10/18/2021     Lab Results   Component Value Date    NEUTROABS 5.17 10/18/2021    NEUTROABS 6.77 10/17/2021    NEUTROABS 10.20 (H) 10/15/2021     No results found for: CRPHS  No results found for: ALT, AST, GGT, ALKPHOS, BILITOT  Lab Results   Component Value Date     10/18/2021    K 4.4 10/18/2021     10/18/2021    CO2 25 10/18/2021    BUN 12 10/18/2021    CREATININE 0.7 10/18/2021    CREATININE 0.8 10/17/2021    CREATININE 0.8 10/16/2021    GFRAA >60 10/18/2021    LABGLOM >60 10/18/2021    GLUCOSE 126 10/18/2021    CALCIUM 9.2 10/18/2021     Lab Results   Component Value Date    CRP 22.8 (H) 10/15/2021     Lab Results   Component Value Date    SEDRATE 58 (H) 10/15/2021     Radiology:  Reviewed     Microbiology:   Blood cx no growth  Tissue cx pending, Gram stain GPC- MSSA  10/15/2021- Surgical cx- MSSA and Group C strep, Enterobacter cloacae     ASSESSMENT:  · Bursitis with cellulitis and infected bursa/ septic joint - reviewed op note, cloudy yellow aspirated from synovial fluid  · Leukocytosis, resolved.      Plan:    · Continue Cefepime 2 grams every 8 hours; will add Doxy for synergy for staph aureus  · PICC line - home IVs for 4 weeks minimum  · F/u cx  · Orthopedics following, wound care per ortho   · Monitor labs    MARCELLA Tran - CNP  12:46 PM  10/18/2021   Pt seen and examined. Above discussed agree with advanced practice nurse. Labs, cultures, and radiographs reviewed. Face to Face encounter occurred. Changes made as necessary.      Lindsay Marquez MD

## 2021-10-18 NOTE — PATIENT CARE CONFERENCE
[facility] Quality Flow/Interdisciplinary Rounds Progress Note        Quality Flow Rounds held on October 18, 2021    Disciplines Attending:  Bedside Nurse, ,  and Nursing Unit Leadership    Meera Ravi was admitted on 10/15/2021  2:14 AM    Anticipated Discharge Date:  Expected Discharge Date: 10/18/21    Disposition:    Demetris Score:  Demetris Scale Score: 21    Readmission Risk              Risk of Unplanned Readmission:  11           Discussed patient goal for the day, patient clinical progression, and barriers to discharge.   The following Goal(s) of the Day/Commitment(s) have been identified:  IV Antibiotics - Obtain Infectious Disease Discharge Orders      Elian Harris RN  October 18, 2021

## 2021-10-18 NOTE — PROGRESS NOTES
[facility]   Progress Note    Admitting Date and Time: 10/15/2021  2:14 AM  Admit Dx: Left arm cellulitis [H63.997]  Injury of left elbow, initial encounter [S59.902A]  Sepsis due to cellulitis ([address] [L03.90, A41.9]    Subjective:    Patient was admitted with Left arm cellulitis [A81.288]  Injury of left elbow, initial encounter [S59.902A]  Sepsis due to cellulitis ([address] [L03.90, A41.9]. Patient still doing well. Tolerating pain of the left elbow. Has no other complaints. ROS: denies fever, chills, cp, sob, n/v, HA unless stated above.      doxycycline hyclate  100 mg Oral 2 times per day    cefepime  2,000 mg IntraVENous Q8H    sodium chloride flush  5-40 mL IntraVENous 2 times per day    heparin flush  3 mL IntraVENous 2 times per day    sodium chloride flush  5-40 mL IntraVENous 2 times per day    enoxaparin  40 mg SubCUTAneous Daily    insulin lispro  0-6 Units SubCUTAneous TID WC    insulin lispro  0-3 Units SubCUTAneous Nightly    influenza virus vaccine  0.5 mL IntraMUSCular Prior to discharge    nicotine  1 patch TransDERmal Daily    glipiZIDE  10 mg Oral TID AC    lisinopril  5 mg Oral Daily    metFORMIN  1,000 mg Oral BID WC    metoprolol tartrate  25 mg Oral BID    montelukast  10 mg Oral Nightly    pantoprazole  40 mg Oral QAM AC    sertraline  100 mg Oral Nightly     sodium chloride flush, 5-40 mL, PRN  sodium chloride, 25 mL, PRN  heparin flush, 3 mL, PRN  glucose, 15 g, PRN  dextrose, 12.5 g, PRN  glucagon (rDNA), 1 mg, PRN  dextrose, 100 mL/hr, PRN  sodium chloride flush, 5-40 mL, PRN  sodium chloride, 25 mL, PRN  ondansetron, 4 mg, Q8H PRN   Or  ondansetron, 4 mg, Q6H PRN  polyethylene glycol, 17 g, Daily PRN  acetaminophen, 650 mg, Q6H PRN   Or  acetaminophen, 650 mg, Q6H PRN  albuterol, 2.5 mg, Q4H PRN  morphine, 2 mg, Q4H PRN   Or  morphine, 4 mg, Q4H PRN  oxyCODONE-acetaminophen, 1 tablet, Q4H PRN   Or  oxyCODONE-acetaminophen, 2 tablet, Q4H PRN         Objective:    BP (!) 167/98   Pulse 85   Temp 98.8 °F (37.1 °C) (Oral)   Resp 18   Ht 5' 7\" (1.702 m)   Wt 220 lb (99.8 kg)   SpO2 99%   BMI 34.46 kg/m²   General Appearance: alert and oriented to person, place and time, well developed and well- nourished, in no acute distress  Skin: warm and dry, no rash or erythema  Head: normocephalic and atraumatic  Neck: supple and non-tender  Pulmonary/Chest: clear to auscultation bilaterally  Cardiovascular: regular, no murmur appreciated  Abdomen: soft, non-tender, non-distended  Extremities: left arm in large wrap with ace bandage over top      Recent Labs     10/16/21  0234 10/17/21  0540 10/18/21  0418   * 136 137   K 4.8 4.2 4.4    100 101   CO2 21* 22 25   BUN 12 10 12   CREATININE 0.8 0.8 0.7   GLUCOSE 194* 119* 126*   CALCIUM 8.9 9.2 9.2       Recent Labs     10/16/21  0234 10/17/21  0540 10/18/21  0418   WBC 12.3* 9.5 7.8   RBC 3.98 3.98 3.87   HGB 12.4* 12.5 12.1*   HCT 38.4 38.4 38.1   MCV 96.5 96.5 98.4   MCH 31.2 31.4 31.3   MCHC 32.3 32.6 31.8*   RDW 14.0 13.9 13.7    281 280   MPV 9.8 9.6 9.2       Radiology:   XR HUMERUS LEFT (MIN 2 VIEWS)   Final Result   No acute fracture or malalignment of the left humerus, left elbow, left   forearm, left wrist or left hand. Elbow joint effusion. Soft tissue swelling from distal upper arm to at least the MCP joints, as   described, with marked swelling dorsal to the metacarpals, with likely palmar   swelling as well. Small amount of soft tissue emphysema dorsal to the elbow, compatible with an   element of laceration or puncture wound in the context of trauma history. No generalized/disseminated soft tissue emphysema to suggest gas-forming   infection. XR ELBOW LEFT (MIN 3 VIEWS)   Final Result   No acute fracture or malalignment of the left humerus, left elbow, left   forearm, left wrist or left hand. Elbow joint effusion.       Soft tissue swelling from distal upper arm to at least the MCP joints, as   described, with marked swelling dorsal to the metacarpals, with likely palmar   swelling as well. Small amount of soft tissue emphysema dorsal to the elbow, compatible with an   element of laceration or puncture wound in the context of trauma history. No generalized/disseminated soft tissue emphysema to suggest gas-forming   infection. XR RADIUS ULNA LEFT (2 VIEWS)   Final Result   No acute fracture or malalignment of the left humerus, left elbow, left   forearm, left wrist or left hand. Elbow joint effusion. Soft tissue swelling from distal upper arm to at least the MCP joints, as   described, with marked swelling dorsal to the metacarpals, with likely palmar   swelling as well. Small amount of soft tissue emphysema dorsal to the elbow, compatible with an   element of laceration or puncture wound in the context of trauma history. No generalized/disseminated soft tissue emphysema to suggest gas-forming   infection. XR WRIST LEFT (MIN 3 VIEWS)   Final Result   No acute fracture or malalignment of the left humerus, left elbow, left   forearm, left wrist or left hand. Elbow joint effusion. Soft tissue swelling from distal upper arm to at least the MCP joints, as   described, with marked swelling dorsal to the metacarpals, with likely palmar   swelling as well. Small amount of soft tissue emphysema dorsal to the elbow, compatible with an   element of laceration or puncture wound in the context of trauma history. No generalized/disseminated soft tissue emphysema to suggest gas-forming   infection. XR HAND LEFT (MIN 3 VIEWS)   Final Result   No acute fracture or malalignment of the left humerus, left elbow, left   forearm, left wrist or left hand. Elbow joint effusion.       Soft tissue swelling from distal upper arm to at least the MCP joints, as   described, with marked swelling dorsal to the metacarpals, with likely palmar   swelling as well. Small amount of soft tissue emphysema dorsal to the elbow, compatible with an   element of laceration or puncture wound in the context of trauma history. No generalized/disseminated soft tissue emphysema to suggest gas-forming   infection. Assessment:    Active Problems:    Left arm cellulitis  Resolved Problems:    * No resolved hospital problems. *      Plan:    1.  Acute left arm septic olecranon bursitis  Patient status post excisional debridement and bursectomy of left elbow. Left elbow joint aspiration at this time does not appear to be infected.  Crystal studies are still pending.     Appreciate orthopedic and ID help.     Originally on vancomycin and cefazolin as Gram stain on cultures showing GPC's.     Now MSSA, group C strp and enterobacter     Antibiotics changed to cefepime. Now with doxy added as synergy for staph aureus.     PICC line placed. Medically stable for discharge when final antibiotics per ID.     2.  Diabetes type 2  Over the last 24 hours sugars ranging from 112-136. Patient currently on his glipizide and Metformin.  We will continue this and sliding scale as needed.     3.  Hypertension  Continue Lopressor and lisinopril.     Electronically signed by Jayden Huerta MD on 10/18/2021 at 6:05 PM

## 2021-10-18 NOTE — PROGRESS NOTES
Physician Progress Note      PATIENT:               Yolanda Garcia  CSN #:                  [id number]  :                       1960  ADMIT DATE:       10/15/2021 2:14 AM  [address] [place] DATE:  RESPONDING  PROVIDER #:        José Miguel Humphreys TEXT:    Dear Attending Physician,    Pt admitted with left arm cellulitis. Pt noted to have elevated WBC 13.7 ,CRP   22.8 ,Procalcitonin 0.08, Vitals 97.3 116 16 109/71. If possible, please   document in the progress notes and discharge summary if you are evaluating and   /or treating any of the following: The medical record reflects the following:  Risk Factors: DM  Clinical Indicators: Per H/P,\". .. Left elbow cellulitis. .Some concern for   possible osteomyelitis. .Does have open wounds . Estmonica Fleming \"  Per Ortho 10/15,\". .Left   septic olecranon bursitis with possible septic elbow arthritis . Estmonica Fleming \" WBC 13.7   CRP 22.8 Procalcitonin 0.08 Vitals 97.3 116 16 109/71  Treatment: IV ATB    Thank you,  Alondra Boo RN [facility]  Clinical Documentation Improvement Specialist  [phone]  Options provided:  -- Sepsis, present on admission  -- Sepsis was ruled out  -- Other - I will add my own diagnosis  -- Disagree - Not applicable / Not valid  -- Disagree - Clinically unable to determine / Unknown  -- Refer to Clinical Documentation Reviewer    PROVIDER RESPONSE TEXT:    This patient has Sepsis which was present on admission.     Query created by: Juan Cee on 10/15/2021 12:41 PM      Electronically signed by:  Frankie Gillespie 10/18/2021 12:37 PM

## 2021-10-18 NOTE — PROGRESS NOTES
Department of Orthopedic Surgery      Subjective:  Pt has little complaints today. Pain controlled. Resting in bed. Vitals  VITALS:  /80   Pulse 90   Temp 97.6 °F (36.4 °C) (Oral)   Resp 18   Ht 5' 7\" (1.702 m)   Wt 220 lb (99.8 kg)   SpO2 96%   BMI 34.46 kg/m²     PHYSICAL EXAM:    Orientation:  alert and oriented to person, place and time    Left Upper Extremity    Dressing/Incision:  dressing in place, clean, dry, intact. + swelling to the left hand.     Upper Extremity Motor :  Radial, Median, Ulnar, PIN, AIN nerves  intact    Upper Extremity Sensory: intact to light touch    Pulses:   2    Compartments:soft        LABS:  CBC:   Lab Results   Component Value Date    WBC 7.8 10/18/2021    RBC 3.87 10/18/2021    HGB 12.1 10/18/2021    HCT 38.1 10/18/2021    MCV 98.4 10/18/2021    MCH 31.3 10/18/2021    MCHC 31.8 10/18/2021    RDW 13.7 10/18/2021     10/18/2021    MPV 9.2 10/18/2021     Surgical culture - MSSA, beta strep group C, enterobacter cloacae complex    ASSESSMENT: POD # 3 Left septic bursa Excisional debridement and joint debridement      PLAN:  1:  Non weight bearing  2:  Nursing to continue daily packing changes   3:  Continue antibiotics as per ID  4:  D/C Plan:  OK for discharge when antibiotics arranged as per ID recommendations and nursing arranged for daily packing changes  5:  Follow up office 10-14 days post op      Electronically signed by AQUILINO Abarca on 10/18/2021 at 7:38 AM

## 2021-10-18 NOTE — PLAN OF CARE
Problem: Pain:  Goal: Pain level will decrease  Description: Pain level will decrease  10/18/2021 0304 by Alesha Watson RN  Outcome: Met This Shift  10/17/2021 1452 by James Houston RN  Outcome: Met This Shift  Goal: Control of acute pain  Description: Control of acute pain  10/18/2021 0304 by Alesha Watson RN  Outcome: Met This Shift  10/17/2021 1452 by James Houston RN  Outcome: Met This Shift  Goal: Control of chronic pain  Description: Control of chronic pain  10/18/2021 0304 by Alesha Watson RN  Outcome: Met This Shift  10/17/2021 1452 by James Houston RN  Outcome: Met This Shift     Problem: Falls - Risk of:  Goal: Will remain free from falls  Description: Will remain free from falls  10/18/2021 0304 by Alesha Watson RN  Outcome: Met This Shift  10/17/2021 1452 by James Houston RN  Outcome: Met This Shift  Goal: Absence of physical injury  Description: Absence of physical injury  10/18/2021 0304 by Alesha Watson RN  Outcome: Met This Shift  10/17/2021 1452 by James Houston RN  Outcome: Met This Shift

## 2021-10-19 VITALS
SYSTOLIC BLOOD PRESSURE: 156 MMHG | BODY MASS INDEX: 34.21 KG/M2 | RESPIRATION RATE: 16 BRPM | WEIGHT: 218 LBS | TEMPERATURE: 98.2 F | DIASTOLIC BLOOD PRESSURE: 76 MMHG | HEIGHT: 67 IN | OXYGEN SATURATION: 95 % | HEART RATE: 74 BPM

## 2021-10-19 LAB
APPEARANCE FLUID: NORMAL
CELL COUNT FLUID TYPE: NORMAL
COLOR FLUID: YELLOW
CRYSTALS, FLUID: NORMAL
METER GLUCOSE: 103 MG/DL (ref 74–99)
METER GLUCOSE: 115 MG/DL (ref 74–99)
METER GLUCOSE: 124 MG/DL (ref 74–99)
MONOCYTE, FLUID: 52 %
NEUTROPHIL, FLUID: 48 %
NUCLEATED CELLS FLUID: 437 /UL
RBC FLUID: 3000 /UL
SOURCE BODY FLUID: NORMAL

## 2021-10-19 PROCEDURE — G0008 ADMIN INFLUENZA VIRUS VAC: HCPCS | Performed by: PHYSICIAN ASSISTANT

## 2021-10-19 PROCEDURE — 6370000000 HC RX 637 (ALT 250 FOR IP): Performed by: REGISTERED NURSE

## 2021-10-19 PROCEDURE — 6360000002 HC RX W HCPCS: Performed by: PHYSICIAN ASSISTANT

## 2021-10-19 PROCEDURE — 6360000002 HC RX W HCPCS: Performed by: REGISTERED NURSE

## 2021-10-19 PROCEDURE — 90686 IIV4 VACC NO PRSV 0.5 ML IM: CPT | Performed by: PHYSICIAN ASSISTANT

## 2021-10-19 PROCEDURE — 6370000000 HC RX 637 (ALT 250 FOR IP): Performed by: INTERNAL MEDICINE

## 2021-10-19 PROCEDURE — 99239 HOSP IP/OBS DSCHRG MGMT >30: CPT | Performed by: INTERNAL MEDICINE

## 2021-10-19 PROCEDURE — 6360000002 HC RX W HCPCS: Performed by: NURSE PRACTITIONER

## 2021-10-19 PROCEDURE — 6360000002 HC RX W HCPCS: Performed by: CLINICAL NURSE SPECIALIST

## 2021-10-19 PROCEDURE — 6370000000 HC RX 637 (ALT 250 FOR IP): Performed by: SPECIALIST

## 2021-10-19 PROCEDURE — 6370000000 HC RX 637 (ALT 250 FOR IP): Performed by: PHYSICIAN ASSISTANT

## 2021-10-19 PROCEDURE — 2580000003 HC RX 258: Performed by: CLINICAL NURSE SPECIALIST

## 2021-10-19 PROCEDURE — 82962 GLUCOSE BLOOD TEST: CPT

## 2021-10-19 PROCEDURE — 2580000003 HC RX 258: Performed by: SPECIALIST

## 2021-10-19 RX ORDER — LEVOFLOXACIN 750 MG/1
750 TABLET ORAL DAILY
Qty: 42 TABLET | Refills: 0 | Status: SHIPPED | OUTPATIENT
Start: 2021-10-19 | End: 2021-11-30

## 2021-10-19 RX ADMIN — SODIUM CHLORIDE: 9 INJECTION, SOLUTION INTRAVENOUS at 04:00

## 2021-10-19 RX ADMIN — METFORMIN HYDROCHLORIDE 1000 MG: 1000 TABLET ORAL at 16:03

## 2021-10-19 RX ADMIN — METFORMIN HYDROCHLORIDE 1000 MG: 1000 TABLET ORAL at 08:32

## 2021-10-19 RX ADMIN — LEVOFLOXACIN 750 MG: 500 TABLET, FILM COATED ORAL at 13:20

## 2021-10-19 RX ADMIN — PANTOPRAZOLE SODIUM 40 MG: 40 TABLET, DELAYED RELEASE ORAL at 06:43

## 2021-10-19 RX ADMIN — OXYCODONE AND ACETAMINOPHEN 2 TABLET: 5; 325 TABLET ORAL at 06:43

## 2021-10-19 RX ADMIN — METOPROLOL TARTRATE 25 MG: 25 TABLET, FILM COATED ORAL at 08:32

## 2021-10-19 RX ADMIN — LISINOPRIL 5 MG: 5 TABLET ORAL at 08:32

## 2021-10-19 RX ADMIN — GLIPIZIDE 10 MG: 5 TABLET ORAL at 16:03

## 2021-10-19 RX ADMIN — INFLUENZA A VIRUS A/VICTORIA/2570/2019 IVR-215 (H1N1) ANTIGEN (PROPIOLACTONE INACTIVATED), INFLUENZA A VIRUS A/CAMBODIA/E0826360/2020 IVR-224 (H3N2) ANTIGEN (PROPIOLACTONE INACTIVATED), INFLUENZA B VIRUS B/VICTORIA/705/2018 BVR-11 ANTIGEN (PROPIOLACTONE INACTIVATED), INFLUENZA B VIRUS B/PHUKET/3073/2013 BVR-1B ANTIGEN (PROPIOLACTONE INACTIVATED) 0.5 ML: 15; 15; 15; 15 INJECTION, SUSPENSION INTRAMUSCULAR at 17:25

## 2021-10-19 RX ADMIN — GLIPIZIDE 10 MG: 5 TABLET ORAL at 06:43

## 2021-10-19 RX ADMIN — Medication 300 UNITS: at 08:31

## 2021-10-19 RX ADMIN — CEFEPIME HYDROCHLORIDE 2000 MG: 2 INJECTION, POWDER, FOR SOLUTION INTRAVENOUS at 08:31

## 2021-10-19 RX ADMIN — ENOXAPARIN SODIUM 40 MG: 40 INJECTION SUBCUTANEOUS at 08:32

## 2021-10-19 RX ADMIN — OXYCODONE AND ACETAMINOPHEN 2 TABLET: 5; 325 TABLET ORAL at 11:02

## 2021-10-19 RX ADMIN — Medication 2000 MG: at 13:20

## 2021-10-19 RX ADMIN — GLIPIZIDE 10 MG: 5 TABLET ORAL at 11:03

## 2021-10-19 RX ADMIN — SODIUM CHLORIDE: 9 INJECTION, SOLUTION INTRAVENOUS at 12:13

## 2021-10-19 RX ADMIN — OXYCODONE AND ACETAMINOPHEN 2 TABLET: 5; 325 TABLET ORAL at 16:06

## 2021-10-19 RX ADMIN — DOXYCYCLINE HYCLATE 100 MG: 100 CAPSULE ORAL at 08:32

## 2021-10-19 ASSESSMENT — PAIN DESCRIPTION - LOCATION
LOCATION: ARM

## 2021-10-19 ASSESSMENT — PAIN SCALES - GENERAL
PAINLEVEL_OUTOF10: 7
PAINLEVEL_OUTOF10: 5
PAINLEVEL_OUTOF10: 7
PAINLEVEL_OUTOF10: 0
PAINLEVEL_OUTOF10: 8
PAINLEVEL_OUTOF10: 4

## 2021-10-19 ASSESSMENT — PAIN DESCRIPTION - PAIN TYPE
TYPE: ACUTE PAIN;SURGICAL PAIN

## 2021-10-19 ASSESSMENT — PAIN DESCRIPTION - DESCRIPTORS
DESCRIPTORS: ACHING;DISCOMFORT
DESCRIPTORS: THROBBING;DISCOMFORT
DESCRIPTORS: THROBBING;DISCOMFORT

## 2021-10-19 ASSESSMENT — PAIN DESCRIPTION - ONSET
ONSET: ON-GOING
ONSET: ON-GOING
ONSET: AWAKENED FROM SLEEP

## 2021-10-19 ASSESSMENT — PAIN DESCRIPTION - PROGRESSION
CLINICAL_PROGRESSION: GRADUALLY IMPROVING
CLINICAL_PROGRESSION: NOT CHANGED
CLINICAL_PROGRESSION: GRADUALLY IMPROVING

## 2021-10-19 ASSESSMENT — PAIN DESCRIPTION - FREQUENCY
FREQUENCY: INTERMITTENT
FREQUENCY: CONTINUOUS
FREQUENCY: CONTINUOUS

## 2021-10-19 ASSESSMENT — PAIN DESCRIPTION - ORIENTATION
ORIENTATION: LEFT
ORIENTATION: LEFT
ORIENTATION: LEFT;OTHER (COMMENT)

## 2021-10-19 ASSESSMENT — PAIN - FUNCTIONAL ASSESSMENT: PAIN_FUNCTIONAL_ASSESSMENT: ACTIVITIES ARE NOT PREVENTED

## 2021-10-19 NOTE — PROGRESS NOTES
Department of Orthopedic Surgery      Subjective:  Pt has no complaints today. Pain controlled. Resting in bed. Hand swelling has improved. Vitals  VITALS:  BP (!) 158/84   Pulse 86   Temp 97.7 °F (36.5 °C) (Oral)   Resp 16   Ht 5' 7\" (1.702 m)   Wt 218 lb (98.9 kg)   SpO2 96%   BMI 34.14 kg/m²     PHYSICAL EXAM:    Orientation:  alert and oriented to person, place and time    Left Upper Extremity    Dressing/Incision:  dressing in place, clean, dry, intact. improved swelling to the left hand.     Upper Extremity Motor :  Radial, Median, Ulnar, PIN, AIN nerves  intact    Upper Extremity Sensory: intact to light touch    Pulses:   2    Compartments:soft        LABS:  CBC:   Lab Results   Component Value Date    WBC 7.8 10/18/2021    RBC 3.87 10/18/2021    HGB 12.1 10/18/2021    HCT 38.1 10/18/2021    MCV 98.4 10/18/2021    MCH 31.3 10/18/2021    MCHC 31.8 10/18/2021    RDW 13.7 10/18/2021     10/18/2021    MPV 9.2 10/18/2021     Surgical culture - MSSA, beta strep group C, enterobacter cloacae complex    ASSESSMENT: POD # 4 Left septic bursa Excisional debridement and joint debridement      PLAN:  1:  Non weight bearing  2:  Nursing to continue daily packing changes   3:  Continue antibiotics as per ID  4:  D/C Plan:  OK for discharge when antibiotics arranged as per ID recommendations and nursing arranged for daily packing changes  5:  Follow up office 10-14 days post op      Electronically signed by AQUILINO Khan on 10/19/2021 at 8:31 AM

## 2021-10-19 NOTE — PLAN OF CARE
Problem: Pain:  Goal: Pain level will decrease  Description: Pain level will decrease  10/19/2021 0139 by Klarissa Bradford RN  Outcome: Met This Shift     Problem: Pain:  Goal: Control of acute pain  Description: Control of acute pain  Outcome: Met This Shift     Problem: Falls - Risk of:  Goal: Will remain free from falls  Description: Will remain free from falls  10/19/2021 0139 by Klarissa Bradford RN  Outcome: Met This Shift     Problem: Falls - Risk of:  Goal: Absence of physical injury  Description: Absence of physical injury  10/19/2021 0139 by Klarissa Bradford RN  Outcome: Met This Shift

## 2021-10-19 NOTE — DISCHARGE SUMMARY
[id number] [facility] [place] [place] Summary       Catalino Rosas [facility]  Lauren Sumner [phone]    In 1 week  post operative check    Debby Moody MD  [address] [facility] [address]  [address] [phone]    Schedule an appointment as soon as possible for a visit in 2 weeks  For outpatient follow up    [address]   [address] [facility]      Activity level: As tolerated    Diet: ADULT DIET; Regular    Dispo:Home    Condition at discharge: Stable    Patient ID:  Sunitha Suresh  [id number]  89 y.o.  1960    Admit date: 10/15/2021    Discharge date and time:  10/19/2021  6:38 PM    Admission Diagnoses: Active Problems:    Left arm cellulitis  Resolved Problems:    * No resolved hospital problems. *      Discharge Diagnoses: Active Problems:    Left arm cellulitis  Resolved Problems:    * No resolved hospital problems. *      Consults:  IP CONSULT TO ORTHOPEDIC SURGERY  PHARMACY TO DOSE VANCOMYCIN  IP CONSULT TO ORTHOPEDIC SURGERY  IP CONSULT TO INFECTIOUS DISEASES  IP CONSULT TO PHARMACY  IP CONSULT TO IV TEAM  IP CONSULT TO HOME CARE NEEDS    Procedures: Left elbow excisional debridement and bursectomy, left elbow joint aspiration    Hospital Course: Patient was admitted with Left arm cellulitis [U72.791]  Injury of left elbow, initial encounter [S59.772A]  Sepsis due to cellulitis ([address] [L03.90, A41.9]. 32-year-old male with history of diabetes, hypertension who presents with swelling and pain of the left elbow. Also with fevers and chills. Patient was seen by orthopedic surgery and he underwent a left elbow excisional debridement and bursectomy as well as left elbow joint aspiration on 10/15/2021. Patient did extremely well after surgery. He was seen by infectious disease who started cefepime and vancomycin.   This medication regimen was streamlined to Ancef and vancomycin when GPC's and MSSA was grown from the tissue. However then a gram-negative shady grew and cefepime was added. On discharge antibiotics were consolidated to cefazolin IV 2 g every 8 hours and levofloxacin orally 750 mg. His joint aspiration was not infected. He had a PICC line placed for IV antibiotics. He will be seen by home health care after discharge. Patient's diabetes was managed and on discharge all of his other home medications were kept the same as on admission. Discharge Exam:  Vitals:    10/19/21 0007 10/19/21 0437 10/19/21 0630 10/19/21 1115   BP: (!) 131/57 119/66 (!) 158/84 (!) 156/76   Pulse: 78 67 86 74   Resp: 16 18 16 16   Temp: 97.5 °F (36.4 °C) 97.6 °F (36.4 °C) 97.7 °F (36.5 °C) 98.2 °F (36.8 °C)   TempSrc: Oral Oral Oral Oral   SpO2:   96% 95%   Weight: 218 lb (98.9 kg)      Height:         General Appearance: alert and oriented to person, place and time, well developed and well- nourished, in no acute distress  Skin: warm and dry, no rash or erythema  Head: normocephalic and atraumatic  Neck: supple and non-tender  Pulmonary/Chest: clear to auscultation bilaterally  Cardiovascular: regular, no murmur appreciated  Abdomen: soft, non-tender, non-distended  Extremities: left arm in large wrap with ace bandage over top    LABS:  Recent Labs     10/17/21  0540 10/18/21  0418    137   K 4.2 4.4    101   CO2 22 25   BUN 10 12   CREATININE 0.8 0.7   GLUCOSE 119* 126*   CALCIUM 9.2 9.2       Recent Labs     10/17/21  0540 10/18/21  0418   WBC 9.5 7.8   RBC 3.98 3.87   HGB 12.5 12.1*   HCT 38.4 38.1   MCV 96.5 98.4   MCH 31.4 31.3   MCHC 32.6 31.8*   RDW 13.9 13.7    280   MPV 9.6 9.2       Imaging:   XR HUMERUS LEFT (MIN 2 VIEWS)   Final Result   No acute fracture or malalignment of the left humerus, left elbow, left   forearm, left wrist or left hand. Elbow joint effusion.       Soft tissue swelling from distal upper arm to at least the MCP joints, as   described, with marked swelling dorsal to the metacarpals, with likely palmar   swelling as well. Small amount of soft tissue emphysema dorsal to the elbow, compatible with an   element of laceration or puncture wound in the context of trauma history. No generalized/disseminated soft tissue emphysema to suggest gas-forming   infection. XR ELBOW LEFT (MIN 3 VIEWS)   Final Result   No acute fracture or malalignment of the left humerus, left elbow, left   forearm, left wrist or left hand. Elbow joint effusion. Soft tissue swelling from distal upper arm to at least the MCP joints, as   described, with marked swelling dorsal to the metacarpals, with likely palmar   swelling as well. Small amount of soft tissue emphysema dorsal to the elbow, compatible with an   element of laceration or puncture wound in the context of trauma history. No generalized/disseminated soft tissue emphysema to suggest gas-forming   infection. XR RADIUS ULNA LEFT (2 VIEWS)   Final Result   No acute fracture or malalignment of the left humerus, left elbow, left   forearm, left wrist or left hand. Elbow joint effusion. Soft tissue swelling from distal upper arm to at least the MCP joints, as   described, with marked swelling dorsal to the metacarpals, with likely palmar   swelling as well. Small amount of soft tissue emphysema dorsal to the elbow, compatible with an   element of laceration or puncture wound in the context of trauma history. No generalized/disseminated soft tissue emphysema to suggest gas-forming   infection. XR WRIST LEFT (MIN 3 VIEWS)   Final Result   No acute fracture or malalignment of the left humerus, left elbow, left   forearm, left wrist or left hand. Elbow joint effusion. Soft tissue swelling from distal upper arm to at least the MCP joints, as   described, with marked swelling dorsal to the metacarpals, with likely palmar   swelling as well.       Small amount of soft tissue emphysema dorsal to the elbow, compatible with an   element of laceration or puncture wound in the context of trauma history. No generalized/disseminated soft tissue emphysema to suggest gas-forming   infection. XR HAND LEFT (MIN 3 VIEWS)   Final Result   No acute fracture or malalignment of the left humerus, left elbow, left   forearm, left wrist or left hand. Elbow joint effusion. Soft tissue swelling from distal upper arm to at least the MCP joints, as   described, with marked swelling dorsal to the metacarpals, with likely palmar   swelling as well. Small amount of soft tissue emphysema dorsal to the elbow, compatible with an   element of laceration or puncture wound in the context of trauma history. No generalized/disseminated soft tissue emphysema to suggest gas-forming   infection. Patient Instructions:      Medication List      START taking these medications    ceFAZolin  infusion  Commonly known as: ANCEF  Infuse 2,000 mg intravenously every 8 hours For 42 days Compound per protocol     levoFLOXacin 750 MG tablet  Commonly known as: LEVAQUIN  Take 1 tablet by mouth daily     oxyCODONE-acetaminophen 5-325 MG per tablet  Commonly known as: Percocet  Take 1 tablet by mouth every 6 hours as needed for Pain for up to 7 days. CHANGE how you take these medications    aspirin 81 MG chewable tablet  What changed: Another medication with the same name was removed. Continue taking this medication, and follow the directions you see here. metFORMIN 1000 MG tablet  Commonly known as: GLUCOPHAGE  What changed: Another medication with the same name was removed. Continue taking this medication, and follow the directions you see here. metoprolol tartrate 25 MG tablet  Commonly known as: LOPRESSOR  What changed: Another medication with the same name was removed. Continue taking this medication, and follow the directions you see here. omeprazole 40 MG delayed release capsule  Commonly known as: PRILOSEC  What changed: Another medication with the same name was removed. Continue taking this medication, and follow the directions you see here. sertraline 100 MG tablet  Commonly known as: ZOLOFT  What changed: Another medication with the same name was removed. Continue taking this medication, and follow the directions you see here. spironolactone 25 MG tablet  Commonly known as: ALDACTONE  What changed: Another medication with the same name was removed. Continue taking this medication, and follow the directions you see here.         CONTINUE taking these medications    cariprazine hcl 4.5 MG Caps capsule  Commonly known as: Mabel [place] 10 MG tablet  Generic drug: dapagliflozin     folic acid 1 MG tablet  Commonly known as: FOLVITE     glipiZIDE 10 MG tablet  Commonly known as: GLUCOTROL     lisinopril 5 MG tablet  Commonly known as: PRINIVIL;ZESTRIL     montelukast 10 MG tablet  Commonly known as: SINGULAIR        STOP taking these medications    atorvastatin 20 MG tablet  Commonly known as: LIPITOR     fluticasone 50 MCG/ACT nasal spray  Commonly known as: FLONASE     ipratropium 0.02 % nebulizer solution  Commonly known as: ATROVENT     naproxen 500 MG tablet  Commonly known as: NAPROSYN     RA Loratadine 10 MG tablet  Generic drug: loratadine     RA Vitamin D-3 50 MCG (2000 UT) Caps  Generic drug: Cholecalciferol     Stiolto Respimat 2.5-2.5 MCG/ACT Aers  Generic drug: tiotropium-olodaterol           Where to Get Your Medications      These medications were sent to [address] [place] [address] [phone]  [address] [place] [phone]    Phone: [phone]   · levoFLOXacin 750 MG tablet     You can get these medications from any pharmacy    Bring a paper prescription for each of these medications  · ceFAZolin  infusion  · oxyCODONE-acetaminophen 5-325 MG per tablet           Note that more than 30 minutes was spent in preparing discharge papers, discussing discharge with patient, medication review, etc.    Signed:  Electronically signed by Soco Swann MD on 10/19/2021 at 6:38 PM    NOTE: This report was transcribed using voice recognition software. Every effort was made to ensure accuracy; however, inadvertent computerized transcription errors may be present.

## 2021-10-19 NOTE — PROGRESS NOTES
Ancef script faxed to [facility].   Electronically signed by Marie Bertrand RN on 10/19/2021 at 1:06 PM

## 2021-10-19 NOTE — PROGRESS NOTES
[facility] Quality Flow/Interdisciplinary Rounds Progress Note        Quality Flow Rounds held on October 19, 2021    Disciplines Attending:  Bedside Nurse, ,  and Nursing Unit Leadership    Shahnaz Horvath was admitted on 10/15/2021  2:14 AM    Anticipated Discharge Date:  Expected Discharge Date: 10/18/21    Disposition:    Demetris Score:  Demetris Scale Score: 20    Readmission Risk              Risk of Unplanned Readmission:  11           Discussed patient goal for the day, patient clinical progression, and barriers to discharge.   The following Goal(s) of the Day/Commitment(s) have been identified:  IV Antibiotics - Obtain Infectious Disease Discharge Orders      Marie Bertrand RN  October 19, 2021

## 2021-10-19 NOTE — PROGRESS NOTES
Spoke with Dena Jimenez [facility] - ok to give Ancef 6 hours after last dose since renal function is good. Last dose at 1330 - can give next dose at 1930 - and discharge home. [facility] will see pt for tomorrow's dose.     Electronically signed by Stevenson Izquierdo RN on 10/19/2021 at 1:42 PM

## 2021-10-19 NOTE — PROGRESS NOTES
[address] [place] [address] [facility]  RAJANIIDA  Progress Note    Face to face encounter  SUBJECTIVE:  Chief Complaint   Patient presents with    Arm Injury     left elbow after falling on wet grass last Friday     Patient is tolerating medications. No reported adverse drug reactions. No nausea, vomiting, diarrhea. Pain in arm is a little better. No fevers. Review of systems:  As stated above in the chief complaint, otherwise negative.     Medications:  Scheduled Meds:   ceFAZolin  2,000 mg IntraVENous Q8H    levoFLOXacin  750 mg Oral Daily    sodium chloride flush  5-40 mL IntraVENous 2 times per day    heparin flush  3 mL IntraVENous 2 times per day    sodium chloride flush  5-40 mL IntraVENous 2 times per day    enoxaparin  40 mg SubCUTAneous Daily    insulin lispro  0-6 Units SubCUTAneous TID WC    insulin lispro  0-3 Units SubCUTAneous Nightly    influenza virus vaccine  0.5 mL IntraMUSCular Prior to discharge    nicotine  1 patch TransDERmal Daily    glipiZIDE  10 mg Oral TID AC    lisinopril  5 mg Oral Daily    metFORMIN  1,000 mg Oral BID WC    metoprolol tartrate  25 mg Oral BID    montelukast  10 mg Oral Nightly    pantoprazole  40 mg Oral QAM AC    sertraline  100 mg Oral Nightly     Continuous Infusions:   sodium chloride 12.5 mL/hr at 10/19/21 1213    sodium chloride      dextrose      sodium chloride 700 mL/hr at 10/15/21 1454    sodium chloride 75 mL/hr at 10/19/21 0554     PRN Meds:sodium chloride flush, sodium chloride, heparin flush, glucose, dextrose, glucagon (rDNA), dextrose, sodium chloride flush, sodium chloride, ondansetron **OR** ondansetron, polyethylene glycol, acetaminophen **OR** acetaminophen, albuterol, morphine **OR** morphine, oxyCODONE-acetaminophen **OR** oxyCODONE-acetaminophen    OBJECTIVE:  BP (!) 156/76   Pulse 74   Temp 98.2 °F (36.8 °C) (Oral)   Resp 16   Ht 5' 7\" (1.702 m)   Wt 218 lb (98.9 kg)   SpO2 95%   BMI 34.14 kg/m²   Temp  Av.9 °F (36.6 °C) Min: 97.5 °F (36.4 °C)  Max: 98.8 °F (37.1 °C)  Constitutional: The patient is awake, alert, and oriented. Lying in bed in NAD. Skin: Warm and dry. No rashes were noted. HEENT: Round and reactive pupils. Moist mucous membranes. No ulcerations or thrush. Neck: Supple to movements. Chest: No use of accessory muscles to breathe. Symmetrical expansion. No wheezing, crackles or rhonchi. Cardiovascular: S1 and S2 are rhythmic and regular. No murmurs appreciated. Abdomen: Positive bowel sounds to auscultation. Benign to palpation. No masses felt. No hepatosplenomegaly. Extremities: minimal edema in the left hand/arm.  Left arm with ace/splint from proximal to elbow to hand, limited ROM fingers d/t wrap/splint  Lines: PICC line 10/17/2021 right arm     Laboratory and Tests Review:  Lab Results   Component Value Date    WBC 7.8 10/18/2021    WBC 9.5 10/17/2021    WBC 12.3 (H) 10/16/2021    HGB 12.1 (L) 10/18/2021    HCT 38.1 10/18/2021    MCV 98.4 10/18/2021     10/18/2021     Lab Results   Component Value Date    NEUTROABS 5.17 10/18/2021    NEUTROABS 6.77 10/17/2021    NEUTROABS 10.20 (H) 10/15/2021     No results found for: CRPHS  No results found for: ALT, AST, GGT, ALKPHOS, BILITOT  Lab Results   Component Value Date     10/18/2021    K 4.4 10/18/2021     10/18/2021    CO2 25 10/18/2021    BUN 12 10/18/2021    CREATININE 0.7 10/18/2021    CREATININE 0.8 10/17/2021    CREATININE 0.8 10/16/2021    GFRAA >60 10/18/2021    LABGLOM >60 10/18/2021    GLUCOSE 126 10/18/2021    CALCIUM 9.2 10/18/2021     Lab Results   Component Value Date    CRP 22.8 (H) 10/15/2021     Lab Results   Component Value Date    SEDRATE 58 (H) 10/15/2021     Radiology:  Reviewed     Microbiology:   Blood cx no growth  Tissue cx pending, Gram stain GPC- MSSA  10/15/2021- Surgical cx- MSSA and Group C strep, Enterobacter cloacae     ASSESSMENT:  Bursitis with cellulitis and infected bursa/ septic joint - reviewed op note, cloudy yellow aspirated from synovial fluid  Leukocytosis, resolved. Plan:    Consolidate antibiotics to Cefazolin 2g Q8 & Levofloxacin 750mg p.o   PICC line - home IVs for 6 weeks minimum  Med rec complete  Ok to discharge from ID standpoint - follow up in office   Plan is home with Sharon 37, APRN - CNP  12:55 PM  10/19/2021   Pt seen and examined. Above discussed agree with advanced practice nurse. Labs, cultures, and radiographs reviewed. Face to Face encounter occurred. Changes made as necessary.      Herber Murrell MD

## 2021-10-19 NOTE — PROGRESS NOTES
Call made to pharmacy to clearfy the rate for Cefepime. Pharmacist stated the rate should be 12.5mL/hr instead of 100ml/hr. They changed the rate and adjust next dose time to 0000.       Electronically signed by Rodolfo Shell RN on 10/18/2021 at 11:50 PM

## 2021-10-20 LAB
BLOOD CULTURE, ROUTINE: NORMAL
CULTURE, BLOOD 2: NORMAL

## 2021-10-21 LAB
BODY FLUID CULTURE, STERILE: NORMAL
GRAM STAIN RESULT: NORMAL

## 2021-10-26 ENCOUNTER — TELEPHONE (OUTPATIENT)
Dept: ORTHOPEDIC SURGERY | Age: 61
End: 2021-10-26

## 2021-10-26 ENCOUNTER — OFFICE VISIT (OUTPATIENT)
Dept: ORTHOPEDIC SURGERY | Age: 61
End: 2021-10-26

## 2021-10-26 VITALS — HEIGHT: 68 IN | BODY MASS INDEX: 32.28 KG/M2 | WEIGHT: 213 LBS | RESPIRATION RATE: 22 BRPM

## 2021-10-26 DIAGNOSIS — M71.122 SEPTIC BURSITIS OF ELBOW, LEFT: Primary | ICD-10-CM

## 2021-10-26 DIAGNOSIS — G89.18 POST-OPERATIVE PAIN: ICD-10-CM

## 2021-10-26 PROCEDURE — 99024 POSTOP FOLLOW-UP VISIT: CPT | Performed by: ORTHOPAEDIC SURGERY

## 2021-10-26 RX ORDER — OXYCODONE HYDROCHLORIDE AND ACETAMINOPHEN 5; 325 MG/1; MG/1
1 TABLET ORAL EVERY 6 HOURS PRN
Qty: 28 TABLET | Refills: 0 | Status: SHIPPED | OUTPATIENT
Start: 2021-10-26 | End: 2021-11-02

## 2021-10-26 NOTE — PROGRESS NOTES
HPI: Patient presents today postop day #10 status post left septic bursa excisional debridement and joint debridement. Patient follows up with infectious disease next week. He has been tolerating his IV antibiotics. Wound care has been doing daily packing changes. Physical Exam: incision with sutures in place. 2 areas of secondary healing. No fluctuance. No surrounding erythema. No warmth. Good ROm of the elbow. Brisk capillary refill. Otherwise NVI. Assessment: postop day #10 status post left septic bursa excisional debridement and joint debridement    Plan:   Continue nonweightbearing. Patient to continue daily packing changes. Splint fabricated for the patient today. Antibiotics per infectious disease. Pain medication renewed  Follow up in 1 week for wound check and suture removal  All questions and concerns answered. Informed consent was obtained for continued opioid treatment. Patient agrees to continue the current treatment and agrees the benefits of opioid treatment ( decreased pain, improved function) continue to outweigh the potential risks ( confusion, change in thinking ability, depression, dry mouth, nausea, constipation, vomiting, impaired coordination/balance, sleepiness, damage liver or kidneys, opioid-induced hyperalgesia, physical dependence, addiction, withdrawal, respiratory depression and even death). I have seen and evaluated the patient and agree with the above assessment and plan on today's visit. I have performed the key components of the history and physical examination with significant findings of postop. I concur with the findings and plan as documented.     Dinesh Go MD  10/26/2021

## 2021-11-02 ENCOUNTER — OFFICE VISIT (OUTPATIENT)
Dept: ORTHOPEDIC SURGERY | Age: 61
End: 2021-11-02

## 2021-11-02 VITALS — WEIGHT: 213 LBS | RESPIRATION RATE: 18 BRPM | HEIGHT: 68 IN | BODY MASS INDEX: 32.28 KG/M2

## 2021-11-02 DIAGNOSIS — M71.122 SEPTIC BURSITIS OF ELBOW, LEFT: Primary | ICD-10-CM

## 2021-11-02 PROCEDURE — 99024 POSTOP FOLLOW-UP VISIT: CPT | Performed by: ORTHOPAEDIC SURGERY

## 2021-11-02 NOTE — PROGRESS NOTES
2 weeks postop left septic olecranon bursitis debridement. MR is doing very well he is pleased with his progress. He is on his IV antibiotics. No adverse events with antibiotics. His exam: Elbow wound with near full healing. No recurrence of fluctuance. Superior incision and wound is healed in nicely. Inferior wound with mild tunneling and packing in place. No warmth erythema. Full elbow flexion extension. Remains neurovascular intact. Assessment 2 weeks postop    Plan    Sutures removed today. Continue with packing of the inferior wound over the olecranon bursa. He will follow-up in 2 to 3 weeks for reevaluation.

## 2021-11-03 DIAGNOSIS — G89.18 POST-OPERATIVE PAIN: ICD-10-CM

## 2021-11-03 DIAGNOSIS — M71.122 SEPTIC BURSITIS OF ELBOW, LEFT: Primary | ICD-10-CM

## 2021-11-03 RX ORDER — HYDROCODONE BITARTRATE AND ACETAMINOPHEN 5; 325 MG/1; MG/1
1 TABLET ORAL EVERY 6 HOURS PRN
Qty: 28 TABLET | Refills: 0 | Status: SHIPPED | OUTPATIENT
Start: 2021-11-03 | End: 2021-11-10

## 2021-11-03 NOTE — TELEPHONE ENCOUNTER
Patient is requesting a medication refill, OARRS complete. Patient is 2.5 weeks out from surgery of  left septic olecranon bursitis debridement. Patient was last seen on 11/2 and patients next appointment is 11/30. Patient was last given Percocet 5-325 q6hr PRN on 10/26.

## 2021-11-22 LAB
FUNGUS (MYCOLOGY) CULTURE: NORMAL
FUNGUS STAIN: NORMAL

## 2021-11-30 ENCOUNTER — OFFICE VISIT (OUTPATIENT)
Dept: ORTHOPEDIC SURGERY | Age: 61
End: 2021-11-30

## 2021-11-30 VITALS — BODY MASS INDEX: 32.28 KG/M2 | HEIGHT: 68 IN | RESPIRATION RATE: 20 BRPM | WEIGHT: 213 LBS

## 2021-11-30 DIAGNOSIS — M71.122 SEPTIC BURSITIS OF ELBOW, LEFT: Primary | ICD-10-CM

## 2021-11-30 PROCEDURE — 99024 POSTOP FOLLOW-UP VISIT: CPT | Performed by: ORTHOPAEDIC SURGERY

## 2021-11-30 NOTE — PROGRESS NOTES
6 weeks postop left septic olecranon bursitis debridement. Overall he is doing well. He gets his PICC line out tomorrow and follows up with ID in December. No adverse events with antibiotics. Physical exam: Elbow wound with near full healing. One small area of healing left with no drainage. No recurrence of fluctuance. Superior incision and wound is healed in nicely. No warmth erythema. Full elbow flexion extension. Remains neurovascular intact. Assessment: 6 weeks postop    Plan  Patient may discontinue splint. Patient to keep last area of healing covered with a Band-Aid. Continue to follow-up with infectious disease and take antibiotics per infectious disease. Patient to follow-up as needed. All questions answered. I have seen and evaluated the patient and agree with the above assessment and plan on today's visit. I have performed the key components of the history and physical examination with significant findings of postop doing well. I concur with the findings and plan as documented.     Jose Santa MD  11/30/2021

## 2021-12-07 LAB
AFB CULTURE (MYCOBACTERIA): NORMAL
AFB SMEAR: NORMAL
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
## 2022-07-06 ENCOUNTER — OFFICE VISIT (OUTPATIENT)
Dept: FAMILY MEDICINE CLINIC | Age: 62
End: 2022-07-06
Payer: COMMERCIAL

## 2022-07-06 VITALS
HEIGHT: 68 IN | SYSTOLIC BLOOD PRESSURE: 144 MMHG | DIASTOLIC BLOOD PRESSURE: 82 MMHG | HEART RATE: 121 BPM | WEIGHT: 213 LBS | BODY MASS INDEX: 32.28 KG/M2 | OXYGEN SATURATION: 99 %

## 2022-07-06 DIAGNOSIS — M54.6 ACUTE RIGHT-SIDED THORACIC BACK PAIN: Primary | ICD-10-CM

## 2022-07-06 PROCEDURE — 96372 THER/PROPH/DIAG INJ SC/IM: CPT | Performed by: FAMILY MEDICINE

## 2022-07-06 PROCEDURE — 99213 OFFICE O/P EST LOW 20 MIN: CPT | Performed by: FAMILY MEDICINE

## 2022-07-06 PROCEDURE — G8417 CALC BMI ABV UP PARAM F/U: HCPCS | Performed by: FAMILY MEDICINE

## 2022-07-06 PROCEDURE — G8427 DOCREV CUR MEDS BY ELIG CLIN: HCPCS | Performed by: FAMILY MEDICINE

## 2022-07-06 PROCEDURE — 1036F TOBACCO NON-USER: CPT | Performed by: FAMILY MEDICINE

## 2022-07-06 PROCEDURE — 3017F COLORECTAL CA SCREEN DOC REV: CPT | Performed by: FAMILY MEDICINE

## 2022-07-06 RX ORDER — TIZANIDINE 4 MG/1
2-4 TABLET ORAL EVERY 8 HOURS PRN
Qty: 90 TABLET | Refills: 1 | Status: SHIPPED | OUTPATIENT
Start: 2022-07-06

## 2022-07-06 RX ORDER — METHYLPREDNISOLONE 4 MG/1
TABLET ORAL
Qty: 1 KIT | Refills: 0 | Status: SHIPPED | OUTPATIENT
Start: 2022-07-06

## 2022-07-06 RX ORDER — KETOROLAC TROMETHAMINE 30 MG/ML
30 INJECTION, SOLUTION INTRAMUSCULAR; INTRAVENOUS ONCE
Status: COMPLETED | OUTPATIENT
Start: 2022-07-06 | End: 2022-07-06

## 2022-07-06 RX ADMIN — KETOROLAC TROMETHAMINE 30 MG: 30 INJECTION, SOLUTION INTRAMUSCULAR; INTRAVENOUS at 10:40

## 2022-07-06 ASSESSMENT — ENCOUNTER SYMPTOMS
BACK PAIN: 1
NAUSEA: 0
COUGH: 0
DIARRHEA: 0
CONSTIPATION: 0
SHORTNESS OF BREATH: 0
VOMITING: 0
ABDOMINAL PAIN: 0
PHOTOPHOBIA: 0
BLOOD IN STOOL: 0
SORE THROAT: 0

## 2022-07-06 NOTE — PROGRESS NOTES
Tona Cifuentes (:  1960) is a 63 y.o. male,Established patient, here for evaluation of the following chief complaint(s):  Back Pain (x1 month. pain causing sob) and Flank Pain (right, states pulled muscle. states can't lay down)         ASSESSMENT/PLAN:  1. Acute right-sided thoracic back pain  -     XR RIBS RIGHT INCLUDE CHEST (MIN 3 VIEWS); Future  -     ketorolac (TORADOL) injection 30 mg; 30 mg, IntraMUSCular, ONCE, 1 dose, On 22 at 1100Do not administer for more than 5 days  -     tiZANidine (ZANAFLEX) 4 MG tablet; Take 0.5-1 tablets by mouth every 8 hours as needed (back pain), Disp-90 tablet, R-1Normal  -     methylPREDNISolone (MEDROL DOSEPACK) 4 MG tablet; Take by mouth., Disp-1 kit, R-0Normal  This time we will treat symptomatically. Initial review of x-ray shows no acute fracture or dislocation of the ribs and no acute cardiopulmonary process. Final read per radiologist.  Red flags discussed with patient. These occur he is to go directly to the nearest emergency department. Patient voiced understanding. No follow-ups on file. Subjective   SUBJECTIVE/OBJECTIVE:  HPI  Patient presents today for 1 month of back and flank pain. Patient states that he sneezed and started to have issues since that time. He states that over the last month the pain in the right side has been progressively worse. He states that he has been unable to sleep secondary to the pain. Denies any hematuria or urinary discharge. Review of Systems   Constitutional: Negative for chills and fever. HENT: Negative for congestion, hearing loss, nosebleeds and sore throat. Eyes: Negative for photophobia. Respiratory: Negative for cough and shortness of breath. Cardiovascular: Negative for chest pain, palpitations and leg swelling. Gastrointestinal: Negative for abdominal pain, blood in stool, constipation, diarrhea, nausea and vomiting. Endocrine: Negative for polydipsia.    Genitourinary: Negative for dysuria, frequency, hematuria and urgency. Musculoskeletal: Positive for back pain. Negative for myalgias. Skin: Negative. Neurological: Negative for dizziness, tremors, weakness and headaches. Hematological: Does not bruise/bleed easily. Psychiatric/Behavioral: Negative for hallucinations and suicidal ideas. All other systems reviewed and are negative.          Current Outpatient Medications:     tiZANidine (ZANAFLEX) 4 MG tablet, Take 0.5-1 tablets by mouth every 8 hours as needed (back pain), Disp: 90 tablet, Rfl: 1    methylPREDNISolone (MEDROL DOSEPACK) 4 MG tablet, Take by mouth., Disp: 1 kit, Rfl: 0    metFORMIN (GLUCOPHAGE) 1000 MG tablet, Take 1,000 mg by mouth 2 times daily (with meals), Disp: , Rfl:     omeprazole (PRILOSEC) 40 MG delayed release capsule, Take 40 mg by mouth daily , Disp: , Rfl:     glipiZIDE (GLUCOTROL) 10 MG tablet, Take 10 mg by mouth 3 times daily (before meals), Disp: , Rfl:     montelukast (SINGULAIR) 10 MG tablet, Take 10 mg by mouth nightly , Disp: , Rfl:     spironolactone (ALDACTONE) 25 MG tablet, Take 25 mg by mouth daily, Disp: , Rfl:     metoprolol tartrate (LOPRESSOR) 25 MG tablet, Take 25 mg by mouth 2 times daily, Disp: , Rfl:     sertraline (ZOLOFT) 100 MG tablet, Take 100 mg by mouth nightly Take 1 and a 1/2 tablets by mouth every evening, Disp: , Rfl:     cariprazine hcl (VRAYLAR) 4.5 MG CAPS capsule, Take 1.5 mg by mouth nightly, Disp: , Rfl:     aspirin 81 MG chewable tablet, Take 81 mg by mouth daily, Disp: , Rfl:     lisinopril (PRINIVIL;ZESTRIL) 5 MG tablet, Take 5 mg by mouth daily, Disp: , Rfl:     dapagliflozin (FARXIGA) 10 MG tablet, Take 10 mg by mouth every morning, Disp: , Rfl:     folic acid (FOLVITE) 1 MG tablet, Take 1 mg by mouth daily, Disp: , Rfl:    Patient Active Problem List   Diagnosis    COPD (chronic obstructive pulmonary disease) (HCC)    Precordial pain    Mixed hyperlipidemia    PSVT ([facility]oxysmal supraventricular tachycardia) [facility] [address]    Hypertension    Hyperlipidemia    Diabetes mellitus ([facility]    Left arm cellulitis     Past Medical History:   Diagnosis Date    Diabetes mellitus ([facility] [address]     Hyperlipidemia     Hypertension      Past Surgical History:   Procedure Laterality Date    APPENDECTOMY      ARM SURGERY Left 10/15/2021    LEFT ELBOW INCISION AND DRAINAGE performed by Laina Bain MD at [address] DOUBLE  10/17/2021         HERNIA REPAIR  2008 & 2013     Social History     Socioeconomic History    Marital status: Single     Spouse name: Not on file    Number of children: Not on file    Years of education: Not on file    Highest education level: Not on file   Occupational History    Not on file   Tobacco Use    Smoking status: Former Smoker     Quit date: 1/29/2019     Years since quitting: 3.4    Smokeless tobacco: Never Used   Vaping Use    Vaping Use: Every day    Substances: Always   Substance and Sexual Activity    Alcohol use: Never    Drug use: Never    Sexual activity: Not on file   Other Topics Concern    Not on file   Social History Narrative    Not on file     Social Determinants of Health     Financial Resource Strain:     Difficulty of Paying Living Expenses: Not on file   Food Insecurity:     Worried About [address] in the Last Year: Not on file    [address] in the Last Year: Not on file   Transportation Needs:     Lack of Transportation (Medical): Not on file    Lack of Transportation (Non-Medical):  Not on file   Physical Activity:     Days of Exercise per Week: Not on file    Minutes of Exercise per Session: Not on file   Stress:     Feeling of Stress : Not on file   Social Connections:     Frequency of Communication with Friends and Family: Not on file    Frequency of Social Gatherings with Friends and Family: Not on file    Attends Rastafari Services: Not on file    Active Member of Clubs or Organizations: Not on file    Attends Club or Organization Meetings: Not on file    Marital Status: Not on file   Intimate Partner Violence:     Fear of Current or Ex-Partner: Not on file    Emotionally Abused: Not on file    Physically Abused: Not on file    Sexually Abused: Not on file   Housing Stability:     Unable to Pay for Housing in the Last Year: Not on file    Number of [place] in the Last Year: Not on file    Unstable Housing in the Last Year: Not on file     History reviewed. No pertinent family history. There are no preventive care reminders to display for this patient. There are no preventive care reminders to display for this patient. Diabetes Management   Topic Date Due    Diabetic foot exam  Never done    Lipids  Never done    Diabetic microalbuminuria test  Never done    Diabetic retinal exam  Never done      Health Maintenance Due   Topic    Shingles vaccine (1 of 2)      Health Maintenance   Topic Date Due    COVID-19 Vaccine (1) Never done    Pneumococcal 0-64 years Vaccine (1 - PCV) Never done    Diabetic foot exam  Never done    Lipids  Never done    Depression Screen  Never done    HIV screen  Never done    Diabetic microalbuminuria test  Never done    Diabetic retinal exam  Never done    Hepatitis C screen  Never done    Hepatitis B vaccine (1 of 3 - Risk 3-dose series) Never done    Prostate Specific Antigen (PSA) Screening or Monitoring  Never done    Colorectal Cancer Screen  Never done    Shingles vaccine (1 of 2) Never done    Flu vaccine (1) 09/01/2022    A1C test (Diabetic or Prediabetic)  10/15/2022    DTaP/Tdap/Td vaccine (2 - Td or Tdap) 10/15/2031    Hepatitis A vaccine  Aged Out    Hib vaccine  Aged Out    Meningococcal (ACWY) vaccine  Aged Out      Health Maintenance Due   Topic Date Due    Prostate Specific Antigen (PSA) Screening or Monitoring  Never done      There are no preventive care reminders to display for this patient.      BP (!) 144/82   Pulse (!) 121   Ht 5' 8\" (1.727 m)   Wt 213 lb (96.6 kg)   SpO2 99%   BMI 32.39 kg/m²     Objective   Physical Exam  Vitals reviewed. HENT:      Head: Normocephalic and atraumatic. Eyes:      General: No scleral icterus. Conjunctiva/sclera: Conjunctivae normal.      Pupils: Pupils are equal, round, and reactive to light. Neck:      Thyroid: No thyromegaly. Cardiovascular:      Rate and Rhythm: Regular rhythm. Tachycardia present. Heart sounds: Normal heart sounds. No murmur heard. Pulmonary:      Effort: Pulmonary effort is normal.      Breath sounds: Normal breath sounds. No rales. Abdominal:      General: Bowel sounds are normal. There is no distension. Palpations: Abdomen is soft. Tenderness: There is no abdominal tenderness. Musculoskeletal:         General: Tenderness and signs of injury present. Cervical back: Neck supple. Thoracic back: Spasms and tenderness present. Decreased range of motion. Back:    Lymphadenopathy:      Cervical: No cervical adenopathy. Skin:     General: Skin is warm and dry. Findings: No erythema or rash. Neurological:      Mental Status: He is alert and oriented to person, place, and time. Cranial Nerves: No cranial nerve deficit. Psychiatric:         Judgment: Judgment normal.                  An electronic signature was used to authenticate this note.     --Cyrus Leyden Deist, DO

## 2022-12-16 ENCOUNTER — OFFICE VISIT (OUTPATIENT)
Dept: FAMILY MEDICINE CLINIC | Age: 62
End: 2022-12-16

## 2022-12-16 VITALS
BODY MASS INDEX: 32.28 KG/M2 | OXYGEN SATURATION: 98 % | DIASTOLIC BLOOD PRESSURE: 76 MMHG | SYSTOLIC BLOOD PRESSURE: 122 MMHG | WEIGHT: 213 LBS | TEMPERATURE: 97.8 F | HEART RATE: 78 BPM | HEIGHT: 68 IN

## 2022-12-16 DIAGNOSIS — E11.9 TYPE 2 DIABETES MELLITUS WITHOUT COMPLICATION, UNSPECIFIED WHETHER LONG TERM INSULIN USE (HCC): ICD-10-CM

## 2022-12-16 DIAGNOSIS — M54.6 ACUTE RIGHT-SIDED THORACIC BACK PAIN: ICD-10-CM

## 2022-12-16 RX ORDER — TIZANIDINE 4 MG/1
2-4 TABLET ORAL EVERY 8 HOURS PRN
Qty: 30 TABLET | Refills: 0 | Status: SHIPPED | OUTPATIENT
Start: 2022-12-16

## 2022-12-16 RX ORDER — KETOROLAC TROMETHAMINE 30 MG/ML
30 INJECTION, SOLUTION INTRAMUSCULAR; INTRAVENOUS ONCE
Status: COMPLETED | OUTPATIENT
Start: 2022-12-16 | End: 2022-12-16

## 2022-12-16 RX ADMIN — KETOROLAC TROMETHAMINE 30 MG: 30 INJECTION, SOLUTION INTRAMUSCULAR; INTRAVENOUS at 11:25

## 2022-12-17 ASSESSMENT — ENCOUNTER SYMPTOMS
BACK PAIN: 1
SHORTNESS OF BREATH: 0
ABDOMINAL PAIN: 0

## 2022-12-17 NOTE — PROGRESS NOTES
[address] [place]     22  Ange Guerrero : 1960 Sex: male  Age: 63 y.o. Chief Complaint   Patient presents with    Back Pain     Pulled a muscle in his back; onset yesterday; changing the toilet at his house       HPI    Patient presents to express care today complaining of back pain. States he thinks he pulled a muscle. He was lifting and replacing his toilet at home when he felt a pop in his back. States he was up all night with discomfort. Describes the pain mid back area on the right side. States he has history of back issues. He took some aspirin last night. Denies any radicular symptoms. No numbness or tingling. Denies any weakness. Review of Systems   Constitutional:  Negative for chills and fever. Respiratory:  Negative for shortness of breath. Cardiovascular:  Negative for chest pain. Gastrointestinal:  Negative for abdominal pain. Endocrine:        Type 2 diabetes   Genitourinary: Negative. Musculoskeletal:  Positive for back pain. See above   Skin:  Negative for rash. Neurological:  Negative for weakness and numbness. REST OF PERTINENT ROS GONE OVER AND WAS NEGATIVE.                  Current Outpatient Medications:     tiZANidine (ZANAFLEX) 4 MG tablet, Take 0.5-1 tablets by mouth every 8 hours as needed (back pain), Disp: 30 tablet, Rfl: 0    metFORMIN (GLUCOPHAGE) 1000 MG tablet, Take 1,000 mg by mouth 2 times daily (with meals), Disp: , Rfl:     omeprazole (PRILOSEC) 40 MG delayed release capsule, Take 40 mg by mouth daily , Disp: , Rfl:     glipiZIDE (GLUCOTROL) 10 MG tablet, Take 10 mg by mouth 3 times daily (before meals), Disp: , Rfl:     montelukast (SINGULAIR) 10 MG tablet, Take 10 mg by mouth nightly , Disp: , Rfl:     spironolactone (ALDACTONE) 25 MG tablet, Take 25 mg by mouth daily, Disp: , Rfl:     metoprolol tartrate (LOPRESSOR) 25 MG tablet, Take 25 mg by mouth 2 times daily, Disp: , Rfl:     sertraline (ZOLOFT) 100 MG tablet, Take 100 mg by mouth nightly Take 1 and a 1/2 tablets by mouth every evening, Disp: , Rfl:     cariprazine hcl (VRAYLAR) 4.5 MG CAPS capsule, Take 1.5 mg by mouth nightly, Disp: , Rfl:     aspirin 81 MG chewable tablet, Take 81 mg by mouth daily, Disp: , Rfl:     lisinopril (PRINIVIL;ZESTRIL) 5 MG tablet, Take 5 mg by mouth daily, Disp: , Rfl:     dapagliflozin (FARXIGA) 10 MG tablet, Take 10 mg by mouth every morning, Disp: , Rfl:     folic acid (FOLVITE) 1 MG tablet, Take 1 mg by mouth daily, Disp: , Rfl:   No Known Allergies    Past Medical History:   Diagnosis Date    [facility]     Hyperlipidemia     Hypertension      Past Surgical History:   Procedure Laterality Date    APPENDECTOMY      ARM SURGERY Left 10/15/2021    LEFT ELBOW INCISION AND DRAINAGE performed by Claudia Plasencia MD at [address] [place] [address]  10/17/2021         HERNIA REPAIR  2008 & 2013     No family history on file.   Social History     Socioeconomic History    Marital status: Single     Spouse name: Not on file    Number of children: Not on file    Years of education: Not on file    Highest education level: Not on file   Occupational History    Not on file   Tobacco Use    Smoking status: Former     Types: Cigarettes     Quit date: 1/29/2019     Years since quitting: 3.8    Smokeless tobacco: Never   Vaping Use    Vaping Use: Every day    Substances: Always   Substance and Sexual Activity    Alcohol use: Never    Drug use: Never    Sexual activity: Not on file   Other Topics Concern    Not on file   Social History Narrative    Not on file     Social Determinants of Health     Financial Resource Strain: Not on file   Food Insecurity: Not on file   Transportation Needs: Not on file   Physical Activity: Not on file   Stress: Not on file   Social Connections: Not on file   Intimate Partner Violence: Not on file   Housing Stability: Not on file       Vitals:    12/16/22 1048   BP: 122/76   Pulse: 78   Temp: 97.8 °F (36.6 °C)   TempSrc: Temporal   SpO2: 98%   Weight: 213 lb (96.6 kg)   Height: 5' 8\" (1.727 m)       Physical Exam  Vitals and nursing note reviewed. Constitutional:       General: He is in acute distress. Comments: Back pain   Cardiovascular:      Rate and Rhythm: Normal rate and regular rhythm. Pulmonary:      Effort: Pulmonary effort is normal.      Breath sounds: Normal breath sounds. Abdominal:      Tenderness: There is no abdominal tenderness. Musculoskeletal:         General: Tenderness present. No deformity or signs of injury. Cervical back: Normal range of motion and neck supple. No tenderness. Comments: Does have reproducible tenderness to palpation to the right back mid to upper region   Skin:     General: Skin is warm and dry. Findings: No bruising, erythema or rash. Neurological:      General: No focal deficit present. Mental Status: He is alert and oriented to person, place, and time. Sensory: No sensory deficit. Motor: No weakness. Psychiatric:         Mood and Affect: Mood normal.         Behavior: Behavior normal.         Thought Content: Thought content normal.         Judgment: Judgment normal.                 Assessment and Plan:  Dorian was seen today for back pain. Diagnoses and all orders for this visit:    Acute right-sided thoracic back pain  -     tiZANidine (ZANAFLEX) 4 MG tablet; Take 0.5-1 tablets by mouth every 8 hours as needed (back pain)  -     XR THORACIC SPINE (3 VIEWS); Future    Type 2 diabetes mellitus without complication, unspecified whether long term insulin use (HCC)    Other orders  -     ketorolac (TORADOL) injection 30 mg    Plan: Shot of Toradol. Tizanidine as muscle relaxant. Ice. Potential side effects of medication. Limit lifting. Follow with PCP. Notify us if not improving. Return in about 1 week (around 12/23/2022). Seen By:  Sarah Fan MD      *Document was created using voice recognition software. Note was reviewed however may contain grammatical errors.

## 2022-12-18 ENCOUNTER — TELEPHONE (OUTPATIENT)
Dept: FAMILY MEDICINE CLINIC | Age: 62
End: 2022-12-18

## 2022-12-18 NOTE — TELEPHONE ENCOUNTER
X-ray shows chronic changes to the back with compression deformity at T8 which they state looks chronic. They also do incidentally mention changes in the right lung base but are consistent with either atelectasis or infiltrate. He had no respiratory symptoms when I saw him. He needs to follow-up with his PCP to review all of this and have chest x-ray repeated sometime in the near future.   Please send copy of theresult to his PCP

## 2023-08-18 ENCOUNTER — APPOINTMENT (OUTPATIENT)
Dept: GENERAL RADIOLOGY | Age: 63
End: 2023-08-18
Payer: COMMERCIAL

## 2023-08-18 ENCOUNTER — APPOINTMENT (OUTPATIENT)
Dept: CT IMAGING | Age: 63
End: 2023-08-18
Payer: COMMERCIAL

## 2023-08-18 ENCOUNTER — HOSPITAL ENCOUNTER (OUTPATIENT)
Age: 63
Setting detail: OBSERVATION
Discharge: HOME | End: 2023-08-19
Attending: FAMILY MEDICINE | Admitting: FAMILY MEDICINE
Payer: COMMERCIAL

## 2023-08-18 DIAGNOSIS — R07.9 CHEST PAIN, UNSPECIFIED TYPE: ICD-10-CM

## 2023-08-18 DIAGNOSIS — R74.01 TRANSAMINITIS: ICD-10-CM

## 2023-08-18 DIAGNOSIS — R07.9 ACUTE CHEST PAIN: Primary | ICD-10-CM

## 2023-08-18 DIAGNOSIS — N17.9 AKI (ACUTE KIDNEY INJURY) (HCC): ICD-10-CM

## 2023-08-18 DIAGNOSIS — E16.2 HYPOGLYCEMIA: ICD-10-CM

## 2023-08-18 DIAGNOSIS — E86.0 DEHYDRATION: ICD-10-CM

## 2023-08-18 DIAGNOSIS — J44.1 COPD EXACERBATION (HCC): ICD-10-CM

## 2023-08-18 LAB
ALBUMIN SERPL-MCNC: 4.9 G/DL (ref 3.5–5.2)
ALP SERPL-CCNC: 92 U/L (ref 40–129)
ALT SERPL-CCNC: 42 U/L (ref 0–40)
AMPHET UR QL SCN: NEGATIVE
ANION GAP SERPL CALCULATED.3IONS-SCNC: 22 MMOL/L (ref 7–16)
APAP SERPL-MCNC: <5 UG/ML (ref 10–30)
AST SERPL-CCNC: 66 U/L (ref 0–39)
B PARAP IS1001 DNA NPH QL NAA+NON-PROBE: NOT DETECTED
B PERT DNA SPEC QL NAA+PROBE: NOT DETECTED
BARBITURATES UR QL SCN: NEGATIVE
BASOPHILS # BLD: 0.05 K/UL (ref 0–0.2)
BASOPHILS NFR BLD: 1 % (ref 0–2)
BENZODIAZ UR QL: NEGATIVE
BILIRUB SERPL-MCNC: 0.5 MG/DL (ref 0–1.2)
BUN SERPL-MCNC: 18 MG/DL (ref 6–23)
BUPRENORPHINE UR QL: NEGATIVE
C PNEUM DNA NPH QL NAA+NON-PROBE: NOT DETECTED
CALCIUM SERPL-MCNC: 9.4 MG/DL (ref 8.6–10.2)
CANNABINOIDS UR QL SCN: NEGATIVE
CHLORIDE SERPL-SCNC: 92 MMOL/L (ref 98–107)
CO2 SERPL-SCNC: 17 MMOL/L (ref 22–29)
COCAINE UR QL SCN: NEGATIVE
CREAT SERPL-MCNC: 1.4 MG/DL (ref 0.7–1.2)
D DIMER: 386 NG/ML DDU (ref 0–232)
EOSINOPHIL # BLD: 0.05 K/UL (ref 0.05–0.5)
EOSINOPHILS RELATIVE PERCENT: 1 % (ref 0–6)
ERYTHROCYTE [DISTWIDTH] IN BLOOD BY AUTOMATED COUNT: 14.4 % (ref 11.5–15)
ETHANOLAMINE SERPL-MCNC: <10 MG/DL
FENTANYL UR QL: NEGATIVE
FLUAV RNA NPH QL NAA+NON-PROBE: NOT DETECTED
FLUBV RNA NPH QL NAA+NON-PROBE: NOT DETECTED
GFR SERPL CREATININE-BSD FRML MDRD: 59 ML/MIN/1.73M2
GLUCOSE BLD-MCNC: 172 MG/DL (ref 74–99)
GLUCOSE SERPL-MCNC: 51 MG/DL (ref 74–99)
HADV DNA NPH QL NAA+NON-PROBE: NOT DETECTED
HCOV 229E RNA NPH QL NAA+NON-PROBE: NOT DETECTED
HCOV HKU1 RNA NPH QL NAA+NON-PROBE: NOT DETECTED
HCOV NL63 RNA NPH QL NAA+NON-PROBE: NOT DETECTED
HCOV OC43 RNA NPH QL NAA+NON-PROBE: NOT DETECTED
HCT VFR BLD AUTO: 47 % (ref 37–54)
HGB BLD-MCNC: 16.1 G/DL (ref 12.5–16.5)
HMPV RNA NPH QL NAA+NON-PROBE: NOT DETECTED
HPIV1 RNA NPH QL NAA+NON-PROBE: NOT DETECTED
HPIV2 RNA NPH QL NAA+NON-PROBE: NOT DETECTED
HPIV3 RNA NPH QL NAA+NON-PROBE: NOT DETECTED
HPIV4 RNA NPH QL NAA+NON-PROBE: NOT DETECTED
IMM GRANULOCYTES # BLD AUTO: 0.08 K/UL (ref 0–0.58)
IMM GRANULOCYTES NFR BLD: 1 % (ref 0–5)
LYMPHOCYTES NFR BLD: 1.94 K/UL (ref 1.5–4)
LYMPHOCYTES RELATIVE PERCENT: 20 % (ref 20–42)
M PNEUMO DNA NPH QL NAA+NON-PROBE: NOT DETECTED
MCH RBC QN AUTO: 30.3 PG (ref 26–35)
MCHC RBC AUTO-ENTMCNC: 34.3 G/DL (ref 32–34.5)
MCV RBC AUTO: 88.5 FL (ref 80–99.9)
METHADONE UR QL: NEGATIVE
MONOCYTES NFR BLD: 0.94 K/UL (ref 0.1–0.95)
MONOCYTES NFR BLD: 10 % (ref 2–12)
NEUTROPHILS NFR BLD: 69 % (ref 43–80)
NEUTS SEG NFR BLD: 6.67 K/UL (ref 1.8–7.3)
OPIATES UR QL SCN: NEGATIVE
OXYCODONE UR QL SCN: NEGATIVE
PCP UR QL SCN: NEGATIVE
PLATELET # BLD AUTO: 251 K/UL (ref 130–450)
PMV BLD AUTO: 9.4 FL (ref 7–12)
POTASSIUM SERPL-SCNC: 3.8 MMOL/L (ref 3.5–5)
PROT SERPL-MCNC: 8.1 G/DL (ref 6.4–8.3)
RBC # BLD AUTO: 5.31 M/UL (ref 3.8–5.8)
RSV RNA NPH QL NAA+NON-PROBE: NOT DETECTED
RV+EV RNA NPH QL NAA+NON-PROBE: NOT DETECTED
SALICYLATES SERPL-MCNC: <0.3 MG/DL (ref 0–30)
SARS-COV-2 RNA NPH QL NAA+NON-PROBE: NOT DETECTED
SODIUM SERPL-SCNC: 131 MMOL/L (ref 132–146)
SPECIMEN DESCRIPTION: NORMAL
TEST INFORMATION: NORMAL
TOXIC TRICYCLIC SC,BLOOD: NEGATIVE
TROPONIN I SERPL HS-MCNC: 18 NG/L (ref 0–11)
TROPONIN I SERPL HS-MCNC: 20 NG/L (ref 0–11)
WBC OTHER # BLD: 9.7 K/UL (ref 4.5–11.5)

## 2023-08-18 PROCEDURE — 80307 DRUG TEST PRSMV CHEM ANLYZR: CPT

## 2023-08-18 PROCEDURE — 93005 ELECTROCARDIOGRAM TRACING: CPT | Performed by: STUDENT IN AN ORGANIZED HEALTH CARE EDUCATION/TRAINING PROGRAM

## 2023-08-18 PROCEDURE — 99285 EMERGENCY DEPT VISIT HI MDM: CPT

## 2023-08-18 PROCEDURE — 6360000002 HC RX W HCPCS: Performed by: STUDENT IN AN ORGANIZED HEALTH CARE EDUCATION/TRAINING PROGRAM

## 2023-08-18 PROCEDURE — 6360000004 HC RX CONTRAST MEDICATION: Performed by: RADIOLOGY

## 2023-08-18 PROCEDURE — G0378 HOSPITAL OBSERVATION PER HR: HCPCS

## 2023-08-18 PROCEDURE — 85379 FIBRIN DEGRADATION QUANT: CPT

## 2023-08-18 PROCEDURE — 84484 ASSAY OF TROPONIN QUANT: CPT

## 2023-08-18 PROCEDURE — 80053 COMPREHEN METABOLIC PANEL: CPT

## 2023-08-18 PROCEDURE — 71045 X-RAY EXAM CHEST 1 VIEW: CPT

## 2023-08-18 PROCEDURE — 0202U NFCT DS 22 TRGT SARS-COV-2: CPT

## 2023-08-18 PROCEDURE — 6370000000 HC RX 637 (ALT 250 FOR IP): Performed by: STUDENT IN AN ORGANIZED HEALTH CARE EDUCATION/TRAINING PROGRAM

## 2023-08-18 PROCEDURE — 94640 AIRWAY INHALATION TREATMENT: CPT

## 2023-08-18 PROCEDURE — 80179 DRUG ASSAY SALICYLATE: CPT

## 2023-08-18 PROCEDURE — 85025 COMPLETE CBC W/AUTO DIFF WBC: CPT

## 2023-08-18 PROCEDURE — 80143 DRUG ASSAY ACETAMINOPHEN: CPT

## 2023-08-18 PROCEDURE — 2580000003 HC RX 258: Performed by: STUDENT IN AN ORGANIZED HEALTH CARE EDUCATION/TRAINING PROGRAM

## 2023-08-18 PROCEDURE — 94664 DEMO&/EVAL PT USE INHALER: CPT

## 2023-08-18 PROCEDURE — 96361 HYDRATE IV INFUSION ADD-ON: CPT

## 2023-08-18 PROCEDURE — G0480 DRUG TEST DEF 1-7 CLASSES: HCPCS

## 2023-08-18 PROCEDURE — 71275 CT ANGIOGRAPHY CHEST: CPT

## 2023-08-18 PROCEDURE — 96374 THER/PROPH/DIAG INJ IV PUSH: CPT

## 2023-08-18 PROCEDURE — 82962 GLUCOSE BLOOD TEST: CPT

## 2023-08-18 RX ORDER — DEXTROSE MONOHYDRATE 100 MG/ML
INJECTION, SOLUTION INTRAVENOUS CONTINUOUS PRN
Status: DISCONTINUED | OUTPATIENT
Start: 2023-08-18 | End: 2023-08-19

## 2023-08-18 RX ORDER — IPRATROPIUM BROMIDE AND ALBUTEROL SULFATE 2.5; .5 MG/3ML; MG/3ML
1 SOLUTION RESPIRATORY (INHALATION)
Status: DISCONTINUED | OUTPATIENT
Start: 2023-08-18 | End: 2023-08-19

## 2023-08-18 RX ORDER — 0.9 % SODIUM CHLORIDE 0.9 %
1000 INTRAVENOUS SOLUTION INTRAVENOUS ONCE
Status: COMPLETED | OUTPATIENT
Start: 2023-08-18 | End: 2023-08-18

## 2023-08-18 RX ADMIN — SODIUM CHLORIDE 1000 ML: 9 INJECTION, SOLUTION INTRAVENOUS at 19:41

## 2023-08-18 RX ADMIN — WATER 125 MG: 1 INJECTION INTRAMUSCULAR; INTRAVENOUS; SUBCUTANEOUS at 17:43

## 2023-08-18 RX ADMIN — IOPAMIDOL 75 ML: 755 INJECTION, SOLUTION INTRAVENOUS at 20:51

## 2023-08-18 RX ADMIN — IPRATROPIUM BROMIDE AND ALBUTEROL SULFATE 1 DOSE: .5; 2.5 SOLUTION RESPIRATORY (INHALATION) at 23:14

## 2023-08-18 NOTE — ED PROVIDER NOTES
Name: Maryan Campos    MRN: [id number]     Date / Time Roomed:  2023 10:14 PM  ED Bed Assignment:  8209/8209-A    ------------------ History of Present Illness --------------------  23, Time: 5:18 PM EDT   Chief Complaint   Patient presents with    Chest Pain     Patient arrives to ED with midsternal CP x 3 days. Pain radiates into back. HPI    Maryan Campos is a 66 y.o. male, with hx of diabetes, hyperlipidemia, hypertension, COPD, chronic smoker, hypertension, who presents to the ED today for midsternal 6 out of 10 chest pain which been intermittent over the past 3 days. He relates shortness of breath with this and feeling like he cannot get a deep breath then. He states he tried his home nebulizer and inhaler treatment at this time help very much. Patient denies any relation of his chest pain with activity however states it seems to come and go. He does complain of some chest tightness as well. Denies any neck or arm pain. He does relate having some back pain. Denies any pain with deep breathing. Denies any cough or fevers or abdominal pain. Denies any paresthesias. Denies any recent illnesses. States he has not had chest pain like this before. The pt denies other ROS at this time. PCP: MARCELLA Mcdonald CNP.    -------------------- PMH --------------------    Past Medical History:   has a past medical history of Diabetes mellitus ([address]), Hyperlipidemia, and Hypertension.      Surgical History:  Past Surgical History:   Procedure Laterality Date    APPENDECTOMY      ARM SURGERY Left 10/15/2021    LEFT ELBOW INCISION AND DRAINAGE performed by Trent Eid MD at [address] [place] [address] DOUBLE  10/17/2021         HERNIA REPAIR   &        Social History:  Social History     Tobacco Use    Smoking status: Former     Types: Cigarettes     Quit date: 2019     Years since quittin.5    Smokeless tobacco: Never   Vaping Use    Vaping Use: Every day

## 2023-08-18 NOTE — ED NOTES
The following labs were labeled with appropriate pt sticker and tubed to lab:     [x] Blue     [x] Lavender   [] on ice  [x] Green/yellow  [] Green/black [] on ice  [] Francena Achilles  [] on ice  [] Yellow  [] Red  [] Type/ Screen  [] ABG  [] VBG    [x] COVID-19 swab    [] Rapid  [x] PCR  [] Flu swab  [] Peds Viral Panel     [] Urine Sample  [] Fecal Sample  [] Pelvic Cultures  [] Blood Cultures  [] X 2  [] STREP Cultures       Christiano French RN  08/18/23 5477

## 2023-08-18 NOTE — ED NOTES
Radiology Procedure Waiver   Name: Rehan Ochoa  : 1960  MRN: [id number]    Date:  23    Time: 7:34 PM EDT    Benefits of immediately proceeding with Radiology exam(s) without pre-testing outweigh the risks or are not indicated as specified below and therefore the following is/are being waived:    [] Pregnancy test   [] Patients LMP on-time and regular.   [] Patient had Tubal Ligation or has other Contraception Device. [] Patient  is Menopausal or Premenarcheal.    [] Patient had Full or Partial Hysterectomy. [] Protocol for Iodine allergy    [] MRI Questionnaire     [x] BUN/Creatinine   [] Patient age w/no hx of renal dysfunction. [] Patient on Dialysis. [] Recent Normal Labs.   Electronically signed by Olya Arizmendi DO on 23 at 7:34 PM EDT               Olya Arizmendi DO  Resident  23 [id number]

## 2023-08-19 ENCOUNTER — APPOINTMENT (OUTPATIENT)
Dept: NUCLEAR MEDICINE | Age: 63
End: 2023-08-19
Payer: COMMERCIAL

## 2023-08-19 VITALS
OXYGEN SATURATION: 98 % | BODY MASS INDEX: 30.62 KG/M2 | SYSTOLIC BLOOD PRESSURE: 139 MMHG | WEIGHT: 202 LBS | TEMPERATURE: 97.1 F | HEART RATE: 94 BPM | HEIGHT: 68 IN | DIASTOLIC BLOOD PRESSURE: 81 MMHG | RESPIRATION RATE: 18 BRPM

## 2023-08-19 LAB
ALBUMIN SERPL-MCNC: 4.3 G/DL (ref 3.5–5.2)
ALP SERPL-CCNC: 80 U/L (ref 40–129)
ALT SERPL-CCNC: 33 U/L (ref 0–40)
ANION GAP SERPL CALCULATED.3IONS-SCNC: 16 MMOL/L (ref 7–16)
AST SERPL-CCNC: 37 U/L (ref 0–39)
BASOPHILS # BLD: 0.01 K/UL (ref 0–0.2)
BASOPHILS NFR BLD: 0 % (ref 0–2)
BILIRUB SERPL-MCNC: 0.5 MG/DL (ref 0–1.2)
BUN SERPL-MCNC: 18 MG/DL (ref 6–23)
CALCIUM SERPL-MCNC: 8.6 MG/DL (ref 8.6–10.2)
CHLORIDE SERPL-SCNC: 96 MMOL/L (ref 98–107)
CO2 SERPL-SCNC: 19 MMOL/L (ref 22–29)
CREAT SERPL-MCNC: 0.9 MG/DL (ref 0.7–1.2)
EOSINOPHIL # BLD: 0 K/UL (ref 0.05–0.5)
EOSINOPHILS RELATIVE PERCENT: 0 % (ref 0–6)
ERYTHROCYTE [DISTWIDTH] IN BLOOD BY AUTOMATED COUNT: 14.2 % (ref 11.5–15)
GFR SERPL CREATININE-BSD FRML MDRD: >60 ML/MIN/1.73M2
GLUCOSE SERPL-MCNC: 190 MG/DL (ref 74–99)
HCT VFR BLD AUTO: 43.9 % (ref 37–54)
HGB BLD-MCNC: 14.6 G/DL (ref 12.5–16.5)
IMM GRANULOCYTES # BLD AUTO: 0.07 K/UL (ref 0–0.58)
IMM GRANULOCYTES NFR BLD: 1 % (ref 0–5)
LV EF: 75 %
LVEF MODALITY: NORMAL
LYMPHOCYTES NFR BLD: 0.61 K/UL (ref 1.5–4)
LYMPHOCYTES RELATIVE PERCENT: 11 % (ref 20–42)
MCH RBC QN AUTO: 29.6 PG (ref 26–35)
MCHC RBC AUTO-ENTMCNC: 33.3 G/DL (ref 32–34.5)
MCV RBC AUTO: 89 FL (ref 80–99.9)
MONOCYTES NFR BLD: 0.41 K/UL (ref 0.1–0.95)
MONOCYTES NFR BLD: 7 % (ref 2–12)
NEUTROPHILS NFR BLD: 81 % (ref 43–80)
NEUTS SEG NFR BLD: 4.72 K/UL (ref 1.8–7.3)
PLATELET # BLD AUTO: 214 K/UL (ref 130–450)
PMV BLD AUTO: 9.8 FL (ref 7–12)
POTASSIUM SERPL-SCNC: 4.3 MMOL/L (ref 3.5–5)
PROT SERPL-MCNC: 7.1 G/DL (ref 6.4–8.3)
RBC # BLD AUTO: 4.93 M/UL (ref 3.8–5.8)
SODIUM SERPL-SCNC: 131 MMOL/L (ref 132–146)
WBC OTHER # BLD: 5.8 K/UL (ref 4.5–11.5)

## 2023-08-19 PROCEDURE — 3430000000 HC RX DIAGNOSTIC RADIOPHARMACEUTICAL: Performed by: RADIOLOGY

## 2023-08-19 PROCEDURE — 80053 COMPREHEN METABOLIC PANEL: CPT

## 2023-08-19 PROCEDURE — 93018 CV STRESS TEST I&R ONLY: CPT | Performed by: INTERNAL MEDICINE

## 2023-08-19 PROCEDURE — 93016 CV STRESS TEST SUPVJ ONLY: CPT | Performed by: INTERNAL MEDICINE

## 2023-08-19 PROCEDURE — A9500 TC99M SESTAMIBI: HCPCS | Performed by: RADIOLOGY

## 2023-08-19 PROCEDURE — 93017 CV STRESS TEST TRACING ONLY: CPT

## 2023-08-19 PROCEDURE — G0378 HOSPITAL OBSERVATION PER HR: HCPCS

## 2023-08-19 PROCEDURE — 6370000000 HC RX 637 (ALT 250 FOR IP): Performed by: FAMILY MEDICINE

## 2023-08-19 PROCEDURE — 78452 HT MUSCLE IMAGE SPECT MULT: CPT | Performed by: INTERNAL MEDICINE

## 2023-08-19 PROCEDURE — 96361 HYDRATE IV INFUSION ADD-ON: CPT

## 2023-08-19 PROCEDURE — 36415 COLL VENOUS BLD VENIPUNCTURE: CPT

## 2023-08-19 PROCEDURE — 6360000002 HC RX W HCPCS: Performed by: FAMILY MEDICINE

## 2023-08-19 PROCEDURE — 6370000000 HC RX 637 (ALT 250 FOR IP): Performed by: NURSE PRACTITIONER

## 2023-08-19 PROCEDURE — 78452 HT MUSCLE IMAGE SPECT MULT: CPT

## 2023-08-19 PROCEDURE — 85025 COMPLETE CBC W/AUTO DIFF WBC: CPT

## 2023-08-19 PROCEDURE — 2580000003 HC RX 258: Performed by: FAMILY MEDICINE

## 2023-08-19 RX ORDER — LISINOPRIL 10 MG/1
5 TABLET ORAL DAILY
Status: DISCONTINUED | OUTPATIENT
Start: 2023-08-19 | End: 2023-08-19 | Stop reason: HOSPADM

## 2023-08-19 RX ORDER — TETRAKIS(2-METHOXYISOBUTYLISOCYANIDE)COPPER(I) TETRAFLUOROBORATE 1 MG/ML
13 INJECTION, POWDER, LYOPHILIZED, FOR SOLUTION INTRAVENOUS
Status: COMPLETED | OUTPATIENT
Start: 2023-08-19 | End: 2023-08-19

## 2023-08-19 RX ORDER — SODIUM CHLORIDE 9 MG/ML
INJECTION, SOLUTION INTRAVENOUS CONTINUOUS
Status: DISCONTINUED | OUTPATIENT
Start: 2023-08-19 | End: 2023-08-19

## 2023-08-19 RX ORDER — POLYETHYLENE GLYCOL 3350 17 G/17G
17 POWDER, FOR SOLUTION ORAL DAILY PRN
Status: DISCONTINUED | OUTPATIENT
Start: 2023-08-19 | End: 2023-08-19 | Stop reason: HOSPADM

## 2023-08-19 RX ORDER — NICOTINE 21 MG/24HR
1 PATCH, TRANSDERMAL 24 HOURS TRANSDERMAL DAILY
Status: DISCONTINUED | OUTPATIENT
Start: 2023-08-19 | End: 2023-08-19 | Stop reason: HOSPADM

## 2023-08-19 RX ORDER — SPIRONOLACTONE 25 MG/1
25 TABLET ORAL DAILY
Status: DISCONTINUED | OUTPATIENT
Start: 2023-08-19 | End: 2023-08-19 | Stop reason: HOSPADM

## 2023-08-19 RX ORDER — FOLIC ACID 1 MG/1
1 TABLET ORAL DAILY
Status: DISCONTINUED | OUTPATIENT
Start: 2023-08-19 | End: 2023-08-19 | Stop reason: HOSPADM

## 2023-08-19 RX ORDER — ACETAMINOPHEN 325 MG/1
650 TABLET ORAL EVERY 6 HOURS PRN
Status: DISCONTINUED | OUTPATIENT
Start: 2023-08-19 | End: 2023-08-19 | Stop reason: HOSPADM

## 2023-08-19 RX ORDER — ASPIRIN 81 MG/1
81 TABLET, CHEWABLE ORAL DAILY
Status: DISCONTINUED | OUTPATIENT
Start: 2023-08-19 | End: 2023-08-19 | Stop reason: HOSPADM

## 2023-08-19 RX ORDER — ACETAMINOPHEN 650 MG/1
650 SUPPOSITORY RECTAL EVERY 6 HOURS PRN
Status: DISCONTINUED | OUTPATIENT
Start: 2023-08-19 | End: 2023-08-19 | Stop reason: HOSPADM

## 2023-08-19 RX ORDER — PROMETHAZINE HYDROCHLORIDE 12.5 MG/1
12.5 TABLET ORAL EVERY 6 HOURS PRN
Status: DISCONTINUED | OUTPATIENT
Start: 2023-08-19 | End: 2023-08-19 | Stop reason: HOSPADM

## 2023-08-19 RX ORDER — SODIUM CHLORIDE 9 MG/ML
INJECTION, SOLUTION INTRAVENOUS PRN
Status: DISCONTINUED | OUTPATIENT
Start: 2023-08-19 | End: 2023-08-19 | Stop reason: HOSPADM

## 2023-08-19 RX ORDER — REGADENOSON 0.08 MG/ML
0.4 INJECTION, SOLUTION INTRAVENOUS
Status: COMPLETED | OUTPATIENT
Start: 2023-08-19 | End: 2023-08-19

## 2023-08-19 RX ORDER — ENOXAPARIN SODIUM 100 MG/ML
40 INJECTION SUBCUTANEOUS DAILY
Status: DISCONTINUED | OUTPATIENT
Start: 2023-08-19 | End: 2023-08-19 | Stop reason: HOSPADM

## 2023-08-19 RX ORDER — SODIUM CHLORIDE 0.9 % (FLUSH) 0.9 %
10 SYRINGE (ML) INJECTION EVERY 12 HOURS SCHEDULED
Status: DISCONTINUED | OUTPATIENT
Start: 2023-08-19 | End: 2023-08-19 | Stop reason: HOSPADM

## 2023-08-19 RX ORDER — MONTELUKAST SODIUM 10 MG/1
10 TABLET ORAL NIGHTLY
Status: DISCONTINUED | OUTPATIENT
Start: 2023-08-19 | End: 2023-08-19 | Stop reason: HOSPADM

## 2023-08-19 RX ORDER — ONDANSETRON 2 MG/ML
4 INJECTION INTRAMUSCULAR; INTRAVENOUS EVERY 6 HOURS PRN
Status: DISCONTINUED | OUTPATIENT
Start: 2023-08-19 | End: 2023-08-19 | Stop reason: HOSPADM

## 2023-08-19 RX ORDER — PANTOPRAZOLE SODIUM 40 MG/1
40 TABLET, DELAYED RELEASE ORAL DAILY
Status: DISCONTINUED | OUTPATIENT
Start: 2023-08-19 | End: 2023-08-19

## 2023-08-19 RX ORDER — FAMOTIDINE 20 MG/1
20 TABLET, FILM COATED ORAL 2 TIMES DAILY
Status: DISCONTINUED | OUTPATIENT
Start: 2023-08-19 | End: 2023-08-19 | Stop reason: HOSPADM

## 2023-08-19 RX ORDER — TETRAKIS(2-METHOXYISOBUTYLISOCYANIDE)COPPER(I) TETRAFLUOROBORATE 1 MG/ML
30 INJECTION, POWDER, LYOPHILIZED, FOR SOLUTION INTRAVENOUS
Status: COMPLETED | OUTPATIENT
Start: 2023-08-19 | End: 2023-08-19

## 2023-08-19 RX ORDER — SODIUM CHLORIDE 0.9 % (FLUSH) 0.9 %
10 SYRINGE (ML) INJECTION PRN
Status: DISCONTINUED | OUTPATIENT
Start: 2023-08-19 | End: 2023-08-19 | Stop reason: HOSPADM

## 2023-08-19 RX ADMIN — SODIUM CHLORIDE: 9 INJECTION, SOLUTION INTRAVENOUS at 03:24

## 2023-08-19 RX ADMIN — REGADENOSON 0.4 MG: 0.08 INJECTION, SOLUTION INTRAVENOUS at 09:48

## 2023-08-19 RX ADMIN — ASPIRIN 81 MG 81 MG: 81 TABLET ORAL at 12:24

## 2023-08-19 RX ADMIN — METOPROLOL TARTRATE 25 MG: 25 TABLET, FILM COATED ORAL at 12:30

## 2023-08-19 RX ADMIN — SPIRONOLACTONE 25 MG: 25 TABLET ORAL at 12:23

## 2023-08-19 RX ADMIN — Medication 13 MILLICURIE: at 08:35

## 2023-08-19 RX ADMIN — FAMOTIDINE 20 MG: 20 TABLET, FILM COATED ORAL at 12:24

## 2023-08-19 RX ADMIN — LISINOPRIL 5 MG: 10 TABLET ORAL at 12:30

## 2023-08-19 RX ADMIN — SODIUM CHLORIDE, PRESERVATIVE FREE 10 ML: 5 INJECTION INTRAVENOUS at 12:28

## 2023-08-19 RX ADMIN — FOLIC ACID 1 MG: 1 TABLET ORAL at 12:24

## 2023-08-19 RX ADMIN — Medication 37 MILLICURIE: at 09:55

## 2023-08-19 NOTE — CARE COORDINATION
8/19/2023social work transition of care planning  Discharge order noted. Sw spoke with Rn,no needs.   Electronically signed by HI Reed on 8/19/2023 at 2:34 PM

## 2023-08-19 NOTE — PROGRESS NOTES
4 Eyes Skin Assessment     NAME:  Heather Nelson  YOB: 1960  MEDICAL RECORD NUMBER:  [id number]    The patient is being assessed for  Admission    I agree that at least one RN has performed a thorough Head to Toe Skin Assessment on the patient. ALL assessment sites listed below have been assessed. Areas assessed by both nurses:    Head, Face, Ears, Shoulders, Back, Chest, Arms, Elbows, Hands, Sacrum. Buttock, Coccyx, Ischium, Legs. Feet and Heels, Under Medical Devices , and Other yes        Does the Patient have a Wound?  No noted wound(s)       Demetris Prevention initiated by RN: Yes  Wound Care Orders initiated by RN: No    Pressure Injury (Stage 3,4, Unstageable, DTI, NWPT, and Complex wounds) if present, place Wound referral order by RN under : No    New Ostomies, if present place, Ostomy referral order under : No     Nurse 1 eSignature: Electronically signed by Deisi Starr RN on 8/19/23 at 7:28 AM EDT    **SHARE this note so that the co-signing nurse can place an eSignature**    Nurse 2 eSignature: Electronically signed by Tello Castro RN on 8/19/23 at 7:45 AM EDT

## 2023-08-19 NOTE — PLAN OF CARE
Problem: Chronic Conditions and Co-morbidities  Goal: Patient's chronic conditions and co-morbidity symptoms are monitored and maintained or improved  Outcome: Adequate for Discharge     Problem: ABCDS Injury Assessment  Goal: Absence of physical injury  Outcome: Adequate for Discharge     Problem: Chronic Conditions and Co-morbidities  Goal: Patient's chronic conditions and co-morbidity symptoms are monitored and maintained or improved  Outcome: Adequate for Discharge     Problem: ABCDS Injury Assessment  Goal: Absence of physical injury  Outcome: Adequate for Discharge

## 2023-08-19 NOTE — PROCEDURES
Lexiscan Stress EKG Report:    Dx CP    Baseline EKG: normal sinus rhythm, nonspecific ST and T waves changes. Stress EKG: No ST-T changes. Arrhythmias: None. Symptoms: None. Summary:  Unremarkable lexiscan stress EKG. See separate report for stress perfusion results. Graham Moreno D.O.   Cardiologist  Cardiology, [address] [facility]

## 2023-08-19 NOTE — H&P
will be dictating this exam?->CRC FINDINGS: Pulmonary Arteries: Pulmonary arteries are adequately opacified for evaluation. No evidence of intraluminal filling defect to suggest pulmonary embolism. Main pulmonary artery is normal in caliber. Mediastinum: No evidence of mediastinal lymphadenopathy. The heart and pericardium demonstrate no acute abnormality. There is no acute abnormality of the thoracic aorta. Lungs/pleura: The lungs are without acute process. No focal consolidation or pulmonary edema. No evidence of pleural effusion or pneumothorax. Upper Abdomen: Limited images of the upper abdomen reveal hepatomegaly with diffuse low attenuation of hepatic steatosis. Left intrarenal stone of nonobstructing left nephrolithiasis. Soft Tissues/Bones: No acute osseous findings with multiple chronic right rib deformities. No evidence of pulmonary embolism or acute pulmonary abnormality. Limited images of the upper abdomen reveal hepatomegaly with diffuse low attenuation of hepatic steatosis. Left intrarenal stone of nonobstructing left nephrolithiasis. ASSESSMENT:  -Chest pain  -Hypertension  -Hyperlipidemia  -Diabetes mellitus      PLAN:  -Admit to medicine  -Telemetry  -Nuclear stress with exercise  -Aspirin, atorvastatin, Lovenox  -Continue home medications        Diet: No diet orders on file  Code Status: Prior  Surrogate decision maker confirmed with patient:   Extended Emergency Contact Information  Primary Emergency Contact: eleno walters  [place] Phone: [phone]  Mobile Phone: [phone]  Relation: Brother/Sister    DVT Prophylaxis: []Lovenox []Heparin []PCD [] [address] []Encouraged ambulation  Disposition: []Med/Surg [] Intermediate [] ICU/CCU  Admit status: [] Observation [] Inpatient     +++++++++++++++++++++++++++++++++++++++++++++++++  Yue Colvin,   +++++++++++++++++++++++++++++++++++++++++++++++++  NOTE: This report was transcribed using voice recognition software.  Every effort was made to ensure accuracy; however, inadvertent computerized transcription errors may be present.

## 2023-08-20 NOTE — DISCHARGE SUMMARY
silhouette is without acute process. The osseous structures are without acute process. There are old rib fractures. No acute process. CTA PULMONARY W CONTRAST    Result Date: 8/18/2023  EXAMINATION: CTA OF THE CHEST 8/18/2023 8:49 pm TECHNIQUE: CTA of the chest was performed after the administration of intravenous contrast.  Multiplanar reformatted images are provided for review. 3D and MIP images are provided for review. Automated exposure control, iterative reconstruction, and/or weight based adjustment of the mA/kV was utilized to reduce the radiation dose to as low as reasonably achievable. COMPARISON: Chest x-ray 2 hours prior HISTORY: ORDERING SYSTEM PROVIDED HISTORY: cp, sob. elev dimer TECHNOLOGIST PROVIDED HISTORY: Reason for exam:->cp, sob. elev dimer Decision Support Exception - unselect if not a suspected or confirmed emergency medical condition->Emergency Medical Condition (MA) What reading provider will be dictating this exam?->CRC FINDINGS: Pulmonary Arteries: Pulmonary arteries are adequately opacified for evaluation. No evidence of intraluminal filling defect to suggest pulmonary embolism. Main pulmonary artery is normal in caliber. Mediastinum: No evidence of mediastinal lymphadenopathy. The heart and pericardium demonstrate no acute abnormality. There is no acute abnormality of the thoracic aorta. Lungs/pleura: The lungs are without acute process. No focal consolidation or pulmonary edema. No evidence of pleural effusion or pneumothorax. Upper Abdomen: Limited images of the upper abdomen reveal hepatomegaly with diffuse low attenuation of hepatic steatosis. Left intrarenal stone of nonobstructing left nephrolithiasis. Soft Tissues/Bones: No acute osseous findings with multiple chronic right rib deformities. No evidence of pulmonary embolism or acute pulmonary abnormality. Limited images of the upper abdomen reveal hepatomegaly with diffuse low attenuation of hepatic steatosis.

## 2023-08-21 LAB
EKG ATRIAL RATE: 103 BPM
EKG P AXIS: 55 DEGREES
EKG P-R INTERVAL: 144 MS
EKG Q-T INTERVAL: 344 MS
EKG QRS DURATION: 94 MS
EKG QTC CALCULATION (BAZETT): 450 MS
EKG R AXIS: -76 DEGREES
EKG T AXIS: 59 DEGREES
EKG VENTRICULAR RATE: 103 BPM

## 2023-08-21 PROCEDURE — 93010 ELECTROCARDIOGRAM REPORT: CPT | Performed by: INTERNAL MEDICINE

## 2023-12-09 ENCOUNTER — HOSPITAL ENCOUNTER (INPATIENT)
Age: 63
LOS: 2 days | Discharge: HOME | End: 2023-12-11
Attending: EMERGENCY MEDICINE | Admitting: INTERNAL MEDICINE
Payer: COMMERCIAL

## 2023-12-09 ENCOUNTER — APPOINTMENT (OUTPATIENT)
Dept: CT IMAGING | Age: 63
End: 2023-12-09
Payer: COMMERCIAL

## 2023-12-09 ENCOUNTER — APPOINTMENT (OUTPATIENT)
Dept: GENERAL RADIOLOGY | Age: 63
End: 2023-12-09
Payer: COMMERCIAL

## 2023-12-09 DIAGNOSIS — M54.6 ACUTE RIGHT-SIDED THORACIC BACK PAIN: ICD-10-CM

## 2023-12-09 DIAGNOSIS — F10.930 ALCOHOL WITHDRAWAL SYNDROME WITHOUT COMPLICATION (HCC): ICD-10-CM

## 2023-12-09 DIAGNOSIS — R07.9 ACUTE CHEST PAIN: Primary | ICD-10-CM

## 2023-12-09 DIAGNOSIS — R25.1 TREMULOUSNESS: ICD-10-CM

## 2023-12-09 DIAGNOSIS — R79.89 ELEVATED TROPONIN: ICD-10-CM

## 2023-12-09 PROBLEM — F10.10 ALCOHOL ABUSE: Status: ACTIVE | Noted: 2023-12-09

## 2023-12-09 PROBLEM — F10.931 ACUTE HYPERACTIVE ALCOHOL WITHDRAWAL DELIRIUM (HCC): Status: ACTIVE | Noted: 2023-12-09

## 2023-12-09 PROBLEM — K70.10 ALCOHOLIC HEPATITIS WITHOUT ASCITES: Status: ACTIVE | Noted: 2023-12-09

## 2023-12-09 LAB
ALBUMIN SERPL-MCNC: 4.5 G/DL (ref 3.5–5.2)
ALP SERPL-CCNC: 127 U/L (ref 40–129)
ALT SERPL-CCNC: 86 U/L (ref 0–40)
ANION GAP SERPL CALCULATED.3IONS-SCNC: 18 MMOL/L (ref 7–16)
AST SERPL-CCNC: 122 U/L (ref 0–39)
BASOPHILS # BLD: 0.01 K/UL (ref 0–0.2)
BASOPHILS NFR BLD: 0 % (ref 0–2)
BILIRUB SERPL-MCNC: 0.4 MG/DL (ref 0–1.2)
BILIRUB UR QL STRIP: NEGATIVE
BUN SERPL-MCNC: 8 MG/DL (ref 6–23)
CALCIUM SERPL-MCNC: 9 MG/DL (ref 8.6–10.2)
CHLORIDE SERPL-SCNC: 101 MMOL/L (ref 98–107)
CHOLEST SERPL-MCNC: 170 MG/DL
CK SERPL-CCNC: 463 U/L (ref 20–200)
CLARITY UR: CLEAR
CO2 SERPL-SCNC: 21 MMOL/L (ref 22–29)
COLOR UR: YELLOW
CREAT SERPL-MCNC: 0.6 MG/DL (ref 0.7–1.2)
EOSINOPHIL # BLD: 0.01 K/UL (ref 0.05–0.5)
EOSINOPHILS RELATIVE PERCENT: 0 % (ref 0–6)
ERYTHROCYTE [DISTWIDTH] IN BLOOD BY AUTOMATED COUNT: 13.4 % (ref 11.5–15)
ETHANOLAMINE SERPL-MCNC: <10 MG/DL
GFR SERPL CREATININE-BSD FRML MDRD: >60 ML/MIN/1.73M2
GLUCOSE BLD-MCNC: 124 MG/DL (ref 74–99)
GLUCOSE BLD-MCNC: 95 MG/DL (ref 74–99)
GLUCOSE SERPL-MCNC: 80 MG/DL (ref 74–99)
GLUCOSE UR STRIP-MCNC: NEGATIVE MG/DL
HBA1C MFR BLD: 5.8 % (ref 4–5.6)
HCT VFR BLD AUTO: 42.9 % (ref 37–54)
HDLC SERPL-MCNC: 105 MG/DL
HGB BLD-MCNC: 14.6 G/DL (ref 12.5–16.5)
HGB UR QL STRIP.AUTO: ABNORMAL
IMM GRANULOCYTES # BLD AUTO: 0.05 K/UL (ref 0–0.58)
IMM GRANULOCYTES NFR BLD: 1 % (ref 0–5)
INFLUENZA A BY PCR: NOT DETECTED
INFLUENZA B BY PCR: NOT DETECTED
KETONES UR STRIP-MCNC: 15 MG/DL
LACTATE BLDV-SCNC: 0.8 MMOL/L (ref 0.5–2.2)
LACTATE BLDV-SCNC: 3 MMOL/L (ref 0.5–2.2)
LDLC SERPL CALC-MCNC: 52 MG/DL
LEUKOCYTE ESTERASE UR QL STRIP: NEGATIVE
LYMPHOCYTES NFR BLD: 0.84 K/UL (ref 1.5–4)
LYMPHOCYTES RELATIVE PERCENT: 8 % (ref 20–42)
MCH RBC QN AUTO: 31.7 PG (ref 26–35)
MCHC RBC AUTO-ENTMCNC: 34 G/DL (ref 32–34.5)
MCV RBC AUTO: 93.1 FL (ref 80–99.9)
MONOCYTES NFR BLD: 0.51 K/UL (ref 0.1–0.95)
MONOCYTES NFR BLD: 5 % (ref 2–12)
NEUTROPHILS NFR BLD: 86 % (ref 43–80)
NEUTS SEG NFR BLD: 8.71 K/UL (ref 1.8–7.3)
NITRITE UR QL STRIP: NEGATIVE
PH UR STRIP: 5.5 [PH] (ref 5–9)
PLATELET # BLD AUTO: 176 K/UL (ref 130–450)
PMV BLD AUTO: 9.1 FL (ref 7–12)
POTASSIUM SERPL-SCNC: 4 MMOL/L (ref 3.5–5)
PROT SERPL-MCNC: 7.3 G/DL (ref 6.4–8.3)
PROT UR STRIP-MCNC: 30 MG/DL
RBC # BLD AUTO: 4.61 M/UL (ref 3.8–5.8)
RBC #/AREA URNS HPF: ABNORMAL /HPF
SARS-COV-2 RDRP RESP QL NAA+PROBE: NOT DETECTED
SODIUM SERPL-SCNC: 140 MMOL/L (ref 132–146)
SP GR UR STRIP: 1.02 (ref 1–1.03)
SPECIMEN DESCRIPTION: NORMAL
T4 FREE SERPL-MCNC: 1 NG/DL (ref 0.9–1.7)
TRIGL SERPL-MCNC: 67 MG/DL
TROPONIN I SERPL HS-MCNC: 108 NG/L (ref 0–11)
TROPONIN I SERPL HS-MCNC: 94 NG/L (ref 0–11)
TROPONIN I SERPL HS-MCNC: 98 NG/L (ref 0–11)
TSH SERPL DL<=0.05 MIU/L-ACNC: 0.27 UIU/ML (ref 0.27–4.2)
TSH SERPL DL<=0.05 MIU/L-ACNC: 0.32 UIU/ML (ref 0.27–4.2)
UROBILINOGEN UR STRIP-ACNC: 0.2 EU/DL (ref 0–1)
VLDLC SERPL CALC-MCNC: 13 MG/DL
WBC #/AREA URNS HPF: ABNORMAL /HPF
WBC OTHER # BLD: 10.1 K/UL (ref 4.5–11.5)

## 2023-12-09 PROCEDURE — 6360000004 HC RX CONTRAST MEDICATION: Performed by: RADIOLOGY

## 2023-12-09 PROCEDURE — G0480 DRUG TEST DEF 1-7 CLASSES: HCPCS

## 2023-12-09 PROCEDURE — 99223 1ST HOSP IP/OBS HIGH 75: CPT | Performed by: INTERNAL MEDICINE

## 2023-12-09 PROCEDURE — 87502 INFLUENZA DNA AMP PROBE: CPT

## 2023-12-09 PROCEDURE — 2580000003 HC RX 258

## 2023-12-09 PROCEDURE — 99285 EMERGENCY DEPT VISIT HI MDM: CPT

## 2023-12-09 PROCEDURE — 80053 COMPREHEN METABOLIC PANEL: CPT

## 2023-12-09 PROCEDURE — 87635 SARS-COV-2 COVID-19 AMP PRB: CPT

## 2023-12-09 PROCEDURE — 71045 X-RAY EXAM CHEST 1 VIEW: CPT

## 2023-12-09 PROCEDURE — 84439 ASSAY OF FREE THYROXINE: CPT

## 2023-12-09 PROCEDURE — 84443 ASSAY THYROID STIM HORMONE: CPT

## 2023-12-09 PROCEDURE — 83605 ASSAY OF LACTIC ACID: CPT

## 2023-12-09 PROCEDURE — 71275 CT ANGIOGRAPHY CHEST: CPT

## 2023-12-09 PROCEDURE — APPSS180 APP SPLIT SHARED TIME > 60 MINUTES: Performed by: NURSE PRACTITIONER

## 2023-12-09 PROCEDURE — 6370000000 HC RX 637 (ALT 250 FOR IP): Performed by: INTERNAL MEDICINE

## 2023-12-09 PROCEDURE — 6360000002 HC RX W HCPCS: Performed by: INTERNAL MEDICINE

## 2023-12-09 PROCEDURE — 80061 LIPID PANEL: CPT

## 2023-12-09 PROCEDURE — 85025 COMPLETE CBC W/AUTO DIFF WBC: CPT

## 2023-12-09 PROCEDURE — 82550 ASSAY OF CK (CPK): CPT

## 2023-12-09 PROCEDURE — 6370000000 HC RX 637 (ALT 250 FOR IP)

## 2023-12-09 PROCEDURE — 93005 ELECTROCARDIOGRAM TRACING: CPT

## 2023-12-09 PROCEDURE — 83036 HEMOGLOBIN GLYCOSYLATED A1C: CPT

## 2023-12-09 PROCEDURE — 1200000000 HC SEMI PRIVATE

## 2023-12-09 PROCEDURE — 81001 URINALYSIS AUTO W/SCOPE: CPT

## 2023-12-09 PROCEDURE — 84484 ASSAY OF TROPONIN QUANT: CPT

## 2023-12-09 PROCEDURE — 82962 GLUCOSE BLOOD TEST: CPT

## 2023-12-09 RX ORDER — LORAZEPAM 1 MG/1
3 TABLET ORAL
Status: DISCONTINUED | OUTPATIENT
Start: 2023-12-09 | End: 2023-12-09

## 2023-12-09 RX ORDER — FOLIC ACID 1 MG/1
1 TABLET ORAL DAILY
Status: DISCONTINUED | OUTPATIENT
Start: 2023-12-09 | End: 2023-12-09

## 2023-12-09 RX ORDER — LORAZEPAM 1 MG/1
4 TABLET ORAL
Status: DISCONTINUED | OUTPATIENT
Start: 2023-12-09 | End: 2023-12-09

## 2023-12-09 RX ORDER — LORAZEPAM 1 MG/1
4 TABLET ORAL
Status: DISCONTINUED | OUTPATIENT
Start: 2023-12-09 | End: 2023-12-11 | Stop reason: HOSPADM

## 2023-12-09 RX ORDER — SODIUM CHLORIDE 0.9 % (FLUSH) 0.9 %
5-40 SYRINGE (ML) INJECTION PRN
Status: DISCONTINUED | OUTPATIENT
Start: 2023-12-09 | End: 2023-12-11 | Stop reason: HOSPADM

## 2023-12-09 RX ORDER — LORAZEPAM 2 MG/ML
1 INJECTION INTRAMUSCULAR
Status: DISCONTINUED | OUTPATIENT
Start: 2023-12-09 | End: 2023-12-09

## 2023-12-09 RX ORDER — SPIRONOLACTONE 25 MG/1
25 TABLET ORAL DAILY
Status: DISCONTINUED | OUTPATIENT
Start: 2023-12-09 | End: 2023-12-11 | Stop reason: HOSPADM

## 2023-12-09 RX ORDER — POTASSIUM CHLORIDE 7.45 MG/ML
10 INJECTION INTRAVENOUS PRN
Status: DISCONTINUED | OUTPATIENT
Start: 2023-12-09 | End: 2023-12-11 | Stop reason: HOSPADM

## 2023-12-09 RX ORDER — LORAZEPAM 2 MG/ML
4 INJECTION INTRAMUSCULAR
Status: DISCONTINUED | OUTPATIENT
Start: 2023-12-09 | End: 2023-12-09 | Stop reason: RX

## 2023-12-09 RX ORDER — LORAZEPAM 1 MG/1
1 TABLET ORAL
Status: DISCONTINUED | OUTPATIENT
Start: 2023-12-09 | End: 2023-12-11 | Stop reason: HOSPADM

## 2023-12-09 RX ORDER — SODIUM CHLORIDE 0.9 % (FLUSH) 0.9 %
5-40 SYRINGE (ML) INJECTION PRN
Status: DISCONTINUED | OUTPATIENT
Start: 2023-12-09 | End: 2023-12-09 | Stop reason: SDUPTHER

## 2023-12-09 RX ORDER — ACETAMINOPHEN 650 MG/1
650 SUPPOSITORY RECTAL EVERY 6 HOURS PRN
Status: DISCONTINUED | OUTPATIENT
Start: 2023-12-09 | End: 2023-12-11 | Stop reason: HOSPADM

## 2023-12-09 RX ORDER — LANOLIN ALCOHOL/MO/W.PET/CERES
100 CREAM (GRAM) TOPICAL DAILY
Status: DISCONTINUED | OUTPATIENT
Start: 2023-12-09 | End: 2023-12-09

## 2023-12-09 RX ORDER — ROSUVASTATIN CALCIUM 40 MG/1
40 TABLET, COATED ORAL DAILY
Status: ON HOLD | COMMUNITY
Start: 2023-10-24 | End: 2023-12-11 | Stop reason: SDUPTHER

## 2023-12-09 RX ORDER — MULTIVITAMIN WITH IRON
1 TABLET ORAL DAILY
Status: DISCONTINUED | OUTPATIENT
Start: 2023-12-09 | End: 2023-12-11 | Stop reason: HOSPADM

## 2023-12-09 RX ORDER — LORAZEPAM 1 MG/1
3 TABLET ORAL
Status: DISCONTINUED | OUTPATIENT
Start: 2023-12-09 | End: 2023-12-11 | Stop reason: HOSPADM

## 2023-12-09 RX ORDER — TIZANIDINE 4 MG/1
2 TABLET ORAL EVERY 8 HOURS PRN
Status: DISCONTINUED | OUTPATIENT
Start: 2023-12-09 | End: 2023-12-11 | Stop reason: HOSPADM

## 2023-12-09 RX ORDER — SODIUM CHLORIDE 0.9 % (FLUSH) 0.9 %
5-40 SYRINGE (ML) INJECTION EVERY 12 HOURS SCHEDULED
Status: DISCONTINUED | OUTPATIENT
Start: 2023-12-09 | End: 2023-12-09 | Stop reason: SDUPTHER

## 2023-12-09 RX ORDER — LORAZEPAM 2 MG/ML
3 INJECTION INTRAMUSCULAR
Status: DISCONTINUED | OUTPATIENT
Start: 2023-12-09 | End: 2023-12-09

## 2023-12-09 RX ORDER — SODIUM CHLORIDE 9 MG/ML
INJECTION, SOLUTION INTRAVENOUS PRN
Status: DISCONTINUED | OUTPATIENT
Start: 2023-12-09 | End: 2023-12-11 | Stop reason: HOSPADM

## 2023-12-09 RX ORDER — LANOLIN ALCOHOL/MO/W.PET/CERES
100 CREAM (GRAM) TOPICAL DAILY
Status: DISCONTINUED | OUTPATIENT
Start: 2023-12-09 | End: 2023-12-11 | Stop reason: HOSPADM

## 2023-12-09 RX ORDER — 0.9 % SODIUM CHLORIDE 0.9 %
1000 INTRAVENOUS SOLUTION INTRAVENOUS ONCE
Status: DISCONTINUED | OUTPATIENT
Start: 2023-12-09 | End: 2023-12-09

## 2023-12-09 RX ORDER — FOLIC ACID 1 MG/1
1 TABLET ORAL DAILY
Status: DISCONTINUED | OUTPATIENT
Start: 2023-12-09 | End: 2023-12-11 | Stop reason: HOSPADM

## 2023-12-09 RX ORDER — LORAZEPAM 2 MG/ML
2 INJECTION INTRAMUSCULAR
Status: DISCONTINUED | OUTPATIENT
Start: 2023-12-09 | End: 2023-12-09 | Stop reason: RX

## 2023-12-09 RX ORDER — ONDANSETRON 4 MG/1
4 TABLET, ORALLY DISINTEGRATING ORAL EVERY 8 HOURS PRN
Status: DISCONTINUED | OUTPATIENT
Start: 2023-12-09 | End: 2023-12-11 | Stop reason: HOSPADM

## 2023-12-09 RX ORDER — LORAZEPAM 1 MG/1
2 TABLET ORAL
Status: DISCONTINUED | OUTPATIENT
Start: 2023-12-09 | End: 2023-12-11 | Stop reason: HOSPADM

## 2023-12-09 RX ORDER — POTASSIUM CHLORIDE 20 MEQ/1
40 TABLET, EXTENDED RELEASE ORAL PRN
Status: DISCONTINUED | OUTPATIENT
Start: 2023-12-09 | End: 2023-12-11 | Stop reason: HOSPADM

## 2023-12-09 RX ORDER — POLYETHYLENE GLYCOL 3350 17 G/17G
17 POWDER, FOR SOLUTION ORAL DAILY PRN
Status: DISCONTINUED | OUTPATIENT
Start: 2023-12-09 | End: 2023-12-11 | Stop reason: HOSPADM

## 2023-12-09 RX ORDER — ACETAMINOPHEN 325 MG/1
650 TABLET ORAL EVERY 6 HOURS PRN
Status: DISCONTINUED | OUTPATIENT
Start: 2023-12-09 | End: 2023-12-11 | Stop reason: HOSPADM

## 2023-12-09 RX ORDER — LORAZEPAM 2 MG/ML
1 INJECTION INTRAMUSCULAR
Status: DISCONTINUED | OUTPATIENT
Start: 2023-12-09 | End: 2023-12-09 | Stop reason: RX

## 2023-12-09 RX ORDER — MAGNESIUM SULFATE IN WATER 40 MG/ML
2000 INJECTION, SOLUTION INTRAVENOUS PRN
Status: DISCONTINUED | OUTPATIENT
Start: 2023-12-09 | End: 2023-12-11 | Stop reason: HOSPADM

## 2023-12-09 RX ORDER — ONDANSETRON 2 MG/ML
4 INJECTION INTRAMUSCULAR; INTRAVENOUS EVERY 6 HOURS PRN
Status: DISCONTINUED | OUTPATIENT
Start: 2023-12-09 | End: 2023-12-11 | Stop reason: HOSPADM

## 2023-12-09 RX ORDER — SODIUM CHLORIDE 0.9 % (FLUSH) 0.9 %
5-40 SYRINGE (ML) INJECTION EVERY 12 HOURS SCHEDULED
Status: DISCONTINUED | OUTPATIENT
Start: 2023-12-09 | End: 2023-12-11 | Stop reason: HOSPADM

## 2023-12-09 RX ORDER — ENOXAPARIN SODIUM 100 MG/ML
40 INJECTION SUBCUTANEOUS DAILY
Status: DISCONTINUED | OUTPATIENT
Start: 2023-12-09 | End: 2023-12-11 | Stop reason: HOSPADM

## 2023-12-09 RX ORDER — SERTRALINE HYDROCHLORIDE 100 MG/1
100 TABLET, FILM COATED ORAL NIGHTLY
Status: DISCONTINUED | OUTPATIENT
Start: 2023-12-09 | End: 2023-12-11 | Stop reason: HOSPADM

## 2023-12-09 RX ORDER — GLIPIZIDE 5 MG/1
10 TABLET ORAL
Status: DISCONTINUED | OUTPATIENT
Start: 2023-12-09 | End: 2023-12-11 | Stop reason: HOSPADM

## 2023-12-09 RX ORDER — SODIUM CHLORIDE 9 MG/ML
INJECTION, SOLUTION INTRAVENOUS PRN
Status: DISCONTINUED | OUTPATIENT
Start: 2023-12-09 | End: 2023-12-09 | Stop reason: SDUPTHER

## 2023-12-09 RX ORDER — LORAZEPAM 1 MG/1
2 TABLET ORAL
Status: DISCONTINUED | OUTPATIENT
Start: 2023-12-09 | End: 2023-12-09

## 2023-12-09 RX ORDER — 0.9 % SODIUM CHLORIDE 0.9 %
1000 INTRAVENOUS SOLUTION INTRAVENOUS ONCE
Status: COMPLETED | OUTPATIENT
Start: 2023-12-09 | End: 2023-12-09

## 2023-12-09 RX ORDER — LORAZEPAM 2 MG/ML
3 INJECTION INTRAMUSCULAR
Status: DISCONTINUED | OUTPATIENT
Start: 2023-12-09 | End: 2023-12-09 | Stop reason: RX

## 2023-12-09 RX ORDER — PANTOPRAZOLE SODIUM 40 MG/1
40 TABLET, DELAYED RELEASE ORAL
Status: DISCONTINUED | OUTPATIENT
Start: 2023-12-10 | End: 2023-12-11 | Stop reason: HOSPADM

## 2023-12-09 RX ORDER — LORAZEPAM 1 MG/1
1 TABLET ORAL
Status: DISCONTINUED | OUTPATIENT
Start: 2023-12-09 | End: 2023-12-09

## 2023-12-09 RX ORDER — LORAZEPAM 2 MG/ML
4 INJECTION INTRAMUSCULAR
Status: DISCONTINUED | OUTPATIENT
Start: 2023-12-09 | End: 2023-12-09

## 2023-12-09 RX ORDER — LISINOPRIL 5 MG/1
5 TABLET ORAL DAILY
Status: DISCONTINUED | OUTPATIENT
Start: 2023-12-09 | End: 2023-12-10

## 2023-12-09 RX ORDER — ASPIRIN 81 MG/1
81 TABLET, CHEWABLE ORAL DAILY
Status: DISCONTINUED | OUTPATIENT
Start: 2023-12-09 | End: 2023-12-11 | Stop reason: HOSPADM

## 2023-12-09 RX ORDER — LORAZEPAM 2 MG/ML
2 INJECTION INTRAMUSCULAR
Status: DISCONTINUED | OUTPATIENT
Start: 2023-12-09 | End: 2023-12-09

## 2023-12-09 RX ADMIN — MULTIVITAMIN TABLET 1 TABLET: TABLET at 15:30

## 2023-12-09 RX ADMIN — CARIPRAZINE 1.5 MG: 1.5 CAPSULE, GELATIN COATED ORAL at 21:07

## 2023-12-09 RX ADMIN — SODIUM CHLORIDE, PRESERVATIVE FREE 10 ML: 5 INJECTION INTRAVENOUS at 15:31

## 2023-12-09 RX ADMIN — GLIPIZIDE 10 MG: 5 TABLET ORAL at 15:30

## 2023-12-09 RX ADMIN — ASPIRIN 81 MG CHEWABLE TABLET 81 MG: 81 TABLET CHEWABLE at 15:30

## 2023-12-09 RX ADMIN — METFORMIN HYDROCHLORIDE 1000 MG: 1000 TABLET ORAL at 15:30

## 2023-12-09 RX ADMIN — SODIUM CHLORIDE 1000 ML: 9 INJECTION, SOLUTION INTRAVENOUS at 08:54

## 2023-12-09 RX ADMIN — SERTRALINE 100 MG: 100 TABLET, FILM COATED ORAL at 21:08

## 2023-12-09 RX ADMIN — LISINOPRIL 5 MG: 5 TABLET ORAL at 15:30

## 2023-12-09 RX ADMIN — SODIUM CHLORIDE, PRESERVATIVE FREE 10 ML: 5 INJECTION INTRAVENOUS at 21:08

## 2023-12-09 RX ADMIN — LORAZEPAM 1 MG: 1 TABLET ORAL at 10:42

## 2023-12-09 RX ADMIN — ENOXAPARIN SODIUM 40 MG: 100 INJECTION SUBCUTANEOUS at 15:31

## 2023-12-09 RX ADMIN — SPIRONOLACTONE 25 MG: 25 TABLET ORAL at 15:30

## 2023-12-09 RX ADMIN — FOLIC ACID 1 MG: 1 TABLET ORAL at 15:30

## 2023-12-09 RX ADMIN — METOPROLOL TARTRATE 25 MG: 25 TABLET, FILM COATED ORAL at 21:07

## 2023-12-09 RX ADMIN — IOPAMIDOL 75 ML: 755 INJECTION, SOLUTION INTRAVENOUS at 13:48

## 2023-12-09 RX ADMIN — Medication 100 MG: at 15:29

## 2023-12-09 RX ADMIN — METOPROLOL TARTRATE 25 MG: 25 TABLET, FILM COATED ORAL at 15:30

## 2023-12-09 RX ADMIN — LORAZEPAM 1 MG: 1 TABLET ORAL at 12:49

## 2023-12-09 RX ADMIN — METFORMIN HYDROCHLORIDE 1000 MG: 1000 TABLET ORAL at 21:07

## 2023-12-09 RX ADMIN — SODIUM CHLORIDE 1000 ML: 9 INJECTION, SOLUTION INTRAVENOUS at 12:46

## 2023-12-09 ASSESSMENT — PAIN - FUNCTIONAL ASSESSMENT
PAIN_FUNCTIONAL_ASSESSMENT: NONE - DENIES PAIN

## 2023-12-09 ASSESSMENT — LIFESTYLE VARIABLES
HOW MANY STANDARD DRINKS CONTAINING ALCOHOL DO YOU HAVE ON A TYPICAL DAY: 3 OR 4
HOW OFTEN DO YOU HAVE A DRINK CONTAINING ALCOHOL: 4 OR MORE TIMES A WEEK

## 2023-12-09 NOTE — ED PROVIDER NOTES
Head: Normocephalic and atraumatic. Mouth/Throat:      Mouth: Mucous membranes are moist.      Pharynx: Oropharynx is clear. Eyes:      Extraocular Movements: Extraocular movements intact. Conjunctiva/sclera: Conjunctivae normal.      Pupils: Pupils are equal, round, and reactive to light. Cardiovascular:      Rate and Rhythm: Regular rhythm. Tachycardia present. Pulses:           Radial pulses are 2+ on the right side and 2+ on the left side. Dorsalis pedis pulses are 2+ on the right side and 2+ on the left side. Posterior tibial pulses are 2+ on the right side and 2+ on the left side. Heart sounds: Normal heart sounds. Pulmonary:      Effort: Pulmonary effort is normal.      Breath sounds: Normal breath sounds. Abdominal:      General: Abdomen is flat. Palpations: Abdomen is soft. Tenderness: There is no abdominal tenderness. Musculoskeletal:      Cervical back: Normal range of motion and neck supple. Right lower leg: No edema. Left lower leg: No edema. Skin:     General: Skin is warm and dry. Neurological:      General: No focal deficit present. Mental Status: He is alert and oriented to person, place, and time. Motor: Tremor present. Psychiatric:         Mood and Affect: Mood normal.         Behavior: Behavior normal.         -------------------------------------------------- RESULTS -------------------------------------------------  I have personally reviewed and interpreted all laboratory and imaging results for this patient. Results are listed below. LABS:  Results for orders placed or performed during the hospital encounter of 12/09/23   COVID-19, Rapid    Specimen: Nasopharyngeal Swab   Result Value Ref Range    Specimen Description . NASOPHARYNGEAL SWAB     SARS-CoV-2, Rapid Not Detected Not Detected   Influenza A+B, PCR    Specimen: Nasopharyngeal   Result Value Ref Range    Influenza A by PCR Not Detected Not Detected

## 2023-12-09 NOTE — H&P
[facility]   History and Physical      CHIEF COMPLAINT: Tremor    History of Present Illness:  66 y.o. male with a history of DM, HTN, HLD presents with shakiness. Patient states he has developed shakiness last night and has been constant since. Patient sitting up on side of the bed states feels much improved. Eating dinner at this time. States typically drinks 4-5 beers daily. He denies any fevers, chills, N/V/D, CP, SOB. Patient received 1L NSS bolus in ED course. Decision to admit patient for further evaluation and treatment. Informant(s) for H&P: Pt and EMR    REVIEW OF SYSTEMS:  Admits to tremors denies fevers, chills, cp, sob, n/v, ha, vision/hearing changes, wt changes, hot/cold flashes, other open skin lesions, diarrhea, constipation, dysuria/hematuria unless noted in HPI. Complete ROS performed with the patient and is otherwise negative. PMH:  Past Medical History:   Diagnosis Date    Diabetes mellitus ([address])     Hyperlipidemia     Hypertension        Surgical History:  Past Surgical History:   Procedure Laterality Date    APPENDECTOMY      ARM SURGERY Left 10/15/2021    LEFT ELBOW INCISION AND DRAINAGE performed by Evette Salgado MD at [address] [facility]  [facility]  [facility]         HERNIA REPAIR  2008 & 2013       Medications Prior to Admission:    Prior to Admission medications    Medication Sig Start Date End Date Taking?  Authorizing Provider   tiZANidine (ZANAFLEX) 4 MG tablet Take 0.5-1 tablets by mouth every 8 hours as needed (back pain) 12/16/22   Hardy Rowland MD   metFORMIN (GLUCOPHAGE) 1000 MG tablet Take 1 tablet by mouth 2 times daily (with meals)    Stefano Parker MD   omeprazole (PRILOSEC) 40 MG delayed release capsule Take 1 capsule by mouth daily    Stefano Parker MD   glipiZIDE (GLUCOTROL) 10 MG tablet Take 1 tablet by mouth 3 times daily (before meals)    Stefano Parker MD   spironolactone (ALDACTONE) 25

## 2023-12-09 NOTE — PROGRESS NOTES
Due to critical shortage of IV lorazepam at this time, CIWA order will initially be PO only. If patient unable to take PO, contact pharmacy for IV option.

## 2023-12-09 NOTE — CONSULTS
12/09/23  0840   *   ALT 86*   LABALBU 4.5      Latest Reference Range & Units 08/18/23 17:37 08/18/23 19:32 12/09/23 08:40 12/09/23 10:29 12/09/23 12:10   Troponin, High Sensitivity 0 - 11 ng/L 20 (H) 18 (H) 94 (H) 98 (H) 108 (H)   (H): Data is abnormally high     Latest Reference Range & Units 12/09/23 12:10   ETHANOL,ETHA <10 mg/dL <10     12/19/2023 CXR:  No acute process. IMPRESSION and PLAN to follow per Dr. Joselito Blake     Electronically signed by MARCELLA Platt CNP on 12/9/2023 at 1:02 PM     The above documentation has been prepared under my direction and personally reviewed by me in its entirety. I confirm that the note above accurately reflects all work, treatment, procedures, and medical decision making performed by me. The patient's history was independently obtained. The patient was independently examined. Electrocardiogram, prior and present cardiovascular assessment, and laboratory studies were reviewed. The patient is a 59-year-old white male with no active association [facility] and remote evaluation by Diana Alarcon in the face of precordial chest pain. He additionally has a history of chronic obstructive lung disease in the face of active tobacco consumption, hypertension, diabetes, hyperlipidemia and a history of chronic ethanol abuse with average consumption of approximately 6 ounces daily or multiple times weekly with his most recent drink within the past 24 hours. He has undergone most recent objective assessment with a gated vasodilator myocardial perfusion imaging study in August, 2023 demonstrating a reported fixed inferior perfusion abnormality in the face of normal left ventricular systolic function suggestive of a low risk of cardiovascular events and images unavailable for review. He reports that he is normally active with symptoms limits that of exertional dyspnea and in the absence of chest discomfort or other ischemic equivalents.   On the day of evaluation,

## 2023-12-09 NOTE — PROGRESS NOTES
4 Eyes Skin Assessment     NAME:  Clau Malik  YOB: 1960  MEDICAL RECORD NUMBER:  [id number]    The patient is being assessed for  Admission    I agree that at least one RN has performed a thorough Head to Toe Skin Assessment on the patient. ALL assessment sites listed below have been assessed. Areas assessed by both nurses:    Head, Face, Ears, Shoulders, Back, Chest, Arms, Elbows, Hands, Sacrum. Buttock, Coccyx, Ischium, Legs. Feet and Heels, and Under Medical Devices         Does the Patient have a Wound?  No noted wound(s)       Demetris Prevention initiated by RN: No  Wound Care Orders initiated by RN: No    Pressure Injury (Stage 3,4, Unstageable, DTI, NWPT, and Complex wounds) if present, place Wound referral order by RN under : No    New Ostomies, if present place, Ostomy referral order under : No     Nurse 1 eSignature: Electronically signed by Artis Lipscomb RN on 12/9/23 at 4:57 PM EST    **SHARE this note so that the co-signing nurse can place an eSignature**    Nurse 2 eSignature: Electronically signed by Lenka Bledsoe RN on 12/9/23 at 6:50 PM EST

## 2023-12-10 ENCOUNTER — APPOINTMENT (OUTPATIENT)
Age: 63
End: 2023-12-10
Payer: COMMERCIAL

## 2023-12-10 LAB
ANION GAP SERPL CALCULATED.3IONS-SCNC: 13 MMOL/L (ref 7–16)
BASOPHILS # BLD: 0.01 K/UL (ref 0–0.2)
BASOPHILS NFR BLD: 0 % (ref 0–2)
BUN SERPL-MCNC: 11 MG/DL (ref 6–23)
CALCIUM SERPL-MCNC: 8.2 MG/DL (ref 8.6–10.2)
CHLORIDE SERPL-SCNC: 104 MMOL/L (ref 98–107)
CO2 SERPL-SCNC: 22 MMOL/L (ref 22–29)
CREAT SERPL-MCNC: 0.9 MG/DL (ref 0.7–1.2)
ECHO AO ASC DIAM: 3.4 CM
ECHO AO ASCENDING AORTA INDEX: 1.71 CM/M2
ECHO AV AREA PEAK VELOCITY: 4.1 CM2
ECHO AV AREA VTI: 5.1 CM2
ECHO AV AREA/BSA PEAK VELOCITY: 2.1 CM2/M2
ECHO AV AREA/BSA VTI: 2.6 CM2/M2
ECHO AV CUSP MM: 1.8 CM
ECHO AV MEAN GRADIENT: 3 MMHG
ECHO AV MEAN VELOCITY: 0.8 M/S
ECHO AV PEAK GRADIENT: 6 MMHG
ECHO AV PEAK VELOCITY: 1.2 M/S
ECHO AV VELOCITY RATIO: 0.92
ECHO AV VTI: 21.8 CM
ECHO BSA: 2.03 M2
ECHO EST RA PRESSURE: 3 MMHG
ECHO LA DIAMETER INDEX: 1.56 CM/M2
ECHO LA DIAMETER: 3.1 CM
ECHO LA VOL A-L A2C: 24 ML (ref 18–58)
ECHO LA VOL A-L A4C: 24 ML (ref 18–58)
ECHO LA VOL MOD A2C: 23 ML (ref 18–58)
ECHO LA VOL MOD A4C: 22 ML (ref 18–58)
ECHO LA VOLUME AREA LENGTH: 26 ML
ECHO LA VOLUME INDEX A-L A2C: 12 ML/M2 (ref 16–34)
ECHO LA VOLUME INDEX A-L A4C: 12 ML/M2 (ref 16–34)
ECHO LA VOLUME INDEX AREA LENGTH: 13 ML/M2 (ref 16–34)
ECHO LA VOLUME INDEX MOD A2C: 12 ML/M2 (ref 16–34)
ECHO LA VOLUME INDEX MOD A4C: 11 ML/M2 (ref 16–34)
ECHO LV FRACTIONAL SHORTENING: 33 % (ref 28–44)
ECHO LV INTERNAL DIMENSION DIASTOLE INDEX: 2.41 CM/M2
ECHO LV INTERNAL DIMENSION DIASTOLIC: 4.8 CM (ref 4.2–5.9)
ECHO LV INTERNAL DIMENSION SYSTOLIC INDEX: 1.61 CM/M2
ECHO LV INTERNAL DIMENSION SYSTOLIC: 3.2 CM
ECHO LV IVSD: 1 CM (ref 0.6–1)
ECHO LV IVSS: 1.3 CM
ECHO LV MASS 2D: 181.9 G (ref 88–224)
ECHO LV MASS INDEX 2D: 91.4 G/M2 (ref 49–115)
ECHO LV POSTERIOR WALL DIASTOLIC: 1.1 CM (ref 0.6–1)
ECHO LV POSTERIOR WALL SYSTOLIC: 1.3 CM
ECHO LV RELATIVE WALL THICKNESS RATIO: 0.46
ECHO LVOT AREA: 4.5 CM2
ECHO LVOT AV VTI INDEX: 1.11
ECHO LVOT DIAM: 2.4 CM
ECHO LVOT MEAN GRADIENT: 2 MMHG
ECHO LVOT PEAK GRADIENT: 5 MMHG
ECHO LVOT PEAK VELOCITY: 1.1 M/S
ECHO LVOT STROKE VOLUME INDEX: 55.2 ML/M2
ECHO LVOT SV: 109.9 ML
ECHO LVOT VTI: 24.3 CM
ECHO MV A VELOCITY: 0.54 M/S
ECHO MV AREA VTI: 3.7 CM2
ECHO MV E DECELERATION TIME (DT): 226.7 MS
ECHO MV E VELOCITY: 0.68 M/S
ECHO MV E/A RATIO: 1.26
ECHO MV LVOT VTI INDEX: 1.22
ECHO MV MAX VELOCITY: 1 M/S
ECHO MV MEAN GRADIENT: 2 MMHG
ECHO MV MEAN VELOCITY: 0.6 M/S
ECHO MV PEAK GRADIENT: 4 MMHG
ECHO MV VTI: 29.6 CM
ECHO PV MAX VELOCITY: 0.7 M/S
ECHO PV MEAN GRADIENT: 1 MMHG
ECHO PV MEAN VELOCITY: 0.5 M/S
ECHO PV PEAK GRADIENT: 2 MMHG
ECHO PV VTI: 13.9 CM
ECHO PVEIN PEAK D VELOCITY: 0.4 M/S
ECHO PVEIN PEAK S VELOCITY: 0.7 M/S
ECHO PVEIN S/D RATIO: 1.8
ECHO RV INTERNAL DIMENSION: 2.3 CM
EOSINOPHIL # BLD: 0.02 K/UL (ref 0.05–0.5)
EOSINOPHILS RELATIVE PERCENT: 0 % (ref 0–6)
ERYTHROCYTE [DISTWIDTH] IN BLOOD BY AUTOMATED COUNT: 13.6 % (ref 11.5–15)
GFR SERPL CREATININE-BSD FRML MDRD: >60 ML/MIN/1.73M2
GLUCOSE BLD-MCNC: 164 MG/DL (ref 74–99)
GLUCOSE SERPL-MCNC: 104 MG/DL (ref 74–99)
HCT VFR BLD AUTO: 41.2 % (ref 37–54)
HGB BLD-MCNC: 13.7 G/DL (ref 12.5–16.5)
IMM GRANULOCYTES # BLD AUTO: <0.03 K/UL (ref 0–0.58)
IMM GRANULOCYTES NFR BLD: 0 % (ref 0–5)
LYMPHOCYTES NFR BLD: 2.12 K/UL (ref 1.5–4)
LYMPHOCYTES RELATIVE PERCENT: 32 % (ref 20–42)
MAGNESIUM SERPL-MCNC: 1.6 MG/DL (ref 1.6–2.6)
MCH RBC QN AUTO: 31.9 PG (ref 26–35)
MCHC RBC AUTO-ENTMCNC: 33.3 G/DL (ref 32–34.5)
MCV RBC AUTO: 96 FL (ref 80–99.9)
MONOCYTES NFR BLD: 0.6 K/UL (ref 0.1–0.95)
MONOCYTES NFR BLD: 9 % (ref 2–12)
NEUTROPHILS NFR BLD: 58 % (ref 43–80)
NEUTS SEG NFR BLD: 3.88 K/UL (ref 1.8–7.3)
PLATELET # BLD AUTO: 147 K/UL (ref 130–450)
PMV BLD AUTO: 10 FL (ref 7–12)
POTASSIUM SERPL-SCNC: 3.4 MMOL/L (ref 3.5–5)
RBC # BLD AUTO: 4.29 M/UL (ref 3.8–5.8)
SODIUM SERPL-SCNC: 139 MMOL/L (ref 132–146)
TROPONIN I SERPL HS-MCNC: 37 NG/L (ref 0–11)
WBC OTHER # BLD: 6.6 K/UL (ref 4.5–11.5)

## 2023-12-10 PROCEDURE — 99233 SBSQ HOSP IP/OBS HIGH 50: CPT | Performed by: INTERNAL MEDICINE

## 2023-12-10 PROCEDURE — 83735 ASSAY OF MAGNESIUM: CPT

## 2023-12-10 PROCEDURE — 93306 TTE W/DOPPLER COMPLETE: CPT

## 2023-12-10 PROCEDURE — 6370000000 HC RX 637 (ALT 250 FOR IP): Performed by: INTERNAL MEDICINE

## 2023-12-10 PROCEDURE — 84484 ASSAY OF TROPONIN QUANT: CPT

## 2023-12-10 PROCEDURE — 93005 ELECTROCARDIOGRAM TRACING: CPT | Performed by: INTERNAL MEDICINE

## 2023-12-10 PROCEDURE — 82962 GLUCOSE BLOOD TEST: CPT

## 2023-12-10 PROCEDURE — 2580000003 HC RX 258

## 2023-12-10 PROCEDURE — 80048 BASIC METABOLIC PNL TOTAL CA: CPT

## 2023-12-10 PROCEDURE — 6360000002 HC RX W HCPCS: Performed by: INTERNAL MEDICINE

## 2023-12-10 PROCEDURE — 99222 1ST HOSP IP/OBS MODERATE 55: CPT | Performed by: INTERNAL MEDICINE

## 2023-12-10 PROCEDURE — 1200000000 HC SEMI PRIVATE

## 2023-12-10 PROCEDURE — 93306 TTE W/DOPPLER COMPLETE: CPT | Performed by: INTERNAL MEDICINE

## 2023-12-10 PROCEDURE — 85025 COMPLETE CBC W/AUTO DIFF WBC: CPT

## 2023-12-10 RX ORDER — NICOTINE 21 MG/24HR
1 PATCH, TRANSDERMAL 24 HOURS TRANSDERMAL DAILY
Status: DISCONTINUED | OUTPATIENT
Start: 2023-12-10 | End: 2023-12-11 | Stop reason: HOSPADM

## 2023-12-10 RX ORDER — POTASSIUM CHLORIDE 20 MEQ/1
40 TABLET, EXTENDED RELEASE ORAL ONCE
Status: COMPLETED | OUTPATIENT
Start: 2023-12-10 | End: 2023-12-10

## 2023-12-10 RX ORDER — LISINOPRIL 20 MG/1
20 TABLET ORAL DAILY
Status: DISCONTINUED | OUTPATIENT
Start: 2023-12-11 | End: 2023-12-11 | Stop reason: HOSPADM

## 2023-12-10 RX ADMIN — METOPROLOL TARTRATE 25 MG: 25 TABLET, FILM COATED ORAL at 08:14

## 2023-12-10 RX ADMIN — POTASSIUM CHLORIDE 40 MEQ: 1500 TABLET, EXTENDED RELEASE ORAL at 11:10

## 2023-12-10 RX ADMIN — METFORMIN HYDROCHLORIDE 1000 MG: 1000 TABLET ORAL at 16:58

## 2023-12-10 RX ADMIN — GLIPIZIDE 10 MG: 5 TABLET ORAL at 11:46

## 2023-12-10 RX ADMIN — FOLIC ACID 1 MG: 1 TABLET ORAL at 08:14

## 2023-12-10 RX ADMIN — CARIPRAZINE 1.5 MG: 1.5 CAPSULE, GELATIN COATED ORAL at 21:20

## 2023-12-10 RX ADMIN — METOPROLOL TARTRATE 25 MG: 25 TABLET, FILM COATED ORAL at 21:20

## 2023-12-10 RX ADMIN — GLIPIZIDE 10 MG: 5 TABLET ORAL at 06:57

## 2023-12-10 RX ADMIN — SODIUM CHLORIDE, PRESERVATIVE FREE 10 ML: 5 INJECTION INTRAVENOUS at 08:15

## 2023-12-10 RX ADMIN — GLIPIZIDE 10 MG: 5 TABLET ORAL at 16:58

## 2023-12-10 RX ADMIN — SERTRALINE 100 MG: 100 TABLET, FILM COATED ORAL at 21:20

## 2023-12-10 RX ADMIN — MULTIVITAMIN TABLET 1 TABLET: TABLET at 08:14

## 2023-12-10 RX ADMIN — LISINOPRIL 5 MG: 5 TABLET ORAL at 08:14

## 2023-12-10 RX ADMIN — ASPIRIN 81 MG CHEWABLE TABLET 81 MG: 81 TABLET CHEWABLE at 08:14

## 2023-12-10 RX ADMIN — Medication 100 MG: at 08:13

## 2023-12-10 RX ADMIN — ENOXAPARIN SODIUM 40 MG: 100 INJECTION SUBCUTANEOUS at 08:14

## 2023-12-10 RX ADMIN — PANTOPRAZOLE SODIUM 40 MG: 40 TABLET, DELAYED RELEASE ORAL at 06:57

## 2023-12-10 RX ADMIN — SPIRONOLACTONE 25 MG: 25 TABLET ORAL at 08:14

## 2023-12-10 RX ADMIN — SODIUM CHLORIDE, PRESERVATIVE FREE 10 ML: 5 INJECTION INTRAVENOUS at 21:21

## 2023-12-10 RX ADMIN — METFORMIN HYDROCHLORIDE 1000 MG: 1000 TABLET ORAL at 08:14

## 2023-12-10 NOTE — PROGRESS NOTES
[facility] Progress Note    Admitting Date and Time: 12/9/2023  7:49 AM  Admit Dx: Elevated troponin [R79.89]  Acute chest pain [R07.9]  Acute hyperactive alcohol withdrawal delirium ([address]) [F10.931]  This is a 82-year-old male with past medical history of alcohol abuse came here due to shakiness. He is a heavy drinker, drinks vodka and beer every day. He is being treated for alcohol withdrawal and his troponin was elevated cardiology consult called. Cardiology consult appreciated and echo done, normal left ventricular function and no further recommendation. Subjective:  Patient is being followed for Elevated troponin [R79.89]  Acute chest pain [R07.9]  Acute hyperactive alcohol withdrawal delirium ([address]) [F10.931]   Pt feels feeling okay no shakiness no      ROS: denies fever, chills, cp, sob, n/v, HA unless stated above.       [START ON 12/11/2023] lisinopril  20 mg Oral Daily    sodium chloride flush  5-40 mL IntraVENous 2 times per day    metFORMIN  1,000 mg Oral BID WC    glipiZIDE  10 mg Oral TID AC    spironolactone  25 mg Oral Daily    metoprolol tartrate  25 mg Oral BID    sertraline  100 mg Oral Nightly    cariprazine hcl  1.5 mg Oral Nightly    aspirin  81 mg Oral Daily    pantoprazole  40 mg Oral QAM AC    enoxaparin  40 mg SubCUTAneous Daily    folic acid  1 mg Oral Daily    thiamine  100 mg Oral Daily    multivitamin  1 tablet Oral Daily     sodium chloride flush, 5-40 mL, PRN  sodium chloride, , PRN  LORazepam, 1 mg, Q1H PRN   Or  LORazepam, 2 mg, Q1H PRN   Or  LORazepam, 3 mg, Q1H PRN   Or  LORazepam, 4 mg, Q1H PRN  tiZANidine, 2 mg, Q8H PRN  potassium chloride, 40 mEq, PRN   Or  potassium alternative oral replacement, 40 mEq, PRN   Or  potassium chloride, 10 mEq, PRN  magnesium sulfate, 2,000 mg, PRN  ondansetron, 4 mg, Q8H PRN   Or  ondansetron, 4 mg, Q6H PRN  polyethylene glycol, 17 g, Daily PRN  acetaminophen, 650 mg, Q6H PRN   Or  acetaminophen, 650 mg, Q6H PRN

## 2023-12-10 NOTE — PROGRESS NOTES
The patient's echocardiogram was reviewed and demonstrates evidence of a normal size left ventricular chamber with no regional wall motion abnormalities and normal left ventricular systolic function. No significant valvular abnormalities are present with borderline dilation of the aortic root. On this basis, aggressive medical management and risk factor modification are recommended with no additional needs of cardiovascular evaluation. We will further evaluate him should additional cardiovascular difficulties or concerns arise with additional management deferred to primary care.

## 2023-12-10 NOTE — CARE COORDINATION
CASE MANAGEMENT. .. Husam Guerrero Met with patient at the bedside. He is a/o x4. States he is independent at home and lives with his brother. He has glucometer, nebulizer and inhalers.  order noted for \"rehab\". Dorian admits to drinking 4-5 beers/day and more on the weekends with friends. Offered for him to speak with PRS, but he is not interested. States he doesn't have a problem. For now, he is being seen by cardio for elevated troponins and TTE is ordered. Nursing confirmed with echo dept that test will be completed today. Continue to monitor labs/vitals and s/s of withdrawal. Confirmed he will have ride home. No needs noted at this time. Will follow.

## 2023-12-10 NOTE — PROGRESS NOTES
INPATIENT CARDIOLOGY FOLLOW-UP    Name: Maryan Campos    Age: 66 y.o. Date of Admission: 12/9/2023  7:49 AM    Date of Service: 12/10/2023    Chief Complaint: Follow-up for tremulousness, abnormal troponin, chronic obstructive lung disease, hypertension, alcohol abuse, moderate obesity    Interim History: The patient continues to deny any cardiovascular symptoms and remains mildly tremulous, albeit improved from that previously noted. A contrast CT angiogram excluded the presence of a pulmonary embolism no additional significant cardiothoracic pathology with incidentally noted nephrolithiasis. Review of Systems: The remainder of a complete multisystem review including consitutional, central nervous, respiratory, circulatory, gastrointestinal, genitourinary, endocrinologic, hematologic, musculoskeletal and psychiatric are negative.     Problem List:  Patient Active Problem List   Diagnosis    COPD (chronic obstructive pulmonary disease) (HCC)    Precordial pain    Mixed hyperlipidemia    PSVT (paroxysmal supraventricular tachycardia)    Hypertension    Hyperlipidemia    Diabetes mellitus (HCC)    Left arm cellulitis    Chest pain in adult    Acute chest pain    Acute hyperactive alcohol withdrawal delirium (HCC)    Elevated troponin    Tremulousness    Alcohol abuse    Alcoholic hepatitis without ascites       Allergies:  No Known Allergies    Current Medications:  Current Facility-Administered Medications   Medication Dose Route Frequency Provider Last Rate Last Admin    sodium chloride flush 0.9 % injection 5-40 mL  5-40 mL IntraVENous 2 times per day Coretta Browerville, DO   10 mL at 12/09/23 2108    sodium chloride flush 0.9 % injection 5-40 mL  5-40 mL IntraVENous PRN Coretta Browerville, DO        0.9 % sodium chloride infusion   IntraVENous PRN Coretta Browerville, DO        LORazepam (ATIVAN) tablet 1 mg  1 mg Oral Q1H PRN Coretta Browerville, DO   1 mg at 12/09/23 1249    Or    LORazepam (ATIVAN) tablet 2 mg  2

## 2023-12-11 VITALS
RESPIRATION RATE: 18 BRPM | BODY MASS INDEX: 29.35 KG/M2 | OXYGEN SATURATION: 97 % | SYSTOLIC BLOOD PRESSURE: 160 MMHG | HEART RATE: 74 BPM | DIASTOLIC BLOOD PRESSURE: 86 MMHG | TEMPERATURE: 98.2 F | WEIGHT: 187 LBS | HEIGHT: 67 IN

## 2023-12-11 LAB
ANION GAP SERPL CALCULATED.3IONS-SCNC: 12 MMOL/L (ref 7–16)
BASOPHILS # BLD: 0.01 K/UL (ref 0–0.2)
BASOPHILS NFR BLD: 0 % (ref 0–2)
BUN SERPL-MCNC: 9 MG/DL (ref 6–23)
CALCIUM SERPL-MCNC: 8.9 MG/DL (ref 8.6–10.2)
CHLORIDE SERPL-SCNC: 100 MMOL/L (ref 98–107)
CO2 SERPL-SCNC: 22 MMOL/L (ref 22–29)
CREAT SERPL-MCNC: 0.7 MG/DL (ref 0.7–1.2)
EKG ATRIAL RATE: 118 BPM
EKG ATRIAL RATE: 82 BPM
EKG P AXIS: 5 DEGREES
EKG P AXIS: 54 DEGREES
EKG P-R INTERVAL: 144 MS
EKG P-R INTERVAL: 164 MS
EKG Q-T INTERVAL: 324 MS
EKG Q-T INTERVAL: 388 MS
EKG QRS DURATION: 86 MS
EKG QRS DURATION: 92 MS
EKG QTC CALCULATION (BAZETT): 453 MS
EKG QTC CALCULATION (BAZETT): 454 MS
EKG R AXIS: -56 DEGREES
EKG R AXIS: 127 DEGREES
EKG T AXIS: 118 DEGREES
EKG T AXIS: 21 DEGREES
EKG VENTRICULAR RATE: 118 BPM
EKG VENTRICULAR RATE: 82 BPM
EOSINOPHIL # BLD: 0.04 K/UL (ref 0.05–0.5)
EOSINOPHILS RELATIVE PERCENT: 1 % (ref 0–6)
ERYTHROCYTE [DISTWIDTH] IN BLOOD BY AUTOMATED COUNT: 13.1 % (ref 11.5–15)
GFR SERPL CREATININE-BSD FRML MDRD: >60 ML/MIN/1.73M2
GLUCOSE BLD-MCNC: 112 MG/DL (ref 74–99)
GLUCOSE SERPL-MCNC: 114 MG/DL (ref 74–99)
HCT VFR BLD AUTO: 40.1 % (ref 37–54)
HGB BLD-MCNC: 13.6 G/DL (ref 12.5–16.5)
IMM GRANULOCYTES # BLD AUTO: <0.03 K/UL (ref 0–0.58)
IMM GRANULOCYTES NFR BLD: 0 % (ref 0–5)
LYMPHOCYTES NFR BLD: 2.46 K/UL (ref 1.5–4)
LYMPHOCYTES RELATIVE PERCENT: 40 % (ref 20–42)
MAGNESIUM SERPL-MCNC: 1.4 MG/DL (ref 1.6–2.6)
MCH RBC QN AUTO: 31.9 PG (ref 26–35)
MCHC RBC AUTO-ENTMCNC: 33.9 G/DL (ref 32–34.5)
MCV RBC AUTO: 93.9 FL (ref 80–99.9)
MONOCYTES NFR BLD: 0.61 K/UL (ref 0.1–0.95)
MONOCYTES NFR BLD: 10 % (ref 2–12)
NEUTROPHILS NFR BLD: 48 % (ref 43–80)
NEUTS SEG NFR BLD: 2.95 K/UL (ref 1.8–7.3)
PLATELET, FLUORESCENCE: 139 K/UL (ref 130–450)
PMV BLD AUTO: 9.9 FL (ref 7–12)
POTASSIUM SERPL-SCNC: 3.3 MMOL/L (ref 3.5–5)
RBC # BLD AUTO: 4.27 M/UL (ref 3.8–5.8)
SODIUM SERPL-SCNC: 134 MMOL/L (ref 132–146)
WBC OTHER # BLD: 6.1 K/UL (ref 4.5–11.5)

## 2023-12-11 PROCEDURE — 80048 BASIC METABOLIC PNL TOTAL CA: CPT

## 2023-12-11 PROCEDURE — 85025 COMPLETE CBC W/AUTO DIFF WBC: CPT

## 2023-12-11 PROCEDURE — 93010 ELECTROCARDIOGRAM REPORT: CPT | Performed by: INTERNAL MEDICINE

## 2023-12-11 PROCEDURE — 6370000000 HC RX 637 (ALT 250 FOR IP): Performed by: INTERNAL MEDICINE

## 2023-12-11 PROCEDURE — 6360000002 HC RX W HCPCS: Performed by: INTERNAL MEDICINE

## 2023-12-11 PROCEDURE — 82962 GLUCOSE BLOOD TEST: CPT

## 2023-12-11 PROCEDURE — 83735 ASSAY OF MAGNESIUM: CPT

## 2023-12-11 PROCEDURE — 6360000002 HC RX W HCPCS: Performed by: STUDENT IN AN ORGANIZED HEALTH CARE EDUCATION/TRAINING PROGRAM

## 2023-12-11 PROCEDURE — 99239 HOSP IP/OBS DSCHRG MGMT >30: CPT | Performed by: STUDENT IN AN ORGANIZED HEALTH CARE EDUCATION/TRAINING PROGRAM

## 2023-12-11 PROCEDURE — 2580000003 HC RX 258

## 2023-12-11 RX ORDER — OMEPRAZOLE 40 MG/1
40 CAPSULE, DELAYED RELEASE ORAL DAILY
Qty: 30 CAPSULE | Refills: 3 | Status: SHIPPED | OUTPATIENT
Start: 2023-12-11

## 2023-12-11 RX ORDER — LANOLIN ALCOHOL/MO/W.PET/CERES
400 CREAM (GRAM) TOPICAL DAILY
Status: DISCONTINUED | OUTPATIENT
Start: 2023-12-12 | End: 2023-12-11 | Stop reason: HOSPADM

## 2023-12-11 RX ORDER — LISINOPRIL 20 MG/1
20 TABLET ORAL DAILY
Qty: 30 TABLET | Refills: 3 | Status: SHIPPED | OUTPATIENT
Start: 2023-12-12 | End: 2023-12-11 | Stop reason: SDUPTHER

## 2023-12-11 RX ORDER — FOLIC ACID 1 MG/1
1 TABLET ORAL DAILY
Qty: 30 TABLET | Refills: 3 | Status: SHIPPED | OUTPATIENT
Start: 2023-12-11

## 2023-12-11 RX ORDER — ASPIRIN 81 MG/1
81 TABLET, CHEWABLE ORAL DAILY
Qty: 30 TABLET | Refills: 3 | Status: SHIPPED | OUTPATIENT
Start: 2023-12-11

## 2023-12-11 RX ORDER — TIZANIDINE 4 MG/1
2-4 TABLET ORAL EVERY 8 HOURS PRN
Qty: 30 TABLET | Refills: 0 | Status: SHIPPED | OUTPATIENT
Start: 2023-12-11

## 2023-12-11 RX ORDER — MAGNESIUM SULFATE IN WATER 40 MG/ML
2000 INJECTION, SOLUTION INTRAVENOUS ONCE
Status: COMPLETED | OUTPATIENT
Start: 2023-12-11 | End: 2023-12-11

## 2023-12-11 RX ORDER — LANOLIN ALCOHOL/MO/W.PET/CERES
100 CREAM (GRAM) TOPICAL DAILY
Qty: 30 TABLET | Refills: 3 | Status: SHIPPED | OUTPATIENT
Start: 2023-12-12 | End: 2023-12-11 | Stop reason: SDUPTHER

## 2023-12-11 RX ORDER — NICOTINE 21 MG/24HR
1 PATCH, TRANSDERMAL 24 HOURS TRANSDERMAL DAILY
Qty: 30 PATCH | Refills: 3 | Status: SHIPPED | OUTPATIENT
Start: 2023-12-12

## 2023-12-11 RX ORDER — LANOLIN ALCOHOL/MO/W.PET/CERES
100 CREAM (GRAM) TOPICAL DAILY
Qty: 30 TABLET | Refills: 3 | Status: SHIPPED | OUTPATIENT
Start: 2023-12-12

## 2023-12-11 RX ORDER — LANOLIN ALCOHOL/MO/W.PET/CERES
400 CREAM (GRAM) TOPICAL DAILY
Qty: 10 TABLET | Refills: 0 | Status: SHIPPED | OUTPATIENT
Start: 2023-12-12 | End: 2023-12-22

## 2023-12-11 RX ORDER — ROSUVASTATIN CALCIUM 40 MG/1
40 TABLET, COATED ORAL DAILY
Qty: 30 TABLET | Refills: 3 | Status: SHIPPED | OUTPATIENT
Start: 2023-12-11

## 2023-12-11 RX ORDER — LISINOPRIL 20 MG/1
20 TABLET ORAL DAILY
Qty: 30 TABLET | Refills: 3 | Status: SHIPPED | OUTPATIENT
Start: 2023-12-12

## 2023-12-11 RX ORDER — GLIPIZIDE 10 MG/1
10 TABLET ORAL
Qty: 60 TABLET | Refills: 3 | Status: SHIPPED | OUTPATIENT
Start: 2023-12-11

## 2023-12-11 RX ORDER — SERTRALINE HYDROCHLORIDE 100 MG/1
100 TABLET, FILM COATED ORAL NIGHTLY
Qty: 30 TABLET | Refills: 3 | Status: SHIPPED | OUTPATIENT
Start: 2023-12-11

## 2023-12-11 RX ORDER — SPIRONOLACTONE 25 MG/1
25 TABLET ORAL DAILY
Qty: 30 TABLET | Refills: 3 | Status: SHIPPED | OUTPATIENT
Start: 2023-12-11

## 2023-12-11 RX ADMIN — PANTOPRAZOLE SODIUM 40 MG: 40 TABLET, DELAYED RELEASE ORAL at 06:13

## 2023-12-11 RX ADMIN — Medication 100 MG: at 09:44

## 2023-12-11 RX ADMIN — FOLIC ACID 1 MG: 1 TABLET ORAL at 09:41

## 2023-12-11 RX ADMIN — LISINOPRIL 20 MG: 20 TABLET ORAL at 09:41

## 2023-12-11 RX ADMIN — METOPROLOL TARTRATE 25 MG: 25 TABLET, FILM COATED ORAL at 09:41

## 2023-12-11 RX ADMIN — MULTIVITAMIN TABLET 1 TABLET: TABLET at 09:41

## 2023-12-11 RX ADMIN — ENOXAPARIN SODIUM 40 MG: 100 INJECTION SUBCUTANEOUS at 09:41

## 2023-12-11 RX ADMIN — ASPIRIN 81 MG CHEWABLE TABLET 81 MG: 81 TABLET CHEWABLE at 09:41

## 2023-12-11 RX ADMIN — SPIRONOLACTONE 25 MG: 25 TABLET ORAL at 09:41

## 2023-12-11 RX ADMIN — METFORMIN HYDROCHLORIDE 1000 MG: 1000 TABLET ORAL at 09:41

## 2023-12-11 RX ADMIN — GLIPIZIDE 10 MG: 5 TABLET ORAL at 09:44

## 2023-12-11 RX ADMIN — SODIUM CHLORIDE, PRESERVATIVE FREE 10 ML: 5 INJECTION INTRAVENOUS at 09:42

## 2023-12-11 RX ADMIN — POTASSIUM CHLORIDE 40 MEQ: 1500 TABLET, EXTENDED RELEASE ORAL at 06:13

## 2023-12-11 RX ADMIN — MAGNESIUM SULFATE HEPTAHYDRATE 2000 MG: 40 INJECTION, SOLUTION INTRAVENOUS at 14:59

## 2023-12-11 RX ADMIN — GLIPIZIDE 10 MG: 5 TABLET ORAL at 06:13

## 2023-12-11 NOTE — DISCHARGE SUMMARY
[facility] Physician Discharge Summary       No follow-up provider specified. Activity level: As tolerated     Dispo: Home      Condition on discharge: Stable     Patient ID:  Heather Nelson  [id number]  79 y.o.  1960    Admit date: 12/9/2023    Discharge date and time:  12/11/2023  12:38 PM    Admission Diagnoses: Principal Problem:    Acute hyperactive alcohol withdrawal delirium ([address])  Active Problems:    Elevated troponin    Tremulousness    Alcohol abuse    Alcoholic hepatitis without ascites  Resolved Problems:    * No resolved hospital problems. *      Discharge Diagnoses: Principal Problem:    Acute hyperactive alcohol withdrawal delirium (HCC)  Active Problems:    Elevated troponin    Tremulousness    Alcohol abuse    Alcoholic hepatitis without ascites  Resolved Problems:    * No resolved hospital problems. *      Consults:  IP CONSULT TO SOCIAL WORK  IP CONSULT TO CARDIOLOGY    Hospital Course:   Patient Heather Nelson is a 66 y.o. presented with Elevated troponin [R79.89]  Acute chest pain [R07.9]  Acute hyperactive alcohol withdrawal delirium ([address]) [F10.931]  Patient is admitted and managed for Alcohol withdrawal syndrome. Continue thiamine folic acid and CIWA protocol. Refused IP rehab. Hypertension: His blood pressure uncontrolled increased lisinopril 20 mg p.o. daily and monitor blood pressure OP.      Discharge Exam:  General Appearance: alert and oriented to person, place and time and in no acute distress  Skin: warm and dry  Head: normocephalic and atraumatic  Eyes: pupils equal, round, and reactive to light, extraocular eye movements intact, conjunctivae normal  Neck: neck supple and non tender without mass   Pulmonary/Chest: clear to auscultation bilaterally- no wheezes, rales or rhonchi, normal air movement, no respiratory distress  Cardiovascular: normal rate, normal S1 and S2 and no carotid bruits  Abdomen: soft, non-tender, non-distended, normal bowel sounds, no

## 2023-12-11 NOTE — CARE COORDINATION
Independent at home; HX ETOH; actively drinking; lives with brother. Cardio consulted for elevated troponins; evaluated , signed off. No needs at discharge. Declines PRS. Plan is home, has transport. Paul Nephew.

## 2023-12-11 NOTE — ADT AUTH CERT
Subscriber Details  Hospital Account [de-identified]  CV[facility] Subscriber Name/Sex/Relation Subscriber  Subscriber Address/Phone Subscriber Emp/Emp Phone   1Crissy Ramírez   [id number] Karl Saavedra - Male   (Self) 1960 [address] [facility], [address] [place] [address]   [phone](K) DISABLED      Utilization Reviews       PA INPT LETTER  Last Updated by John Garber RN on 2023 1248     Review Status Created By   In Mona Thomas RN       Review Type Associated Date   -- 2023      Criteria Review   We recommend that the following pt's current hospitalization under Inpatient status is APPROPRIATE . Name: Karl Saavedra   : 1960   CSN: [id number]   [facility]    Rationale: IV meds, Hemodynamically unstable      Clinical summary     66year old admitted with Acute alcohol abuse and withdrawal   pt is hypertensive, tachycardic  Labs show elevated lactic acid and troponin  cardio consulted. -131 --needs close monitoring on telemetry,medication adjustment with daily lab monitoring   On CIWA protocol  Labs and Imaging reviewed  Status decision based on clinical judgment and Commercial Utilization criteria, e.g., MCG, [employer]   Comments due to medical necessity, this patient is appropriate for IP              12/10   Last Updated by John Garber RN on 2023 1211     Review Status Created By   In Mona Thomas RN       Review Type Associated Date   -- 2023      Criteria Review   DATE: 12/10        RELEVANT BASELINES:   RA     PERTINENT UPDATES:  CIWA score 4  Hypokalemia K+3.4 with replacement  Continue thiamine folic acid and CIWA protocol. VITALS: 98.1  20  86  157/87  95% RA        ABNL/PERTINENT LABS/RADIOLOGY/DIAGNOSTIC STUDIES:  Calcium 8.2  Troponin 37  K+ 3.4        Echo    Left Ventricle: The EF by visual approximation is 60%. The left ventricle appears normal in size with normal thickness of endocardial surfaces.   No regional wall

## 2023-12-14 NOTE — PROGRESS NOTES
CLINICAL PHARMACY NOTE: MEDS TO BEDS    Total # of Prescriptions Filled:  14   The following medications were delivered to the patient:  Spironolactone 25 mg  Tizanidine 4 mg  Lisinopril 20 mg  Thiamine 100 mg  Omeprazole 40 mg  Folic acid 1 mg  Nicotine patch  Aspirin 81   Metoprolol 25 mg  Magnesium 400   Metformin 1000 mg  Glipizide 10 mg  Rosuvastatin 40 mg  Sertraline 100 mg    Additional Documentation:

## 2024-01-08 PROBLEM — R79.89 ELEVATED TROPONIN: Status: RESOLVED | Noted: 2023-12-09 | Resolved: 2024-01-08

## 2024-03-29 ENCOUNTER — APPOINTMENT (OUTPATIENT)
Dept: CT IMAGING | Age: 64
End: 2024-03-29
Payer: COMMERCIAL

## 2024-03-29 ENCOUNTER — HOSPITAL ENCOUNTER (EMERGENCY)
Age: 64
Discharge: HOME | End: 2024-03-29
Attending: STUDENT IN AN ORGANIZED HEALTH CARE EDUCATION/TRAINING PROGRAM
Payer: COMMERCIAL

## 2024-03-29 VITALS
OXYGEN SATURATION: 97 % | RESPIRATION RATE: 16 BRPM | BODY MASS INDEX: 29.19 KG/M2 | TEMPERATURE: 98.4 F | HEART RATE: 98 BPM | WEIGHT: 186 LBS | SYSTOLIC BLOOD PRESSURE: 108 MMHG | HEIGHT: 67 IN | DIASTOLIC BLOOD PRESSURE: 74 MMHG

## 2024-03-29 DIAGNOSIS — F10.920 ACUTE ALCOHOLIC INTOXICATION WITHOUT COMPLICATION (HCC): ICD-10-CM

## 2024-03-29 DIAGNOSIS — S09.90XA INJURY OF HEAD, INITIAL ENCOUNTER: ICD-10-CM

## 2024-03-29 DIAGNOSIS — S01.01XA LACERATION OF SCALP, INITIAL ENCOUNTER: Primary | ICD-10-CM

## 2024-03-29 LAB — ETHANOLAMINE SERPL-MCNC: 142 MG/DL

## 2024-03-29 PROCEDURE — 90471 IMMUNIZATION ADMIN: CPT | Performed by: STUDENT IN AN ORGANIZED HEALTH CARE EDUCATION/TRAINING PROGRAM

## 2024-03-29 PROCEDURE — 70450 CT HEAD/BRAIN W/O DYE: CPT

## 2024-03-29 PROCEDURE — 6360000002 HC RX W HCPCS

## 2024-03-29 PROCEDURE — 6360000002 HC RX W HCPCS: Performed by: STUDENT IN AN ORGANIZED HEALTH CARE EDUCATION/TRAINING PROGRAM

## 2024-03-29 PROCEDURE — 12002 RPR S/N/AX/GEN/TRNK2.6-7.5CM: CPT

## 2024-03-29 PROCEDURE — 90714 TD VACC NO PRESV 7 YRS+ IM: CPT | Performed by: STUDENT IN AN ORGANIZED HEALTH CARE EDUCATION/TRAINING PROGRAM

## 2024-03-29 PROCEDURE — 72125 CT NECK SPINE W/O DYE: CPT

## 2024-03-29 PROCEDURE — 96372 THER/PROPH/DIAG INJ SC/IM: CPT

## 2024-03-29 PROCEDURE — G0480 DRUG TEST DEF 1-7 CLASSES: HCPCS

## 2024-03-29 PROCEDURE — 99284 EMERGENCY DEPT VISIT MOD MDM: CPT

## 2024-03-29 RX ORDER — FENTANYL CITRATE 50 UG/ML
50 INJECTION, SOLUTION INTRAMUSCULAR; INTRAVENOUS ONCE
Status: DISCONTINUED | OUTPATIENT
Start: 2024-03-29 | End: 2024-03-29

## 2024-03-29 RX ORDER — FENTANYL CITRATE 50 UG/ML
50 INJECTION, SOLUTION INTRAMUSCULAR; INTRAVENOUS ONCE
Status: COMPLETED | OUTPATIENT
Start: 2024-03-29 | End: 2024-03-29

## 2024-03-29 RX ADMIN — FENTANYL CITRATE 50 MCG: 50 INJECTION INTRAMUSCULAR; INTRAVENOUS at 04:19

## 2024-03-29 RX ADMIN — CLOSTRIDIUM TETANI TOXOID ANTIGEN (FORMALDEHYDE INACTIVATED) AND CORYNEBACTERIUM DIPHTHERIAE TOXOID ANTIGEN (FORMALDEHYDE INACTIVATED) 0.5 ML: 5; 2 INJECTION, SUSPENSION INTRAMUSCULAR at 04:22

## 2024-03-29 ASSESSMENT — PAIN - FUNCTIONAL ASSESSMENT: PAIN_FUNCTIONAL_ASSESSMENT: NONE - DENIES PAIN

## 2024-03-29 NOTE — ED PROVIDER NOTES
0.5 mL (0.5 mLs IntraMUSCular Given 3/29/24 3972)   fentaNYL (SUBLIMAZE) injection 50 mcg (50 mcg IntraMUSCular Given 3/29/24 7819)         I am the primary provider of record       Medication List        ASK your doctor about these medications      aspirin 81 MG chewable tablet  Take 1 tablet by mouth daily     folic acid 1 MG tablet  Commonly known as: FOLVITE  Take 1 tablet by mouth daily     glipiZIDE 10 MG tablet  Commonly known as: GLUCOTROL  Take 1 tablet by mouth 3 times daily (before meals)     lisinopril 20 MG tablet  Commonly known as: PRINIVIL;ZESTRIL  Take 1 tablet by mouth daily     magnesium oxide 400 (240 Mg) MG tablet  Commonly known as: MAG-OX  Take 1 tablet by mouth daily for 10 days     metFORMIN 1000 MG tablet  Commonly known as: GLUCOPHAGE  Take 1 tablet by mouth 2 times daily (with meals)     metoprolol tartrate 25 MG tablet  Commonly known as: LOPRESSOR  Take 1 tablet by mouth 2 times daily     nicotine 14 MG/24HR  Commonly known as: NICODERM CQ  Place 1 patch onto the skin daily     omeprazole 40 MG delayed release capsule  Commonly known as: PRILOSEC  Take 1 capsule by mouth daily     rosuvastatin 40 MG tablet  Commonly known as: CRESTOR  Take 1 tablet by mouth daily     sertraline 100 MG tablet  Commonly known as: ZOLOFT  Take 1 tablet by mouth nightly Take 1 and a 1/2 tablets by mouth every evening     spironolactone 25 MG tablet  Commonly known as: ALDACTONE  Take 1 tablet by mouth daily     thiamine 100 MG tablet  Take 1 tablet by mouth daily     tiZANidine 4 MG tablet  Commonly known as: Zanaflex  Take 0.5-1 tablets by mouth every 8 hours as needed (back pain)                  Re-Evaluations:             ED Course as of 03/30/24 0044   Fri Mar 29, 2024   0431   LACERATION REPAIR  PROCEDURE NOTE:  Unless otherwise indicated, this procedure was done or directly supervised by the ED attending.    Laceration #: 1.  Location: Posterior Scalp   Length: 7cm.  The wound area was irrigated with

## 2024-03-29 NOTE — ED NOTES
Patient resting in bed, no stated problems or concerns at this time. This RN asked patient if he had a ride home, patient states there is no one he can call.

## 2024-03-29 NOTE — ED NOTES
0600 AM EDT    I received this patient at sign out from Dr. Paez   I have discussed the patient's initial exam, treatment and plan of care with the out going physician.   I have introduced my self to the patient / family and have answered their questions to this point.   I have examined the patient myself and reviewed ordered tests / medications and reviewed any available results to this point.   If a resident is involved in the Emergency Department care, I have discussed my findings and plan with them as well.    Patient was examined 10:00 this morning.  Patient is awake alert ambulates fine.  States he is hungry will be given a breakfast.  Patient has no sober ride home he had a blood alcohol level of 140 mg/dl  at 9:00 this morning.  Patient is clinically sober after eating breakfast he will be discharged home he was given outpatient follow-up instructions.  He was given return instructions and staple removal instructions.     Anibal Ortiz DO  03/29/24 1012

## 2024-05-03 ENCOUNTER — HOSPITAL ENCOUNTER (EMERGENCY)
Age: 64
Discharge: HOME | End: 2024-05-03
Attending: EMERGENCY MEDICINE
Payer: COMMERCIAL

## 2024-05-03 VITALS
WEIGHT: 168 LBS | RESPIRATION RATE: 16 BRPM | HEIGHT: 67 IN | DIASTOLIC BLOOD PRESSURE: 83 MMHG | TEMPERATURE: 97.8 F | BODY MASS INDEX: 26.37 KG/M2 | OXYGEN SATURATION: 96 % | SYSTOLIC BLOOD PRESSURE: 128 MMHG | HEART RATE: 90 BPM

## 2024-05-03 DIAGNOSIS — E16.2 HYPOGLYCEMIA: ICD-10-CM

## 2024-05-03 DIAGNOSIS — F10.10 ALCOHOL ABUSE: Primary | ICD-10-CM

## 2024-05-03 DIAGNOSIS — N17.9 AKI (ACUTE KIDNEY INJURY) (HCC): ICD-10-CM

## 2024-05-03 DIAGNOSIS — E86.0 DEHYDRATION: ICD-10-CM

## 2024-05-03 LAB
ALBUMIN SERPL-MCNC: 4 G/DL (ref 3.5–5.2)
ALP SERPL-CCNC: 74 U/L (ref 40–129)
ALT SERPL-CCNC: 14 U/L (ref 0–40)
ANION GAP SERPL CALCULATED.3IONS-SCNC: 11 MMOL/L (ref 7–16)
ANION GAP SERPL CALCULATED.3IONS-SCNC: 18 MMOL/L (ref 7–16)
AST SERPL-CCNC: 32 U/L (ref 0–39)
BASOPHILS # BLD: 0.02 K/UL (ref 0–0.2)
BASOPHILS NFR BLD: 0 % (ref 0–2)
BILIRUB SERPL-MCNC: 0.2 MG/DL (ref 0–1.2)
BUN SERPL-MCNC: 33 MG/DL (ref 6–23)
BUN SERPL-MCNC: 34 MG/DL (ref 6–23)
CALCIUM SERPL-MCNC: 8.1 MG/DL (ref 8.6–10.2)
CALCIUM SERPL-MCNC: 8.3 MG/DL (ref 8.6–10.2)
CHLORIDE SERPL-SCNC: 103 MMOL/L (ref 98–107)
CHLORIDE SERPL-SCNC: 104 MMOL/L (ref 98–107)
CO2 SERPL-SCNC: 16 MMOL/L (ref 22–29)
CO2 SERPL-SCNC: 21 MMOL/L (ref 22–29)
CREAT SERPL-MCNC: 1.6 MG/DL (ref 0.7–1.2)
CREAT SERPL-MCNC: 1.8 MG/DL (ref 0.7–1.2)
EOSINOPHIL # BLD: 0.04 K/UL (ref 0.05–0.5)
EOSINOPHILS RELATIVE PERCENT: 1 % (ref 0–6)
ERYTHROCYTE [DISTWIDTH] IN BLOOD BY AUTOMATED COUNT: 13.8 % (ref 11.5–15)
GFR, ESTIMATED: 41 ML/MIN/1.73M2
GFR, ESTIMATED: 48 ML/MIN/1.73M2
GLUCOSE BLD-MCNC: 192 MG/DL (ref 74–99)
GLUCOSE BLD-MCNC: 75 MG/DL (ref 74–99)
GLUCOSE BLD-MCNC: 96 MG/DL (ref 74–99)
GLUCOSE SERPL-MCNC: 187 MG/DL (ref 74–99)
GLUCOSE SERPL-MCNC: 73 MG/DL (ref 74–99)
HCT VFR BLD AUTO: 39.5 % (ref 37–54)
HGB BLD-MCNC: 13 G/DL (ref 12.5–16.5)
IMM GRANULOCYTES # BLD AUTO: 0.07 K/UL (ref 0–0.58)
IMM GRANULOCYTES NFR BLD: 1 % (ref 0–5)
LACTATE BLDV-SCNC: 1.7 MMOL/L (ref 0.5–2.2)
LACTATE BLDV-SCNC: 4.3 MMOL/L (ref 0.5–2.2)
LYMPHOCYTES NFR BLD: 1.57 K/UL (ref 1.5–4)
LYMPHOCYTES RELATIVE PERCENT: 20 % (ref 20–42)
MCH RBC QN AUTO: 32.9 PG (ref 26–35)
MCHC RBC AUTO-ENTMCNC: 32.9 G/DL (ref 32–34.5)
MCV RBC AUTO: 100 FL (ref 80–99.9)
MONOCYTES NFR BLD: 0.74 K/UL (ref 0.1–0.95)
MONOCYTES NFR BLD: 9 % (ref 2–12)
NEUTROPHILS NFR BLD: 70 % (ref 43–80)
NEUTS SEG NFR BLD: 5.58 K/UL (ref 1.8–7.3)
PLATELET # BLD AUTO: 178 K/UL (ref 130–450)
PMV BLD AUTO: 9.9 FL (ref 7–12)
POTASSIUM SERPL-SCNC: 3.9 MMOL/L (ref 3.5–5)
POTASSIUM SERPL-SCNC: 4.4 MMOL/L (ref 3.5–5)
PROT SERPL-MCNC: 6.4 G/DL (ref 6.4–8.3)
RBC # BLD AUTO: 3.95 M/UL (ref 3.8–5.8)
SODIUM SERPL-SCNC: 136 MMOL/L (ref 132–146)
SODIUM SERPL-SCNC: 137 MMOL/L (ref 132–146)
WBC OTHER # BLD: 8 K/UL (ref 4.5–11.5)

## 2024-05-03 PROCEDURE — 2580000003 HC RX 258: Performed by: EMERGENCY MEDICINE

## 2024-05-03 PROCEDURE — 85025 COMPLETE CBC W/AUTO DIFF WBC: CPT

## 2024-05-03 PROCEDURE — 80053 COMPREHEN METABOLIC PANEL: CPT

## 2024-05-03 PROCEDURE — 99284 EMERGENCY DEPT VISIT MOD MDM: CPT

## 2024-05-03 PROCEDURE — 96361 HYDRATE IV INFUSION ADD-ON: CPT

## 2024-05-03 PROCEDURE — 6370000000 HC RX 637 (ALT 250 FOR IP): Performed by: EMERGENCY MEDICINE

## 2024-05-03 PROCEDURE — 80048 BASIC METABOLIC PNL TOTAL CA: CPT

## 2024-05-03 PROCEDURE — 96360 HYDRATION IV INFUSION INIT: CPT

## 2024-05-03 PROCEDURE — 83605 ASSAY OF LACTIC ACID: CPT

## 2024-05-03 PROCEDURE — 82962 GLUCOSE BLOOD TEST: CPT

## 2024-05-03 RX ORDER — SODIUM CHLORIDE, SODIUM LACTATE, POTASSIUM CHLORIDE, AND CALCIUM CHLORIDE .6; .31; .03; .02 G/100ML; G/100ML; G/100ML; G/100ML
1000 INJECTION, SOLUTION INTRAVENOUS ONCE
Status: COMPLETED | OUTPATIENT
Start: 2024-05-03 | End: 2024-05-03

## 2024-05-03 RX ORDER — LANOLIN ALCOHOL/MO/W.PET/CERES
100 CREAM (GRAM) TOPICAL DAILY
Status: DISCONTINUED | OUTPATIENT
Start: 2024-05-03 | End: 2024-05-04 | Stop reason: HOSPADM

## 2024-05-03 RX ORDER — 0.9 % SODIUM CHLORIDE 0.9 %
1000 INTRAVENOUS SOLUTION INTRAVENOUS ONCE
Status: COMPLETED | OUTPATIENT
Start: 2024-05-03 | End: 2024-05-03

## 2024-05-03 RX ADMIN — Medication 100 MG: at 19:50

## 2024-05-03 RX ADMIN — SODIUM CHLORIDE 1000 ML: 9 INJECTION, SOLUTION INTRAVENOUS at 18:28

## 2024-05-03 RX ADMIN — SODIUM CHLORIDE, POTASSIUM CHLORIDE, SODIUM LACTATE AND CALCIUM CHLORIDE 1000 ML: 600; 310; 30; 20 INJECTION, SOLUTION INTRAVENOUS at 19:51

## 2024-05-03 ASSESSMENT — PAIN - FUNCTIONAL ASSESSMENT: PAIN_FUNCTIONAL_ASSESSMENT: NONE - DENIES PAIN

## 2024-05-03 ASSESSMENT — LIFESTYLE VARIABLES
HOW OFTEN DO YOU HAVE A DRINK CONTAINING ALCOHOL: 4 OR MORE TIMES A WEEK
HOW MANY STANDARD DRINKS CONTAINING ALCOHOL DO YOU HAVE ON A TYPICAL DAY: 5 OR 6

## 2024-05-03 NOTE — ED NOTES
Patient presents to the ED from home via EMS for hypoglycemia. EMS said initial BGL was 35 on scene, pt slurring his words, they gave dextrose and recheck was 170. Patient was found to be hypotensive and EMS gave 400mL NS. Pt is A&Ox4 during triage. Pt states he did not eat today. He had orange juice for breakfast then drank 2 beers and 2 shots of whiskey. Pt states he usually drinks 6-8 beers daily with occasional shots of liquor. Pt states he has been out of his metformin but is unable to say for how long. BGL checked- 75, food tray provided. Call light in reach.

## 2024-05-03 NOTE — ED PROVIDER NOTES
HPI:  5/3/24, Time: 7:42 PM EDT         Dorian Davis is a 63 y.o. male presenting to the ED for presents by EMS blood sugar 35.  Upon arrival they gave him dextrose.  Patient has been out of metformin at home did not eat today only had some orange juice 2 beers and a salad.  He normally drinks 6-10 beers daily.  No seizures reported.  He is found to be hypotensive by EMS with a blood pressure of 80 systolic.  He did receive 400 mL of normal saline and was brought to the emergency department., beginning several hours ago.  The complaint has been persistent, moderate in severity, and worsened by nothing.  Patient was provided a food tray upon arrival.  He reports no fevers chills no nausea vomiting diarrhea.  No close contacts are ill.    Review of Systems:   A complete review of systems was performed and pertinent positives and negatives are stated within HPI, all other systems reviewed and are negative.    --------------------------------------------- PAST HISTORY ---------------------------------------------  Past Medical History:  has a past medical history of Diabetes mellitus (HCC), Hyperlipidemia, and Hypertension.    Past Surgical History:  has a past surgical history that includes hernia repair (2008 & 2013); Appendectomy; Arm Surgery (Left, 10/15/2021); and   picc powerpicc double (10/17/2021).    Social History:  reports that he quit smoking about 5 years ago. His smoking use included cigarettes. He has never used smokeless tobacco. He reports current alcohol use of about 4.0 standard drinks of alcohol per week. He reports that he does not use drugs.    Family History: family history is not on file.     The patient’s home medications have been reviewed.    Allergies: Patient has no known allergies.    -------------------------------------------------- RESULTS -------------------------------------------------  All laboratory and radiology results have been personally reviewed by myself  and IV fluids.  He only drank some orange juice today and had 2 beers and 2 shots of whiskey.  Patient stated he ate a salad.  No other food prior to arrival.  No seizures reported.    History From: Patient and EMS    CC/HPI Summary, DDx, ED Course, Reassessment, Tests Considered, Patient expectation:   Differential diagnosis includes but limited to sepsis, alcohol intoxication, hypoglycemia secondary to poor medical compliance in alcohol ingestion.    Social Determinants affecting Dx or Tx: none    Chronic Conditions: Alcohol dependence, hyperlipidemia, hypertension, diabetes mellitus, appendectomy.    Records Reviewed: Controlled substances monitoring: no signs of potential drug abuse or diversion identified.         I am the Primary Clinician of Record.    DISPOSITION: Patient was given a full meal upon arrival.  Labs were reviewed elevated lactic acid mild PAUL.  Patient was given IV fluids.  Repeat labs pending.  Anticipate discharge home.    ED COURSE:       Counseling:   The emergency provider has spoken with the patient and discussed today’s results, in addition to providing specific details for the plan of care and counseling regarding the diagnosis and prognosis.  Questions are answered at this time and they are agreeable with the plan.      --------------------------------- IMPRESSION AND DISPOSITION ---------------------------------    IMPRESSION  1. Alcohol abuse    2. Hypoglycemia    3. Dehydration    4. PAUL (acute kidney injury) (HCC)        DISPOSITION  Disposition: Discharge to home  Patient condition is stable      NOTE: This report was transcribed using voice recognition software. Every effort was made to ensure accuracy; however, inadvertent computerized transcription errors may be present       Anibal Ortiz DO  05/06/24 0978

## 2024-05-04 NOTE — DISCHARGE INSTRUCTIONS
Follow-up with family doctor.  Return for any significant worsening symptoms or other acute symptoms or concerns

## 2024-06-12 ENCOUNTER — OFFICE VISIT (OUTPATIENT)
Age: 64
End: 2024-06-12
Payer: COMMERCIAL

## 2024-06-12 VITALS
HEART RATE: 90 BPM | BODY MASS INDEX: 25.27 KG/M2 | TEMPERATURE: 98.6 F | SYSTOLIC BLOOD PRESSURE: 110 MMHG | HEIGHT: 67 IN | WEIGHT: 161 LBS | DIASTOLIC BLOOD PRESSURE: 64 MMHG | OXYGEN SATURATION: 96 %

## 2024-06-12 DIAGNOSIS — S39.012A STRAIN OF LUMBAR REGION, INITIAL ENCOUNTER: Primary | ICD-10-CM

## 2024-06-12 PROCEDURE — 1036F TOBACCO NON-USER: CPT | Performed by: FAMILY MEDICINE

## 2024-06-12 PROCEDURE — 3074F SYST BP LT 130 MM HG: CPT | Performed by: FAMILY MEDICINE

## 2024-06-12 PROCEDURE — 3078F DIAST BP <80 MM HG: CPT | Performed by: FAMILY MEDICINE

## 2024-06-12 PROCEDURE — G8419 CALC BMI OUT NRM PARAM NOF/U: HCPCS | Performed by: FAMILY MEDICINE

## 2024-06-12 PROCEDURE — 3017F COLORECTAL CA SCREEN DOC REV: CPT | Performed by: FAMILY MEDICINE

## 2024-06-12 PROCEDURE — G8427 DOCREV CUR MEDS BY ELIG CLIN: HCPCS | Performed by: FAMILY MEDICINE

## 2024-06-12 PROCEDURE — 99213 OFFICE O/P EST LOW 20 MIN: CPT | Performed by: FAMILY MEDICINE

## 2024-06-12 RX ORDER — TIZANIDINE 2 MG/1
2 TABLET ORAL 3 TIMES DAILY PRN
Qty: 30 TABLET | Refills: 0 | Status: SHIPPED | OUTPATIENT
Start: 2024-06-12

## 2024-06-12 RX ORDER — PREDNISONE 10 MG/1
TABLET ORAL
Qty: 30 TABLET | Refills: 0 | Status: SHIPPED | OUTPATIENT
Start: 2024-06-12 | End: 2024-06-22

## 2024-06-12 NOTE — PROGRESS NOTES
Dorian Davis is a 63 y.o. male accompanied by himself  CC:   Chief Complaint   Patient presents with    Lower Back Pain     Back pain after moving fridge onto jose x2 weeks taking ibuprofen       Low back pain:  Acute  Intermittent  Improving  No red flag symptoms  No weakness  His legs have not given  There was an incident where the patient was moving his fridge onto a jose.  Ibuprofen is helping  - Not very significant        Patient's past medical, surgical, social and/or family history reviewed, updated in chart, and are non-contributory (unless otherwise stated).  Medications and allergies also reviewed and updated in chart.      /64   Pulse 90   Temp 98.6 °F (37 °C)   Ht 1.702 m (5' 7\")   Wt 73 kg (161 lb)   SpO2 96%   BMI 25.22 kg/m²     Review of Systems   Constitutional:  Negative for chills, fatigue and fever.   HENT:  Negative for congestion, ear pain, facial swelling, rhinorrhea, sinus pressure and sinus pain.    Eyes:  Negative for photophobia and visual disturbance.   Respiratory:  Negative for apnea, cough, chest tightness and shortness of breath.    Cardiovascular:  Negative for chest pain and palpitations.   Gastrointestinal:  Negative for abdominal distention, blood in stool, constipation, diarrhea and vomiting.   Endocrine: Negative for cold intolerance, polydipsia, polyphagia and polyuria.   Genitourinary:  Negative for decreased urine volume, frequency and urgency.   Musculoskeletal:  Negative for arthralgias and gait problem.   Skin:  Negative for color change.   Allergic/Immunologic: Negative for environmental allergies and food allergies.   Neurological:  Negative for dizziness, light-headedness and headaches.   Hematological:  Negative for adenopathy.   Psychiatric/Behavioral:  Negative for agitation and confusion.        Physical Exam  Constitutional:       Appearance: Normal appearance.   HENT:      Head: Normocephalic and atraumatic.      Right Ear: Tympanic membrane

## 2024-06-17 ENCOUNTER — HOSPITAL ENCOUNTER (INPATIENT)
Age: 64
LOS: 3 days | Discharge: HOME | End: 2024-06-20
Attending: STUDENT IN AN ORGANIZED HEALTH CARE EDUCATION/TRAINING PROGRAM | Admitting: STUDENT IN AN ORGANIZED HEALTH CARE EDUCATION/TRAINING PROGRAM
Payer: COMMERCIAL

## 2024-06-17 ENCOUNTER — APPOINTMENT (OUTPATIENT)
Dept: GENERAL RADIOLOGY | Age: 64
End: 2024-06-17
Payer: COMMERCIAL

## 2024-06-17 ENCOUNTER — APPOINTMENT (OUTPATIENT)
Dept: CT IMAGING | Age: 64
End: 2024-06-17
Payer: COMMERCIAL

## 2024-06-17 DIAGNOSIS — F10.10 ALCOHOL ABUSE: ICD-10-CM

## 2024-06-17 DIAGNOSIS — R09.02 HYPOXEMIA: ICD-10-CM

## 2024-06-17 DIAGNOSIS — E16.2 HYPOGLYCEMIA: Primary | ICD-10-CM

## 2024-06-17 DIAGNOSIS — J44.1 COPD EXACERBATION (HCC): ICD-10-CM

## 2024-06-17 PROBLEM — J96.01 ACUTE RESPIRATORY FAILURE WITH HYPOXIA (HCC): Status: ACTIVE | Noted: 2024-06-17

## 2024-06-17 PROBLEM — E11.649 CONTROLLED TYPE 2 DIABETES MELLITUS WITH HYPOGLYCEMIA (HCC): Status: ACTIVE | Noted: 2024-06-17

## 2024-06-17 LAB
ALBUMIN SERPL-MCNC: 4.3 G/DL (ref 3.5–5.2)
ALP SERPL-CCNC: 115 U/L (ref 40–129)
ALT SERPL-CCNC: 30 U/L (ref 0–40)
AMPHET UR QL SCN: NEGATIVE
ANION GAP SERPL CALCULATED.3IONS-SCNC: 13 MMOL/L (ref 7–16)
APAP SERPL-MCNC: <5 UG/ML (ref 10–30)
AST SERPL-CCNC: 32 U/L (ref 0–39)
BARBITURATES UR QL SCN: NEGATIVE
BASOPHILS # BLD: 0.02 K/UL (ref 0–0.2)
BASOPHILS NFR BLD: 0 % (ref 0–2)
BENZODIAZ UR QL: POSITIVE
BILIRUB SERPL-MCNC: 0.2 MG/DL (ref 0–1.2)
BILIRUB UR QL STRIP: NEGATIVE
BUN SERPL-MCNC: 17 MG/DL (ref 6–23)
BUPRENORPHINE UR QL: NEGATIVE
CALCIUM SERPL-MCNC: 9.3 MG/DL (ref 8.6–10.2)
CANNABINOIDS UR QL SCN: NEGATIVE
CHLORIDE SERPL-SCNC: 102 MMOL/L (ref 98–107)
CK SERPL-CCNC: 149 U/L (ref 20–200)
CLARITY UR: CLEAR
CO2 SERPL-SCNC: 24 MMOL/L (ref 22–29)
COCAINE UR QL SCN: NEGATIVE
COLOR UR: YELLOW
CREAT SERPL-MCNC: 0.8 MG/DL (ref 0.7–1.2)
EOSINOPHIL # BLD: 0.02 K/UL (ref 0.05–0.5)
EOSINOPHILS RELATIVE PERCENT: 0 % (ref 0–6)
ERYTHROCYTE [DISTWIDTH] IN BLOOD BY AUTOMATED COUNT: 12.8 % (ref 11.5–15)
ETHANOLAMINE SERPL-MCNC: 31 MG/DL (ref 0–0.08)
FENTANYL UR QL: NEGATIVE
GFR, ESTIMATED: >90 ML/MIN/1.73M2
GLUCOSE BLD-MCNC: 126 MG/DL (ref 74–99)
GLUCOSE SERPL-MCNC: 84 MG/DL (ref 74–99)
GLUCOSE UR STRIP-MCNC: 100 MG/DL
HCT VFR BLD AUTO: 41.9 % (ref 37–54)
HGB BLD-MCNC: 13.6 G/DL (ref 12.5–16.5)
HGB UR QL STRIP.AUTO: NEGATIVE
IMM GRANULOCYTES # BLD AUTO: 0.1 K/UL (ref 0–0.58)
IMM GRANULOCYTES NFR BLD: 1 % (ref 0–5)
KETONES UR STRIP-MCNC: NEGATIVE MG/DL
LEUKOCYTE ESTERASE UR QL STRIP: NEGATIVE
LYMPHOCYTES NFR BLD: 0.95 K/UL (ref 1.5–4)
LYMPHOCYTES RELATIVE PERCENT: 9 % (ref 20–42)
MCH RBC QN AUTO: 32.5 PG (ref 26–35)
MCHC RBC AUTO-ENTMCNC: 32.5 G/DL (ref 32–34.5)
MCV RBC AUTO: 100.2 FL (ref 80–99.9)
METHADONE UR QL: NEGATIVE
MONOCYTES NFR BLD: 0.48 K/UL (ref 0.1–0.95)
MONOCYTES NFR BLD: 4 % (ref 2–12)
NEUTROPHILS NFR BLD: 86 % (ref 43–80)
NEUTS SEG NFR BLD: 9.56 K/UL (ref 1.8–7.3)
NITRITE UR QL STRIP: NEGATIVE
OPIATES UR QL SCN: NEGATIVE
PCP UR QL SCN: NEGATIVE
PH UR STRIP: 6 [PH] (ref 5–9)
PLATELET # BLD AUTO: 267 K/UL (ref 130–450)
PMV BLD AUTO: 8.8 FL (ref 7–12)
POTASSIUM SERPL-SCNC: 5.1 MMOL/L (ref 3.5–5)
PROT SERPL-MCNC: 7.4 G/DL (ref 6.4–8.3)
PROT UR STRIP-MCNC: 30 MG/DL
RBC # BLD AUTO: 4.18 M/UL (ref 3.8–5.8)
RBC #/AREA URNS HPF: ABNORMAL /HPF
SALICYLATES SERPL-MCNC: <0.3 MG/DL (ref 0–30)
SODIUM SERPL-SCNC: 139 MMOL/L (ref 132–146)
SP GR UR STRIP: 1.02 (ref 1–1.03)
TEST INFORMATION: ABNORMAL
TOXIC TRICYCLIC SC,BLOOD: NEGATIVE
TROPONIN I SERPL HS-MCNC: 14 NG/L (ref 0–11)
TROPONIN I SERPL HS-MCNC: 14 NG/L (ref 0–11)
UROBILINOGEN UR STRIP-ACNC: 0.2 EU/DL (ref 0–1)
WBC #/AREA URNS HPF: ABNORMAL /HPF
WBC OTHER # BLD: 11.1 K/UL (ref 4.5–11.5)

## 2024-06-17 PROCEDURE — 6370000000 HC RX 637 (ALT 250 FOR IP): Performed by: STUDENT IN AN ORGANIZED HEALTH CARE EDUCATION/TRAINING PROGRAM

## 2024-06-17 PROCEDURE — 80307 DRUG TEST PRSMV CHEM ANLYZR: CPT

## 2024-06-17 PROCEDURE — 96375 TX/PRO/DX INJ NEW DRUG ADDON: CPT

## 2024-06-17 PROCEDURE — 82550 ASSAY OF CK (CPK): CPT

## 2024-06-17 PROCEDURE — 82962 GLUCOSE BLOOD TEST: CPT

## 2024-06-17 PROCEDURE — 70450 CT HEAD/BRAIN W/O DYE: CPT

## 2024-06-17 PROCEDURE — 96374 THER/PROPH/DIAG INJ IV PUSH: CPT

## 2024-06-17 PROCEDURE — 71045 X-RAY EXAM CHEST 1 VIEW: CPT

## 2024-06-17 PROCEDURE — APPSS60 APP SPLIT SHARED TIME 46-60 MINUTES: Performed by: NURSE PRACTITIONER

## 2024-06-17 PROCEDURE — 80143 DRUG ASSAY ACETAMINOPHEN: CPT

## 2024-06-17 PROCEDURE — 84484 ASSAY OF TROPONIN QUANT: CPT

## 2024-06-17 PROCEDURE — 6370000000 HC RX 637 (ALT 250 FOR IP)

## 2024-06-17 PROCEDURE — 99285 EMERGENCY DEPT VISIT HI MDM: CPT

## 2024-06-17 PROCEDURE — G0480 DRUG TEST DEF 1-7 CLASSES: HCPCS

## 2024-06-17 PROCEDURE — 93005 ELECTROCARDIOGRAM TRACING: CPT | Performed by: EMERGENCY MEDICINE

## 2024-06-17 PROCEDURE — 81001 URINALYSIS AUTO W/SCOPE: CPT

## 2024-06-17 PROCEDURE — 1200000000 HC SEMI PRIVATE

## 2024-06-17 PROCEDURE — 6360000002 HC RX W HCPCS

## 2024-06-17 PROCEDURE — 85025 COMPLETE CBC W/AUTO DIFF WBC: CPT

## 2024-06-17 PROCEDURE — 94640 AIRWAY INHALATION TREATMENT: CPT

## 2024-06-17 PROCEDURE — 80179 DRUG ASSAY SALICYLATE: CPT

## 2024-06-17 PROCEDURE — 99223 1ST HOSP IP/OBS HIGH 75: CPT | Performed by: STUDENT IN AN ORGANIZED HEALTH CARE EDUCATION/TRAINING PROGRAM

## 2024-06-17 PROCEDURE — 80053 COMPREHEN METABOLIC PANEL: CPT

## 2024-06-17 RX ORDER — LORAZEPAM 1 MG/1
3 TABLET ORAL
Status: DISCONTINUED | OUTPATIENT
Start: 2024-06-17 | End: 2024-06-20 | Stop reason: HOSPADM

## 2024-06-17 RX ORDER — IPRATROPIUM BROMIDE AND ALBUTEROL SULFATE 2.5; .5 MG/3ML; MG/3ML
1 SOLUTION RESPIRATORY (INHALATION) ONCE
Status: COMPLETED | OUTPATIENT
Start: 2024-06-17 | End: 2024-06-17

## 2024-06-17 RX ORDER — GUAIFENESIN 600 MG/1
600 TABLET, EXTENDED RELEASE ORAL 2 TIMES DAILY
Status: DISCONTINUED | OUTPATIENT
Start: 2024-06-17 | End: 2024-06-17 | Stop reason: CLARIF

## 2024-06-17 RX ORDER — LORAZEPAM 1 MG/1
2 TABLET ORAL
Status: DISCONTINUED | OUTPATIENT
Start: 2024-06-17 | End: 2024-06-20 | Stop reason: HOSPADM

## 2024-06-17 RX ORDER — LORAZEPAM 2 MG/ML
3 INJECTION INTRAMUSCULAR
Status: DISCONTINUED | OUTPATIENT
Start: 2024-06-17 | End: 2024-06-20 | Stop reason: HOSPADM

## 2024-06-17 RX ORDER — ALBUTEROL SULFATE 2.5 MG/3ML
2.5 SOLUTION RESPIRATORY (INHALATION)
Status: DISCONTINUED | OUTPATIENT
Start: 2024-06-18 | End: 2024-06-20 | Stop reason: HOSPADM

## 2024-06-17 RX ORDER — PANTOPRAZOLE SODIUM 40 MG/1
40 TABLET, DELAYED RELEASE ORAL
Status: DISCONTINUED | OUTPATIENT
Start: 2024-06-18 | End: 2024-06-20 | Stop reason: HOSPADM

## 2024-06-17 RX ORDER — METHYLPREDNISOLONE SODIUM SUCCINATE 125 MG/2ML
60 INJECTION, POWDER, LYOPHILIZED, FOR SOLUTION INTRAMUSCULAR; INTRAVENOUS ONCE
Status: COMPLETED | OUTPATIENT
Start: 2024-06-17 | End: 2024-06-17

## 2024-06-17 RX ORDER — ALBUTEROL SULFATE 90 UG/1
2 AEROSOL, METERED RESPIRATORY (INHALATION) 4 TIMES DAILY PRN
Qty: 18 G | Refills: 0 | Status: SHIPPED | OUTPATIENT
Start: 2024-06-17

## 2024-06-17 RX ORDER — LORAZEPAM 1 MG/1
4 TABLET ORAL
Status: DISCONTINUED | OUTPATIENT
Start: 2024-06-17 | End: 2024-06-20 | Stop reason: HOSPADM

## 2024-06-17 RX ORDER — LORAZEPAM 1 MG/1
1 TABLET ORAL
Status: DISCONTINUED | OUTPATIENT
Start: 2024-06-17 | End: 2024-06-20 | Stop reason: HOSPADM

## 2024-06-17 RX ORDER — LANOLIN ALCOHOL/MO/W.PET/CERES
100 CREAM (GRAM) TOPICAL DAILY
Status: DISCONTINUED | OUTPATIENT
Start: 2024-06-17 | End: 2024-06-20 | Stop reason: HOSPADM

## 2024-06-17 RX ORDER — ONDANSETRON 4 MG/1
4 TABLET, ORALLY DISINTEGRATING ORAL EVERY 8 HOURS PRN
Status: DISCONTINUED | OUTPATIENT
Start: 2024-06-17 | End: 2024-06-20 | Stop reason: HOSPADM

## 2024-06-17 RX ORDER — METHYLPREDNISOLONE SODIUM SUCCINATE 125 MG/2ML
60 INJECTION, POWDER, LYOPHILIZED, FOR SOLUTION INTRAMUSCULAR; INTRAVENOUS DAILY
Status: COMPLETED | OUTPATIENT
Start: 2024-06-18 | End: 2024-06-19

## 2024-06-17 RX ORDER — GUAIFENESIN 400 MG/1
400 TABLET ORAL 3 TIMES DAILY
Status: DISCONTINUED | OUTPATIENT
Start: 2024-06-17 | End: 2024-06-20 | Stop reason: HOSPADM

## 2024-06-17 RX ORDER — LISINOPRIL 20 MG/1
20 TABLET ORAL DAILY
Status: DISCONTINUED | OUTPATIENT
Start: 2024-06-18 | End: 2024-06-20 | Stop reason: HOSPADM

## 2024-06-17 RX ORDER — ROSUVASTATIN CALCIUM 20 MG/1
40 TABLET, COATED ORAL DAILY
Status: DISCONTINUED | OUTPATIENT
Start: 2024-06-18 | End: 2024-06-20 | Stop reason: HOSPADM

## 2024-06-17 RX ORDER — PREDNISONE 20 MG/1
20 TABLET ORAL 2 TIMES DAILY
Qty: 10 TABLET | Refills: 0 | Status: SHIPPED | OUTPATIENT
Start: 2024-06-17 | End: 2024-06-22

## 2024-06-17 RX ORDER — SODIUM CHLORIDE 0.9 % (FLUSH) 0.9 %
5-40 SYRINGE (ML) INJECTION PRN
Status: DISCONTINUED | OUTPATIENT
Start: 2024-06-17 | End: 2024-06-20 | Stop reason: HOSPADM

## 2024-06-17 RX ORDER — PREDNISONE 20 MG/1
40 TABLET ORAL DAILY
Status: DISCONTINUED | OUTPATIENT
Start: 2024-06-20 | End: 2024-06-20 | Stop reason: HOSPADM

## 2024-06-17 RX ORDER — FOLIC ACID 1 MG/1
1 TABLET ORAL DAILY
Status: DISCONTINUED | OUTPATIENT
Start: 2024-06-18 | End: 2024-06-20 | Stop reason: HOSPADM

## 2024-06-17 RX ORDER — ENOXAPARIN SODIUM 100 MG/ML
40 INJECTION SUBCUTANEOUS DAILY
Status: DISCONTINUED | OUTPATIENT
Start: 2024-06-18 | End: 2024-06-20 | Stop reason: HOSPADM

## 2024-06-17 RX ORDER — LORAZEPAM 2 MG/ML
2 INJECTION INTRAMUSCULAR
Status: DISCONTINUED | OUTPATIENT
Start: 2024-06-17 | End: 2024-06-20 | Stop reason: HOSPADM

## 2024-06-17 RX ORDER — SERTRALINE HYDROCHLORIDE 100 MG/1
100 TABLET, FILM COATED ORAL NIGHTLY
Status: DISCONTINUED | OUTPATIENT
Start: 2024-06-17 | End: 2024-06-20 | Stop reason: HOSPADM

## 2024-06-17 RX ORDER — LORAZEPAM 2 MG/ML
1 INJECTION INTRAMUSCULAR
Status: DISCONTINUED | OUTPATIENT
Start: 2024-06-17 | End: 2024-06-20 | Stop reason: HOSPADM

## 2024-06-17 RX ORDER — ACETAMINOPHEN 325 MG/1
650 TABLET ORAL EVERY 6 HOURS PRN
Status: DISCONTINUED | OUTPATIENT
Start: 2024-06-17 | End: 2024-06-20 | Stop reason: HOSPADM

## 2024-06-17 RX ORDER — SODIUM CHLORIDE 9 MG/ML
INJECTION, SOLUTION INTRAVENOUS PRN
Status: DISCONTINUED | OUTPATIENT
Start: 2024-06-17 | End: 2024-06-20 | Stop reason: HOSPADM

## 2024-06-17 RX ORDER — AZITHROMYCIN 250 MG/1
500 TABLET, FILM COATED ORAL DAILY
Status: COMPLETED | OUTPATIENT
Start: 2024-06-18 | End: 2024-06-20

## 2024-06-17 RX ORDER — ONDANSETRON 2 MG/ML
4 INJECTION INTRAMUSCULAR; INTRAVENOUS EVERY 6 HOURS PRN
Status: DISCONTINUED | OUTPATIENT
Start: 2024-06-17 | End: 2024-06-20 | Stop reason: HOSPADM

## 2024-06-17 RX ORDER — SODIUM CHLORIDE 0.9 % (FLUSH) 0.9 %
5-40 SYRINGE (ML) INJECTION EVERY 12 HOURS SCHEDULED
Status: DISCONTINUED | OUTPATIENT
Start: 2024-06-17 | End: 2024-06-20 | Stop reason: HOSPADM

## 2024-06-17 RX ORDER — ASPIRIN 81 MG/1
81 TABLET, CHEWABLE ORAL DAILY
Status: DISCONTINUED | OUTPATIENT
Start: 2024-06-18 | End: 2024-06-20 | Stop reason: HOSPADM

## 2024-06-17 RX ORDER — DEXTROSE MONOHYDRATE AND SODIUM CHLORIDE 5; .9 G/100ML; G/100ML
INJECTION, SOLUTION INTRAVENOUS ONCE
Status: COMPLETED | OUTPATIENT
Start: 2024-06-18 | End: 2024-06-18

## 2024-06-17 RX ORDER — LORAZEPAM 2 MG/ML
4 INJECTION INTRAMUSCULAR
Status: DISCONTINUED | OUTPATIENT
Start: 2024-06-17 | End: 2024-06-20 | Stop reason: HOSPADM

## 2024-06-17 RX ORDER — ACETAMINOPHEN 650 MG/1
650 SUPPOSITORY RECTAL EVERY 6 HOURS PRN
Status: DISCONTINUED | OUTPATIENT
Start: 2024-06-17 | End: 2024-06-20 | Stop reason: HOSPADM

## 2024-06-17 RX ORDER — SPIRONOLACTONE 25 MG/1
25 TABLET ORAL DAILY
Status: DISCONTINUED | OUTPATIENT
Start: 2024-06-18 | End: 2024-06-20 | Stop reason: HOSPADM

## 2024-06-17 RX ORDER — POLYETHYLENE GLYCOL 3350 17 G/17G
17 POWDER, FOR SOLUTION ORAL DAILY PRN
Status: DISCONTINUED | OUTPATIENT
Start: 2024-06-17 | End: 2024-06-20 | Stop reason: HOSPADM

## 2024-06-17 RX ADMIN — IPRATROPIUM BROMIDE AND ALBUTEROL SULFATE 1 DOSE: 2.5; .5 SOLUTION RESPIRATORY (INHALATION) at 22:59

## 2024-06-17 RX ADMIN — Medication 100 MG: at 18:10

## 2024-06-17 RX ADMIN — METHYLPREDNISOLONE SODIUM SUCCINATE 60 MG: 125 INJECTION INTRAMUSCULAR; INTRAVENOUS at 18:09

## 2024-06-17 RX ADMIN — LORAZEPAM 1 MG: 2 INJECTION INTRAMUSCULAR; INTRAVENOUS at 23:12

## 2024-06-17 RX ADMIN — IPRATROPIUM BROMIDE AND ALBUTEROL SULFATE 1 DOSE: 2.5; .5 SOLUTION RESPIRATORY (INHALATION) at 17:42

## 2024-06-17 RX ADMIN — LORAZEPAM 2 MG: 2 INJECTION INTRAMUSCULAR; INTRAVENOUS at 18:57

## 2024-06-17 ASSESSMENT — PAIN - FUNCTIONAL ASSESSMENT: PAIN_FUNCTIONAL_ASSESSMENT: NONE - DENIES PAIN

## 2024-06-17 NOTE — ED PROVIDER NOTES
[facility] MED SURG/TELE  EMERGENCY DEPARTMENT ENCOUNTER        Pt Name: Dorian Davis  MRN: [id number]  Birthdate 1960  Date of evaluation: 6/17/2024  Provider: Mica Cummings MD  PCP: Liat Caro APRN - CNP  Note Started: 5:00 PM EDT 6/17/24    CHIEF COMPLAINT       Chief Complaint   Patient presents with    Hypoglycemia     Friend called for wellness check- found unresponsive- upon arrival ems bgl 44-combative upon moving to cot- meds given 5mg versed, D10 given in squad  Patient does not remember going to bed, does not remember waking uo  Does drink more than a 6 pack of beer daily  Lives alone       HISTORY OF PRESENT ILLNESS: 1 or more Elements   History From: Patient    Limitations to history : None    Dorian Davis is a 63 y.o. male with a past medical history of COPD, hyperlipidemia, hypertension, hyperlipidemia, diabetes presented to the emergency department via EMS for hypoglycemia.  Patient's friend called for wellness check, he was found unresponsive by EMS.  His blood glucose was 44 patient was found to be very combative hence was given 5 mg of Versed and D10 for the hyperglycemia.  Upon arrival to the ED patient was alert and oriented x 3 however could not remember the details of the events this morning.  States that he smokes cigarettes daily, and occasionally smokes marijuana.  Patient also drinks about 6-7 beers daily.  He denies using any other substances.  He denies having any chest pain, shortness of breath, abdominal pain, troubles urinating or moving his bowels.  He does have a history of COPD for which he takes some inhalers.    Nursing Notes were all reviewed and agreed with or any disagreements were addressed in the HPI.      REVIEW OF EXTERNAL NOTE :       Notes reviewed    REVIEW OF SYSTEMS :           Positives and Pertinent negatives as per HPI.     SURGICAL HISTORY     Past Surgical History:   Procedure Laterality Date    APPENDECTOMY      ARM SURGERY Left 10/15/2021

## 2024-06-17 NOTE — ED TRIAGE NOTES
Friend called for wellness check- found unresponsive- upon arrival ems bgl 44-combative upon moving to cot- meds given 5mg versed, D10 given in squad  Patient does not remember going to bed, does not remember waking uo  Does drink more than a 6 pack of beer daily  Lives alone

## 2024-06-18 PROBLEM — E16.2 HYPOGLYCEMIA: Status: ACTIVE | Noted: 2024-06-18

## 2024-06-18 LAB
ALBUMIN SERPL-MCNC: 4 G/DL (ref 3.5–5.2)
ALP SERPL-CCNC: 115 U/L (ref 40–129)
ALT SERPL-CCNC: 32 U/L (ref 0–40)
ANION GAP SERPL CALCULATED.3IONS-SCNC: 17 MMOL/L (ref 7–16)
AST SERPL-CCNC: 31 U/L (ref 0–39)
BASOPHILS # BLD: 0.01 K/UL (ref 0–0.2)
BASOPHILS NFR BLD: 0 % (ref 0–2)
BILIRUB SERPL-MCNC: 0.2 MG/DL (ref 0–1.2)
BUN SERPL-MCNC: 20 MG/DL (ref 6–23)
CALCIUM SERPL-MCNC: 9.1 MG/DL (ref 8.6–10.2)
CHLORIDE SERPL-SCNC: 94 MMOL/L (ref 98–107)
CO2 SERPL-SCNC: 19 MMOL/L (ref 22–29)
CREAT SERPL-MCNC: 0.8 MG/DL (ref 0.7–1.2)
EKG ATRIAL RATE: 88 BPM
EKG P AXIS: 70 DEGREES
EKG P-R INTERVAL: 148 MS
EKG Q-T INTERVAL: 364 MS
EKG QRS DURATION: 80 MS
EKG QTC CALCULATION (BAZETT): 440 MS
EKG R AXIS: -56 DEGREES
EKG T AXIS: 29 DEGREES
EKG VENTRICULAR RATE: 88 BPM
EOSINOPHIL # BLD: 0 K/UL (ref 0.05–0.5)
EOSINOPHILS RELATIVE PERCENT: 0 % (ref 0–6)
ERYTHROCYTE [DISTWIDTH] IN BLOOD BY AUTOMATED COUNT: 12.6 % (ref 11.5–15)
GFR, ESTIMATED: >90 ML/MIN/1.73M2
GLUCOSE BLD-MCNC: 170 MG/DL (ref 74–99)
GLUCOSE BLD-MCNC: 180 MG/DL (ref 74–99)
GLUCOSE BLD-MCNC: 183 MG/DL (ref 74–99)
GLUCOSE BLD-MCNC: 332 MG/DL (ref 74–99)
GLUCOSE BLD-MCNC: 427 MG/DL (ref 74–99)
GLUCOSE BLD-MCNC: 463 MG/DL (ref 74–99)
GLUCOSE BLD-MCNC: >500 MG/DL (ref 74–99)
GLUCOSE BLD-MCNC: >500 MG/DL (ref 74–99)
GLUCOSE SERPL-MCNC: 369 MG/DL (ref 74–99)
HCT VFR BLD AUTO: 39.9 % (ref 37–54)
HGB BLD-MCNC: 13 G/DL (ref 12.5–16.5)
IMM GRANULOCYTES # BLD AUTO: 0.12 K/UL (ref 0–0.58)
IMM GRANULOCYTES NFR BLD: 1 % (ref 0–5)
LYMPHOCYTES NFR BLD: 0.49 K/UL (ref 1.5–4)
LYMPHOCYTES RELATIVE PERCENT: 4 % (ref 20–42)
MCH RBC QN AUTO: 32.4 PG (ref 26–35)
MCHC RBC AUTO-ENTMCNC: 32.6 G/DL (ref 32–34.5)
MCV RBC AUTO: 99.5 FL (ref 80–99.9)
MONOCYTES NFR BLD: 0.82 K/UL (ref 0.1–0.95)
MONOCYTES NFR BLD: 7 % (ref 2–12)
NEUTROPHILS NFR BLD: 89 % (ref 43–80)
NEUTS SEG NFR BLD: 11.26 K/UL (ref 1.8–7.3)
PLATELET # BLD AUTO: 237 K/UL (ref 130–450)
PMV BLD AUTO: 9.2 FL (ref 7–12)
POTASSIUM SERPL-SCNC: 5.1 MMOL/L (ref 3.5–5)
PROT SERPL-MCNC: 7.2 G/DL (ref 6.4–8.3)
RBC # BLD AUTO: 4.01 M/UL (ref 3.8–5.8)
RBC # BLD: NORMAL 10*6/UL
SODIUM SERPL-SCNC: 130 MMOL/L (ref 132–146)
WBC OTHER # BLD: 12.7 K/UL (ref 4.5–11.5)

## 2024-06-18 PROCEDURE — 6360000002 HC RX W HCPCS: Performed by: NURSE PRACTITIONER

## 2024-06-18 PROCEDURE — 80053 COMPREHEN METABOLIC PANEL: CPT

## 2024-06-18 PROCEDURE — 82962 GLUCOSE BLOOD TEST: CPT

## 2024-06-18 PROCEDURE — 2580000003 HC RX 258: Performed by: STUDENT IN AN ORGANIZED HEALTH CARE EDUCATION/TRAINING PROGRAM

## 2024-06-18 PROCEDURE — 1200000000 HC SEMI PRIVATE

## 2024-06-18 PROCEDURE — 6370000000 HC RX 637 (ALT 250 FOR IP): Performed by: STUDENT IN AN ORGANIZED HEALTH CARE EDUCATION/TRAINING PROGRAM

## 2024-06-18 PROCEDURE — 99222 1ST HOSP IP/OBS MODERATE 55: CPT | Performed by: STUDENT IN AN ORGANIZED HEALTH CARE EDUCATION/TRAINING PROGRAM

## 2024-06-18 PROCEDURE — 36415 COLL VENOUS BLD VENIPUNCTURE: CPT

## 2024-06-18 PROCEDURE — 6370000000 HC RX 637 (ALT 250 FOR IP)

## 2024-06-18 PROCEDURE — 85025 COMPLETE CBC W/AUTO DIFF WBC: CPT

## 2024-06-18 PROCEDURE — 93010 ELECTROCARDIOGRAM REPORT: CPT | Performed by: INTERNAL MEDICINE

## 2024-06-18 PROCEDURE — 2580000003 HC RX 258: Performed by: NURSE PRACTITIONER

## 2024-06-18 PROCEDURE — 6360000002 HC RX W HCPCS

## 2024-06-18 PROCEDURE — 6370000000 HC RX 637 (ALT 250 FOR IP): Performed by: NURSE PRACTITIONER

## 2024-06-18 PROCEDURE — 94640 AIRWAY INHALATION TREATMENT: CPT

## 2024-06-18 RX ORDER — INSULIN LISPRO 100 [IU]/ML
0-4 INJECTION, SOLUTION INTRAVENOUS; SUBCUTANEOUS NIGHTLY
Status: DISCONTINUED | OUTPATIENT
Start: 2024-06-18 | End: 2024-06-18

## 2024-06-18 RX ORDER — GLUCAGON 1 MG/ML
1 KIT INJECTION PRN
Status: DISCONTINUED | OUTPATIENT
Start: 2024-06-18 | End: 2024-06-20 | Stop reason: HOSPADM

## 2024-06-18 RX ORDER — INSULIN LISPRO 100 [IU]/ML
0-4 INJECTION, SOLUTION INTRAVENOUS; SUBCUTANEOUS
Status: DISCONTINUED | OUTPATIENT
Start: 2024-06-18 | End: 2024-06-18

## 2024-06-18 RX ORDER — INSULIN LISPRO 100 [IU]/ML
0-8 INJECTION, SOLUTION INTRAVENOUS; SUBCUTANEOUS
Status: DISCONTINUED | OUTPATIENT
Start: 2024-06-18 | End: 2024-06-20 | Stop reason: HOSPADM

## 2024-06-18 RX ORDER — DEXTROSE MONOHYDRATE 100 MG/ML
INJECTION, SOLUTION INTRAVENOUS CONTINUOUS PRN
Status: DISCONTINUED | OUTPATIENT
Start: 2024-06-18 | End: 2024-06-20 | Stop reason: HOSPADM

## 2024-06-18 RX ORDER — INSULIN LISPRO 100 [IU]/ML
0-4 INJECTION, SOLUTION INTRAVENOUS; SUBCUTANEOUS NIGHTLY
Status: DISCONTINUED | OUTPATIENT
Start: 2024-06-18 | End: 2024-06-20 | Stop reason: HOSPADM

## 2024-06-18 RX ORDER — INSULIN LISPRO 100 [IU]/ML
10 INJECTION, SOLUTION INTRAVENOUS; SUBCUTANEOUS ONCE
Status: COMPLETED | OUTPATIENT
Start: 2024-06-18 | End: 2024-06-18

## 2024-06-18 RX ORDER — INSULIN LISPRO 100 [IU]/ML
3 INJECTION, SOLUTION INTRAVENOUS; SUBCUTANEOUS
Status: DISCONTINUED | OUTPATIENT
Start: 2024-06-18 | End: 2024-06-20 | Stop reason: HOSPADM

## 2024-06-18 RX ORDER — 0.9 % SODIUM CHLORIDE 0.9 %
1000 INTRAVENOUS SOLUTION INTRAVENOUS ONCE
Status: COMPLETED | OUTPATIENT
Start: 2024-06-18 | End: 2024-06-18

## 2024-06-18 RX ORDER — INSULIN GLARGINE 100 [IU]/ML
10 INJECTION, SOLUTION SUBCUTANEOUS NIGHTLY
Status: DISCONTINUED | OUTPATIENT
Start: 2024-06-18 | End: 2024-06-20 | Stop reason: HOSPADM

## 2024-06-18 RX ORDER — INSULIN LISPRO 100 [IU]/ML
4 INJECTION, SOLUTION INTRAVENOUS; SUBCUTANEOUS ONCE
Status: COMPLETED | OUTPATIENT
Start: 2024-06-18 | End: 2024-06-18

## 2024-06-18 RX ADMIN — FOLIC ACID 1 MG: 1 TABLET ORAL at 08:43

## 2024-06-18 RX ADMIN — ROSUVASTATIN CALCIUM 40 MG: 20 TABLET, FILM COATED ORAL at 22:05

## 2024-06-18 RX ADMIN — ASPIRIN 81 MG CHEWABLE TABLET 81 MG: 81 TABLET CHEWABLE at 08:43

## 2024-06-18 RX ADMIN — AZITHROMYCIN 500 MG: 250 TABLET, FILM COATED ORAL at 08:43

## 2024-06-18 RX ADMIN — GUAIFENESIN 400 MG: 400 TABLET ORAL at 22:05

## 2024-06-18 RX ADMIN — INSULIN LISPRO 10 UNITS: 100 INJECTION, SOLUTION INTRAVENOUS; SUBCUTANEOUS at 07:21

## 2024-06-18 RX ADMIN — LORAZEPAM 1 MG: 2 INJECTION INTRAMUSCULAR; INTRAVENOUS at 02:38

## 2024-06-18 RX ADMIN — INSULIN LISPRO 4 UNITS: 100 INJECTION, SOLUTION INTRAVENOUS; SUBCUTANEOUS at 04:07

## 2024-06-18 RX ADMIN — SODIUM CHLORIDE, PRESERVATIVE FREE 10 ML: 5 INJECTION INTRAVENOUS at 02:38

## 2024-06-18 RX ADMIN — INSULIN LISPRO 3 UNITS: 100 INJECTION, SOLUTION INTRAVENOUS; SUBCUTANEOUS at 11:27

## 2024-06-18 RX ADMIN — ALBUTEROL SULFATE 2.5 MG: 2.5 SOLUTION RESPIRATORY (INHALATION) at 16:45

## 2024-06-18 RX ADMIN — LORAZEPAM 1 MG: 2 INJECTION INTRAMUSCULAR; INTRAVENOUS at 16:25

## 2024-06-18 RX ADMIN — DEXTROSE AND SODIUM CHLORIDE: 5; 900 INJECTION, SOLUTION INTRAVENOUS at 02:25

## 2024-06-18 RX ADMIN — SODIUM CHLORIDE, PRESERVATIVE FREE 10 ML: 5 INJECTION INTRAVENOUS at 08:46

## 2024-06-18 RX ADMIN — METOPROLOL TARTRATE 25 MG: 25 TABLET, FILM COATED ORAL at 22:05

## 2024-06-18 RX ADMIN — GUAIFENESIN 400 MG: 400 TABLET ORAL at 08:43

## 2024-06-18 RX ADMIN — SPIRONOLACTONE 25 MG: 25 TABLET ORAL at 08:43

## 2024-06-18 RX ADMIN — ENOXAPARIN SODIUM 40 MG: 100 INJECTION SUBCUTANEOUS at 08:45

## 2024-06-18 RX ADMIN — GUAIFENESIN 400 MG: 400 TABLET ORAL at 16:22

## 2024-06-18 RX ADMIN — ALBUTEROL SULFATE 2.5 MG: 2.5 SOLUTION RESPIRATORY (INHALATION) at 20:46

## 2024-06-18 RX ADMIN — PANTOPRAZOLE SODIUM 40 MG: 40 TABLET, DELAYED RELEASE ORAL at 06:25

## 2024-06-18 RX ADMIN — LISINOPRIL 20 MG: 20 TABLET ORAL at 08:43

## 2024-06-18 RX ADMIN — METOPROLOL TARTRATE 25 MG: 25 TABLET, FILM COATED ORAL at 08:43

## 2024-06-18 RX ADMIN — SODIUM CHLORIDE 1000 ML: 9 INJECTION, SOLUTION INTRAVENOUS at 00:28

## 2024-06-18 RX ADMIN — INSULIN GLARGINE 10 UNITS: 100 INJECTION, SOLUTION SUBCUTANEOUS at 22:05

## 2024-06-18 RX ADMIN — ALBUTEROL SULFATE 2.5 MG: 2.5 SOLUTION RESPIRATORY (INHALATION) at 11:51

## 2024-06-18 RX ADMIN — SERTRALINE 100 MG: 100 TABLET, FILM COATED ORAL at 22:05

## 2024-06-18 RX ADMIN — ALBUTEROL SULFATE 2.5 MG: 2.5 SOLUTION RESPIRATORY (INHALATION) at 09:08

## 2024-06-18 RX ADMIN — METHYLPREDNISOLONE SODIUM SUCCINATE 60 MG: 125 INJECTION INTRAMUSCULAR; INTRAVENOUS at 08:43

## 2024-06-18 RX ADMIN — LORAZEPAM 2 MG: 2 INJECTION INTRAMUSCULAR; INTRAVENOUS at 00:16

## 2024-06-18 RX ADMIN — Medication 100 MG: at 08:43

## 2024-06-18 RX ADMIN — SODIUM CHLORIDE, PRESERVATIVE FREE 10 ML: 5 INJECTION INTRAVENOUS at 22:09

## 2024-06-18 RX ADMIN — INSULIN LISPRO 4 UNITS: 100 INJECTION, SOLUTION INTRAVENOUS; SUBCUTANEOUS at 08:47

## 2024-06-18 ASSESSMENT — PAIN SCALES - GENERAL: PAINLEVEL_OUTOF10: 0

## 2024-06-18 NOTE — PROGRESS NOTES
4 Eyes Skin Assessment     NAME:  Dorian Davis  YOB: 1960  MEDICAL RECORD NUMBER:  [id number]    The patient is being assessed for  Admission    I agree that at least one RN has performed a thorough Head to Toe Skin Assessment on the patient. ALL assessment sites listed below have been assessed.      Areas assessed by both nurses:    Head, Face, Ears, Shoulders, Back, Chest, Arms, Elbows, Hands, Sacrum. Buttock, Coccyx, Ischium, Legs. Feet and Heels, and Under Medical Devices         Does the Patient have a Wound? No noted wound(s)       Demetris Prevention initiated by RN: No  Wound Care Orders initiated by RN: No    Pressure Injury (Stage 3,4, Unstageable, DTI, NWPT, and Complex wounds) if present, place Wound referral order by RN under : No    New Ostomies, if present place, Ostomy referral order under : No     Nurse 1 eSignature: Electronically signed by GIUSEPPE HENRY RN on 6/18/24 at 3:19 AM EDT    **SHARE this note so that the co-signing nurse can place an eSignature**    Nurse 2 eSignature: Electronically signed by Marilyn Bansal RN on 6/18/24 at 3:20 AM EDT

## 2024-06-18 NOTE — PLAN OF CARE
Problem: Safety - Adult  Goal: Free from fall injury  6/18/2024 1101 by Mignon Infante, RN  Outcome: Progressing  6/18/2024 0239 by Luz Taylor, RN  Outcome: Progressing     Problem: ABCDS Injury Assessment  Goal: Absence of physical injury  6/18/2024 0239 by Luz Taylor, RN  Outcome: Progressing     Problem: Pain  Goal: Verbalizes/displays adequate comfort level or baseline comfort level  Outcome: Progressing

## 2024-06-18 NOTE — CARE COORDINATION
6/18/2024 Social Work Discharge Planning:COPD.Pt was found unresponsive at home and brought by EMS. This worker met with Pt to discuss  role and transition of care/discharge planning. Pt used to reside with his brother but now lives alone in and apartment. Pt states independence with no DME. Room air. Alc abuse. SW offered Peer Recovery and Pt declined. Pharmacy is [facility] in [place] and PCP is Dr. JOSIANE Caro. Electronically signed by HI Hester on 6/18/2024 at 12:28 PM

## 2024-06-18 NOTE — ED NOTES
ED to Inpatient Handoff Report    Notified Zulema that electronic handoff available and patient ready for transport to room 0538.    Safety Risks: None identified    Patient in Restraints: no    Constant Observer or Patient : no    Telemetry Monitoring Ordered :Yes           Order to transfer to unit without monitor:NO    Last MEWS: 2 Time completed: 0105    Deterioration Index Score:   Predictive Model Details          25 (Normal)  Factor Value    Calculated 6/18/2024 01:05 43% Age 63 years old    Deterioration Index Model 21% Potassium abnormal (5.1 mmol/L)     13% Pulse 104     10% Respiratory rate 18     8% WBC count 11.1 k/uL     4% Systolic 124     2% Pulse oximetry 94 %     0% Temperature 98.6 °F (37 °C)     0% Hematocrit 41.9 %     0% Sodium 139 mmol/L        Vitals:    06/17/24 2304 06/18/24 0011 06/18/24 0016 06/18/24 0105   BP: (!) 140/86 121/80 121/80 124/77   Pulse: (!) 121 (!) 128 (!) 128 (!) 104   Resp: 16 18 18   Temp: 98.5 °F (36.9 °C)  98.6 °F (37 °C) 98.6 °F (37 °C)   TempSrc: Oral  Oral Oral   SpO2: 95%  94% 94%         Opportunity for questions and clarification was provided.

## 2024-06-18 NOTE — H&P
[facility] Hospitalist Group History and Physical      CHIEF COMPLAINT:  Found unresponsive     History of Present Illness:  This is a 63 year old male with PMH significant for alcohol abuse, alcoholic hepatitis, COPD, DM, HLD, HTN, and PSVT.  Patient found on the floor unresponsive by landlord.  Patient does not remember anything other than the ambulance ride.  Does report falling and hurting his back yesterday morning.  States that he initially injured it while moving a fridge several weeks ago.  Evaluated by a physician at the walk-in clinic in [facility] seen on 6/12/2024.  Patient reports being prescribed muscle relaxer and pain medication.  Does admit to drinking 6 beers a day and also consumes 1 L of liquor over several days.  Reports that his last drink was today before coming in.  Also admits to smoking 1 pack of cigarettes per day since he was 12.  Denies any and all other symptoms including recent illness, fever, chills, chest pain, abdominal pain, and changes in bowel/bladder habits.  Does report shortness of breath but states that he is always short of breath.  Lives alone.  Does admit to drinking alcohol with muscle relaxer and pain pills.    Potassium 5.1, troponin 14 and 14.  Chest x-ray negative for acute processes.  CT of the head negative for acute process.  Positive for benzos and urine.  Blood sugar 44 per EMS.  Now 126 and 84.  Will observe for additional treatment and evaluation.    Informant(s) for H&P: Patient and chart review    REVIEW OF SYSTEMS:  A comprehensive review of systems was negative except for: what is in the HPI      PMH:  Past Medical History:   Diagnosis Date    Diabetes mellitus (HCC)     Hyperlipidemia     Hypertension        Surgical History:  Past Surgical History:   Procedure Laterality Date    APPENDECTOMY      ARM SURGERY Left 10/15/2021    LEFT ELBOW INCISION AND DRAINAGE performed by Garfield Croft MD at [facility] OR      PIC POWERPICC DOUBLE  10/17/2021

## 2024-06-18 NOTE — PROGRESS NOTES
[facility] Hospitalist Progress Note    Admitting Date and Time: 6/17/2024  4:51 PM  Admit Dx: Hypoxemia [R09.02]  Alcohol abuse [F10.10]  Hypoglycemia [E16.2]  COPD exacerbation (HCC) [J44.1]    Subjective:  Patient is being followed for Hypoxemia [R09.02]  Alcohol abuse [F10.10]  Hypoglycemia [E16.2]  COPD exacerbation (HCC) [J44.1]     No acute events overnight.  Patient is breathing comfortably on room air however his blood glucose is uncontrolled today so his insulin regimen was adjusted.  Patient needs endocrinology and dietitian follow-up upon discharge.      ROS: denies fever, chills, cp, sob, n/v, HA unless stated above.      insulin lispro  0-4 Units SubCUTAneous TID WC    insulin lispro  0-4 Units SubCUTAneous Nightly    sodium chloride flush  5-40 mL IntraVENous 2 times per day    thiamine  100 mg Oral Daily    sodium chloride flush  5-40 mL IntraVENous 2 times per day    enoxaparin  40 mg SubCUTAneous Daily    azithromycin  500 mg Oral Daily    albuterol  2.5 mg Nebulization Q4H WA RT    methylPREDNISolone  60 mg IntraVENous Daily    Followed by    [START ON 6/20/2024] predniSONE  40 mg Oral Daily    aspirin  81 mg Oral Daily    folic acid  1 mg Oral Daily    lisinopril  20 mg Oral Daily    metoprolol tartrate  25 mg Oral BID    pantoprazole  40 mg Oral QAM AC    rosuvastatin  40 mg Oral Daily    sertraline  100 mg Oral Nightly    spironolactone  25 mg Oral Daily    guaiFENesin  400 mg Oral TID     sodium chloride flush, 5-40 mL, PRN  sodium chloride, , PRN  LORazepam, 1 mg, Q1H PRN   Or  LORazepam, 1 mg, Q1H PRN   Or  LORazepam, 2 mg, Q1H PRN   Or  LORazepam, 2 mg, Q1H PRN   Or  LORazepam, 3 mg, Q1H PRN   Or  LORazepam, 3 mg, Q1H PRN   Or  LORazepam, 4 mg, Q1H PRN   Or  LORazepam, 4 mg, Q1H PRN  sodium chloride flush, 5-40 mL, PRN  sodium chloride, , PRN  ondansetron, 4 mg, Q8H PRN   Or  ondansetron, 4 mg, Q6H PRN  polyethylene glycol, 17 g, Daily PRN  acetaminophen, 650 mg, Q6H PRN    Or  acetaminophen, 650 mg, Q6H PRN         Objective:    /74   Pulse (!) 120   Temp 98.4 °F (36.9 °C) (Oral)   Resp 18   Ht 1.702 m (5' 7\")   Wt 72.9 kg (160 lb 11.2 oz)   SpO2 95%   BMI 25.17 kg/m²     General Appearance: alert and oriented to person, place and time and in no acute distress  Skin: warm and dry  Head: normocephalic and atraumatic  Eyes: pupils equal, round, and reactive to light, extraocular eye movements intact, conjunctivae normal  Neck: neck supple and non tender without mass   Pulmonary/Chest: clear to auscultation bilaterally- no wheezes, rales or rhonchi, normal air movement, no respiratory distress  Cardiovascular: normal rate, normal S1 and S2 and no carotid bruits  Abdomen: soft, non-tender, non-distended, normal bowel sounds, no masses or organomegaly  Extremities: no cyanosis, no clubbing and no edema  Neurologic: no cranial nerve deficit and speech normal        Recent Labs     06/17/24  1720      K 5.1*      CO2 24   BUN 17   CREATININE 0.8   GLUCOSE 84   CALCIUM 9.3       Recent Labs     06/17/24  1720   WBC 11.1   RBC 4.18   HGB 13.6   HCT 41.9   .2*   MCH 32.5   MCHC 32.5   RDW 12.8      MPV 8.8       Radiology:     CT HEAD WO CONTRAST   Final Result   No acute intracranial abnormality.           XR CHEST PORTABLE   Final Result   No acute process.       Assessment and Plan:    Principal Problem:    COPD exacerbation (HCC)  Active Problems:    Alcohol abuse    Controlled type 2 diabetes mellitus with hypoglycemia (HCC)    Acute respiratory failure with hypoxia (HCC)    Hypoglycemia  Resolved Problems:    * No resolved hospital problems. *    Acute hypoxemic respiratory failure  Acute COPD exacerbation  Nicotine dependence  -Supplemental oxygen and wean as tolerated   -Continue bronchodilators and prednisone    Alcohol use disorder  -Follow [facility] protocol     Chronic type 2 diabetes  Uncontrolled hyperglycemia  -Continue basal bolus and sliding

## 2024-06-18 NOTE — PLAN OF CARE
Problem: Discharge Planning  Goal: Discharge to home or other facility with appropriate resources  Outcome: Progressing  Flowsheets (Taken 6/18/2024 0218 by Davonte Pabon, RN)  Discharge to home or other facility with appropriate resources:   Identify barriers to discharge with patient and caregiver   Arrange for needed discharge resources and transportation as appropriate   Refer to discharge planning if patient needs post-hospital services based on physician order or complex needs related to functional status, cognitive ability or social support system     Problem: Safety - Adult  Goal: Free from fall injury  Outcome: Progressing     Problem: ABCDS Injury Assessment  Goal: Absence of physical injury  Outcome: Progressing

## 2024-06-19 LAB
ALBUMIN SERPL-MCNC: 3.7 G/DL (ref 3.5–5.2)
ALP SERPL-CCNC: 96 U/L (ref 40–129)
ALT SERPL-CCNC: 52 U/L (ref 0–40)
ANION GAP SERPL CALCULATED.3IONS-SCNC: 10 MMOL/L (ref 7–16)
AST SERPL-CCNC: 80 U/L (ref 0–39)
BASOPHILS # BLD: 0.03 K/UL (ref 0–0.2)
BASOPHILS NFR BLD: 0 % (ref 0–2)
BILIRUB SERPL-MCNC: 0.2 MG/DL (ref 0–1.2)
BUN SERPL-MCNC: 20 MG/DL (ref 6–23)
CALCIUM SERPL-MCNC: 8.8 MG/DL (ref 8.6–10.2)
CHLORIDE SERPL-SCNC: 103 MMOL/L (ref 98–107)
CO2 SERPL-SCNC: 26 MMOL/L (ref 22–29)
CREAT SERPL-MCNC: 0.7 MG/DL (ref 0.7–1.2)
EOSINOPHIL # BLD: 0.02 K/UL (ref 0.05–0.5)
EOSINOPHILS RELATIVE PERCENT: 0 % (ref 0–6)
ERYTHROCYTE [DISTWIDTH] IN BLOOD BY AUTOMATED COUNT: 12.9 % (ref 11.5–15)
GFR, ESTIMATED: >90 ML/MIN/1.73M2
GLUCOSE BLD-MCNC: 109 MG/DL (ref 74–99)
GLUCOSE BLD-MCNC: 129 MG/DL (ref 74–99)
GLUCOSE BLD-MCNC: 214 MG/DL (ref 74–99)
GLUCOSE BLD-MCNC: 89 MG/DL (ref 74–99)
GLUCOSE SERPL-MCNC: 136 MG/DL (ref 74–99)
HCT VFR BLD AUTO: 36.4 % (ref 37–54)
HGB BLD-MCNC: 12 G/DL (ref 12.5–16.5)
IMM GRANULOCYTES # BLD AUTO: 0.04 K/UL (ref 0–0.58)
IMM GRANULOCYTES NFR BLD: 0 % (ref 0–5)
LYMPHOCYTES NFR BLD: 3.67 K/UL (ref 1.5–4)
LYMPHOCYTES RELATIVE PERCENT: 32 % (ref 20–42)
MCH RBC QN AUTO: 32.3 PG (ref 26–35)
MCHC RBC AUTO-ENTMCNC: 33 G/DL (ref 32–34.5)
MCV RBC AUTO: 97.8 FL (ref 80–99.9)
MONOCYTES NFR BLD: 0.98 K/UL (ref 0.1–0.95)
MONOCYTES NFR BLD: 9 % (ref 2–12)
NEUTROPHILS NFR BLD: 59 % (ref 43–80)
NEUTS SEG NFR BLD: 6.75 K/UL (ref 1.8–7.3)
PLATELET # BLD AUTO: 211 K/UL (ref 130–450)
PMV BLD AUTO: 9.5 FL (ref 7–12)
POTASSIUM SERPL-SCNC: 4.5 MMOL/L (ref 3.5–5)
PROT SERPL-MCNC: 6.1 G/DL (ref 6.4–8.3)
RBC # BLD AUTO: 3.72 M/UL (ref 3.8–5.8)
SODIUM SERPL-SCNC: 139 MMOL/L (ref 132–146)
WBC OTHER # BLD: 11.5 K/UL (ref 4.5–11.5)

## 2024-06-19 PROCEDURE — 6360000002 HC RX W HCPCS: Performed by: NURSE PRACTITIONER

## 2024-06-19 PROCEDURE — 94640 AIRWAY INHALATION TREATMENT: CPT

## 2024-06-19 PROCEDURE — 99232 SBSQ HOSP IP/OBS MODERATE 35: CPT | Performed by: STUDENT IN AN ORGANIZED HEALTH CARE EDUCATION/TRAINING PROGRAM

## 2024-06-19 PROCEDURE — 1200000000 HC SEMI PRIVATE

## 2024-06-19 PROCEDURE — 6360000002 HC RX W HCPCS

## 2024-06-19 PROCEDURE — 80053 COMPREHEN METABOLIC PANEL: CPT

## 2024-06-19 PROCEDURE — 6370000000 HC RX 637 (ALT 250 FOR IP): Performed by: STUDENT IN AN ORGANIZED HEALTH CARE EDUCATION/TRAINING PROGRAM

## 2024-06-19 PROCEDURE — 2580000003 HC RX 258: Performed by: NURSE PRACTITIONER

## 2024-06-19 PROCEDURE — 85025 COMPLETE CBC W/AUTO DIFF WBC: CPT

## 2024-06-19 PROCEDURE — 6370000000 HC RX 637 (ALT 250 FOR IP)

## 2024-06-19 PROCEDURE — 82962 GLUCOSE BLOOD TEST: CPT

## 2024-06-19 PROCEDURE — 6370000000 HC RX 637 (ALT 250 FOR IP): Performed by: NURSE PRACTITIONER

## 2024-06-19 RX ADMIN — Medication 100 MG: at 08:43

## 2024-06-19 RX ADMIN — METOPROLOL TARTRATE 25 MG: 25 TABLET, FILM COATED ORAL at 20:06

## 2024-06-19 RX ADMIN — LISINOPRIL 20 MG: 20 TABLET ORAL at 08:43

## 2024-06-19 RX ADMIN — LORAZEPAM 3 MG: 2 INJECTION INTRAMUSCULAR; INTRAVENOUS at 14:47

## 2024-06-19 RX ADMIN — SPIRONOLACTONE 25 MG: 25 TABLET ORAL at 08:43

## 2024-06-19 RX ADMIN — GUAIFENESIN 400 MG: 400 TABLET ORAL at 08:43

## 2024-06-19 RX ADMIN — ASPIRIN 81 MG CHEWABLE TABLET 81 MG: 81 TABLET CHEWABLE at 08:43

## 2024-06-19 RX ADMIN — GUAIFENESIN 400 MG: 400 TABLET ORAL at 14:31

## 2024-06-19 RX ADMIN — ALBUTEROL SULFATE 2.5 MG: 2.5 SOLUTION RESPIRATORY (INHALATION) at 12:13

## 2024-06-19 RX ADMIN — INSULIN LISPRO 3 UNITS: 100 INJECTION, SOLUTION INTRAVENOUS; SUBCUTANEOUS at 08:45

## 2024-06-19 RX ADMIN — PANTOPRAZOLE SODIUM 40 MG: 40 TABLET, DELAYED RELEASE ORAL at 05:46

## 2024-06-19 RX ADMIN — METHYLPREDNISOLONE SODIUM SUCCINATE 60 MG: 125 INJECTION INTRAMUSCULAR; INTRAVENOUS at 08:43

## 2024-06-19 RX ADMIN — ROSUVASTATIN CALCIUM 40 MG: 20 TABLET, FILM COATED ORAL at 20:08

## 2024-06-19 RX ADMIN — SERTRALINE 100 MG: 100 TABLET, FILM COATED ORAL at 20:08

## 2024-06-19 RX ADMIN — AZITHROMYCIN 500 MG: 250 TABLET, FILM COATED ORAL at 08:43

## 2024-06-19 RX ADMIN — ALBUTEROL SULFATE 2.5 MG: 2.5 SOLUTION RESPIRATORY (INHALATION) at 15:55

## 2024-06-19 RX ADMIN — GUAIFENESIN 400 MG: 400 TABLET ORAL at 20:08

## 2024-06-19 RX ADMIN — FOLIC ACID 1 MG: 1 TABLET ORAL at 08:43

## 2024-06-19 RX ADMIN — SODIUM CHLORIDE, PRESERVATIVE FREE 10 ML: 5 INJECTION INTRAVENOUS at 20:08

## 2024-06-19 RX ADMIN — ALBUTEROL SULFATE 2.5 MG: 2.5 SOLUTION RESPIRATORY (INHALATION) at 08:37

## 2024-06-19 RX ADMIN — LORAZEPAM 2 MG: 1 TABLET ORAL at 08:50

## 2024-06-19 RX ADMIN — INSULIN GLARGINE 10 UNITS: 100 INJECTION, SOLUTION SUBCUTANEOUS at 20:08

## 2024-06-19 RX ADMIN — ENOXAPARIN SODIUM 40 MG: 100 INJECTION SUBCUTANEOUS at 08:43

## 2024-06-19 RX ADMIN — SODIUM CHLORIDE, PRESERVATIVE FREE 10 ML: 5 INJECTION INTRAVENOUS at 08:43

## 2024-06-19 RX ADMIN — METOPROLOL TARTRATE 25 MG: 25 TABLET, FILM COATED ORAL at 08:43

## 2024-06-19 ASSESSMENT — PAIN SCALES - GENERAL: PAINLEVEL_OUTOF10: 0

## 2024-06-19 NOTE — PLAN OF CARE
Problem: Safety - Adult  Goal: Free from fall injury  6/19/2024 1117 by Mignon Infante RN  Outcome: Progressing  6/18/2024 2235 by Lashonda Salazar RN  Outcome: Progressing     Problem: ABCDS Injury Assessment  Goal: Absence of physical injury  6/19/2024 1117 by Mignon Infante RN  Outcome: Progressing  6/18/2024 2235 by Lashonda Salazar, RN  Outcome: Progressing     Problem: Pain  Goal: Verbalizes/displays adequate comfort level or baseline comfort level  6/19/2024 1117 by Mignon Infante RN  Outcome: Progressing  6/18/2024 2235 by Lashonda Salazar RN  Outcome: Progressing

## 2024-06-19 NOTE — CARE COORDINATION
6/19/2024  Social Work Discharge Planning:COPD. Alcohol abuse.Plan is home with no needs at this time. Pt used to reside with his brother but now lives alone in and apartment. Pt states independence with no DME. Room air. SW offered Peer Recovery and Pt declined . Electronically signed by HI Hester on 6/19/2024 at 8:04 AM

## 2024-06-19 NOTE — PLAN OF CARE
Problem: Discharge Planning  Goal: Discharge to home or other facility with appropriate resources  Outcome: Progressing     Problem: Safety - Adult  Goal: Free from fall injury  6/18/2024 2235 by Lashonda Salazar, RN  Outcome: Progressing     Problem: ABCDS Injury Assessment  Goal: Absence of physical injury  Outcome: Progressing     Problem: Pain  Goal: Verbalizes/displays adequate comfort level or baseline comfort level  6/18/2024 2235 by Lashonda Salazar, RN  Outcome: Progressing

## 2024-06-19 NOTE — PROGRESS NOTES
[facility] Hospitalist Progress Note    Admitting Date and Time: 6/17/2024  4:51 PM  Admit Dx: Hypoxemia [R09.02]  Alcohol abuse [F10.10]  Hypoglycemia [E16.2]  COPD exacerbation (HCC) [J44.1]    Subjective:  Patient is being followed for Hypoxemia [R09.02]  Alcohol abuse [F10.10]  Hypoglycemia [E16.2]  COPD exacerbation (HCC) [J44.1]   Pt feels better, anxiety improved. Feels sleeping ok  Per RN:     ROS: denies fever, chills, cp, sob, n/v, HA unless stated above. CIWA 6, B.P still borderline elevated.      insulin glargine  10 Units SubCUTAneous Nightly    insulin lispro  3 Units SubCUTAneous TID WC    insulin lispro  0-8 Units SubCUTAneous TID WC    insulin lispro  0-4 Units SubCUTAneous Nightly    sodium chloride flush  5-40 mL IntraVENous 2 times per day    thiamine  100 mg Oral Daily    sodium chloride flush  5-40 mL IntraVENous 2 times per day    enoxaparin  40 mg SubCUTAneous Daily    azithromycin  500 mg Oral Daily    albuterol  2.5 mg Nebulization Q4H WA RT    [START ON 6/20/2024] predniSONE  40 mg Oral Daily    aspirin  81 mg Oral Daily    folic acid  1 mg Oral Daily    lisinopril  20 mg Oral Daily    metoprolol tartrate  25 mg Oral BID    pantoprazole  40 mg Oral QAM AC    rosuvastatin  40 mg Oral Daily    sertraline  100 mg Oral Nightly    spironolactone  25 mg Oral Daily    guaiFENesin  400 mg Oral TID     glucose, 4 tablet, PRN  dextrose bolus, 125 mL, PRN   Or  dextrose bolus, 250 mL, PRN  glucagon (rDNA), 1 mg, PRN  dextrose, , Continuous PRN  sodium chloride flush, 5-40 mL, PRN  sodium chloride, , PRN  LORazepam, 1 mg, Q1H PRN   Or  LORazepam, 1 mg, Q1H PRN   Or  LORazepam, 2 mg, Q1H PRN   Or  LORazepam, 2 mg, Q1H PRN   Or  LORazepam, 3 mg, Q1H PRN   Or  LORazepam, 3 mg, Q1H PRN   Or  LORazepam, 4 mg, Q1H PRN   Or  LORazepam, 4 mg, Q1H PRN  sodium chloride flush, 5-40 mL, PRN  sodium chloride, , PRN  ondansetron, 4 mg, Q8H PRN   Or  ondansetron, 4 mg, Q6H PRN  polyethylene glycol, 17 g,

## 2024-06-20 VITALS
SYSTOLIC BLOOD PRESSURE: 114 MMHG | TEMPERATURE: 98.4 F | HEIGHT: 67 IN | HEART RATE: 84 BPM | WEIGHT: 157 LBS | BODY MASS INDEX: 24.64 KG/M2 | RESPIRATION RATE: 16 BRPM | DIASTOLIC BLOOD PRESSURE: 88 MMHG | OXYGEN SATURATION: 97 %

## 2024-06-20 LAB
ALBUMIN SERPL-MCNC: 3.8 G/DL (ref 3.5–5.2)
ALP SERPL-CCNC: 102 U/L (ref 40–129)
ALT SERPL-CCNC: 88 U/L (ref 0–40)
ANION GAP SERPL CALCULATED.3IONS-SCNC: 11 MMOL/L (ref 7–16)
AST SERPL-CCNC: 79 U/L (ref 0–39)
BASOPHILS # BLD: 0.04 K/UL (ref 0–0.2)
BASOPHILS NFR BLD: 0 % (ref 0–2)
BILIRUB SERPL-MCNC: 0.4 MG/DL (ref 0–1.2)
BUN SERPL-MCNC: 16 MG/DL (ref 6–23)
CALCIUM SERPL-MCNC: 8.7 MG/DL (ref 8.6–10.2)
CHLORIDE SERPL-SCNC: 99 MMOL/L (ref 98–107)
CO2 SERPL-SCNC: 24 MMOL/L (ref 22–29)
CREAT SERPL-MCNC: 0.7 MG/DL (ref 0.7–1.2)
EOSINOPHIL # BLD: 0.1 K/UL (ref 0.05–0.5)
EOSINOPHILS RELATIVE PERCENT: 1 % (ref 0–6)
ERYTHROCYTE [DISTWIDTH] IN BLOOD BY AUTOMATED COUNT: 12.5 % (ref 11.5–15)
GFR, ESTIMATED: >90 ML/MIN/1.73M2
GLUCOSE BLD-MCNC: 113 MG/DL (ref 74–99)
GLUCOSE BLD-MCNC: 120 MG/DL (ref 74–99)
GLUCOSE BLD-MCNC: 258 MG/DL (ref 74–99)
GLUCOSE SERPL-MCNC: 116 MG/DL (ref 74–99)
HCT VFR BLD AUTO: 37.9 % (ref 37–54)
HGB BLD-MCNC: 12.5 G/DL (ref 12.5–16.5)
IMM GRANULOCYTES # BLD AUTO: 0.07 K/UL (ref 0–0.58)
IMM GRANULOCYTES NFR BLD: 1 % (ref 0–5)
LYMPHOCYTES NFR BLD: 3.43 K/UL (ref 1.5–4)
LYMPHOCYTES RELATIVE PERCENT: 32 % (ref 20–42)
MCH RBC QN AUTO: 31.7 PG (ref 26–35)
MCHC RBC AUTO-ENTMCNC: 33 G/DL (ref 32–34.5)
MCV RBC AUTO: 96.2 FL (ref 80–99.9)
MONOCYTES NFR BLD: 0.74 K/UL (ref 0.1–0.95)
MONOCYTES NFR BLD: 7 % (ref 2–12)
NEUTROPHILS NFR BLD: 60 % (ref 43–80)
NEUTS SEG NFR BLD: 6.45 K/UL (ref 1.8–7.3)
PLATELET # BLD AUTO: 214 K/UL (ref 130–450)
PMV BLD AUTO: 9.3 FL (ref 7–12)
POTASSIUM SERPL-SCNC: 4.6 MMOL/L (ref 3.5–5)
PROT SERPL-MCNC: 6.1 G/DL (ref 6.4–8.3)
RBC # BLD AUTO: 3.94 M/UL (ref 3.8–5.8)
SODIUM SERPL-SCNC: 134 MMOL/L (ref 132–146)
WBC OTHER # BLD: 10.8 K/UL (ref 4.5–11.5)

## 2024-06-20 PROCEDURE — 6360000002 HC RX W HCPCS: Performed by: NURSE PRACTITIONER

## 2024-06-20 PROCEDURE — 6370000000 HC RX 637 (ALT 250 FOR IP): Performed by: STUDENT IN AN ORGANIZED HEALTH CARE EDUCATION/TRAINING PROGRAM

## 2024-06-20 PROCEDURE — 85025 COMPLETE CBC W/AUTO DIFF WBC: CPT

## 2024-06-20 PROCEDURE — 99239 HOSP IP/OBS DSCHRG MGMT >30: CPT | Performed by: STUDENT IN AN ORGANIZED HEALTH CARE EDUCATION/TRAINING PROGRAM

## 2024-06-20 PROCEDURE — 2580000003 HC RX 258: Performed by: NURSE PRACTITIONER

## 2024-06-20 PROCEDURE — 82962 GLUCOSE BLOOD TEST: CPT

## 2024-06-20 PROCEDURE — 6370000000 HC RX 637 (ALT 250 FOR IP)

## 2024-06-20 PROCEDURE — 6370000000 HC RX 637 (ALT 250 FOR IP): Performed by: NURSE PRACTITIONER

## 2024-06-20 PROCEDURE — 94640 AIRWAY INHALATION TREATMENT: CPT

## 2024-06-20 PROCEDURE — 80053 COMPREHEN METABOLIC PANEL: CPT

## 2024-06-20 RX ADMIN — INSULIN LISPRO 3 UNITS: 100 INJECTION, SOLUTION INTRAVENOUS; SUBCUTANEOUS at 11:13

## 2024-06-20 RX ADMIN — INSULIN LISPRO 4 UNITS: 100 INJECTION, SOLUTION INTRAVENOUS; SUBCUTANEOUS at 16:25

## 2024-06-20 RX ADMIN — INSULIN LISPRO 3 UNITS: 100 INJECTION, SOLUTION INTRAVENOUS; SUBCUTANEOUS at 16:26

## 2024-06-20 RX ADMIN — AZITHROMYCIN 500 MG: 250 TABLET, FILM COATED ORAL at 09:00

## 2024-06-20 RX ADMIN — PANTOPRAZOLE SODIUM 40 MG: 40 TABLET, DELAYED RELEASE ORAL at 05:27

## 2024-06-20 RX ADMIN — GUAIFENESIN 400 MG: 400 TABLET ORAL at 14:34

## 2024-06-20 RX ADMIN — METOPROLOL TARTRATE 25 MG: 25 TABLET, FILM COATED ORAL at 08:59

## 2024-06-20 RX ADMIN — GUAIFENESIN 400 MG: 400 TABLET ORAL at 08:59

## 2024-06-20 RX ADMIN — PREDNISONE 40 MG: 20 TABLET ORAL at 08:59

## 2024-06-20 RX ADMIN — FOLIC ACID 1 MG: 1 TABLET ORAL at 08:59

## 2024-06-20 RX ADMIN — SPIRONOLACTONE 25 MG: 25 TABLET ORAL at 08:59

## 2024-06-20 RX ADMIN — LISINOPRIL 20 MG: 20 TABLET ORAL at 09:00

## 2024-06-20 RX ADMIN — SODIUM CHLORIDE, PRESERVATIVE FREE 10 ML: 5 INJECTION INTRAVENOUS at 09:03

## 2024-06-20 RX ADMIN — ASPIRIN 81 MG CHEWABLE TABLET 81 MG: 81 TABLET CHEWABLE at 09:00

## 2024-06-20 RX ADMIN — INSULIN LISPRO 3 UNITS: 100 INJECTION, SOLUTION INTRAVENOUS; SUBCUTANEOUS at 09:02

## 2024-06-20 RX ADMIN — ALBUTEROL SULFATE 2.5 MG: 2.5 SOLUTION RESPIRATORY (INHALATION) at 09:43

## 2024-06-20 RX ADMIN — ENOXAPARIN SODIUM 40 MG: 100 INJECTION SUBCUTANEOUS at 09:00

## 2024-06-20 RX ADMIN — Medication 100 MG: at 08:59

## 2024-06-20 ASSESSMENT — PAIN SCALES - GENERAL
PAINLEVEL_OUTOF10: 0
PAINLEVEL_OUTOF10: 0

## 2024-06-20 NOTE — PLAN OF CARE
Problem: Discharge Planning  Goal: Discharge to home or other facility with appropriate resources  6/20/2024 1037 by Ambreen Bartlett, RN  Outcome: Progressing  Flowsheets (Taken 6/20/2024 0836)  Discharge to home or other facility with appropriate resources: Identify barriers to discharge with patient and caregiver  6/20/2024 0303 by Lashonda Salazar, RN  Outcome: Progressing     Problem: Safety - Adult  Goal: Free from fall injury  6/20/2024 1037 by Ambreen Bartlett, RN  Outcome: Progressing  6/20/2024 0303 by Lashonda Salazar, RN  Outcome: Progressing     Problem: ABCDS Injury Assessment  Goal: Absence of physical injury  6/20/2024 1037 by Ambreen Bartlett, RN  Outcome: Progressing  6/20/2024 0303 by Lashonda Salazar, RN  Outcome: Progressing

## 2024-06-20 NOTE — DISCHARGE SUMMARY
stroke\" or \"stroke alert\" been called?->No FINDINGS: BRAIN/VENTRICLES: There is no acute intracranial hemorrhage, mass effect or midline shift.  No abnormal extra-axial fluid collection.  The gray-white differentiation is maintained without evidence of an acute infarct.  There is no evidence of hydrocephalus. ORBITS: The visualized portion of the orbits demonstrate no acute abnormality. SINUSES: The visualized paranasal sinuses and mastoid air cells demonstrate no acute abnormality. SOFT TISSUES/SKULL:  No acute abnormality of the visualized skull or soft tissues.     No acute intracranial abnormality.     XR CHEST PORTABLE    Result Date: 6/17/2024  EXAMINATION: ONE XRAY VIEW OF THE CHEST 6/17/2024 5:15 pm COMPARISON: December 9, 2023 HISTORY: ORDERING SYSTEM PROVIDED HISTORY: wheezing TECHNOLOGIST PROVIDED HISTORY: Reason for exam:->wheezing FINDINGS: The lungs are without acute focal process.  There is no effusion or pneumothorax. The cardiomediastinal silhouette is without acute process. The osseous structures are without acute process.  Prominent central pulmonary vessels, no change.     No acute process.       Patient Instructions:      Medication List        START taking these medications      albuterol sulfate  (90 Base) MCG/ACT inhaler  Commonly known as: Ventolin HFA  Inhale 2 puffs into the lungs 4 times daily as needed for Wheezing            CHANGE how you take these medications      * predniSONE 10 MG tablet  Commonly known as: DELTASONE  5 tablets by mouth once a day x 2 days, then 4 tablets by mouth once a day x 2 days, then 3 tablets by mouth once a day x 2 days, then 2 tablets by mouth once a day x 2 days, then 1 tablets by mouth once a day x 2 days then stop.  What changed: Another medication with the same name was added. Make sure you understand how and when to take each.     * predniSONE 20 MG tablet  Commonly known as: DELTASONE  Take 1 tablet by mouth 2 times daily for 5 days  What  changed: You were already taking a medication with the same name, and this prescription was added. Make sure you understand how and when to take each.           * This list has 2 medication(s) that are the same as other medications prescribed for you. Read the directions carefully, and ask your doctor or other care provider to review them with you.                CONTINUE taking these medications      aspirin 81 MG chewable tablet  Take 1 tablet by mouth daily     folic acid 1 MG tablet  Commonly known as: FOLVITE  Take 1 tablet by mouth daily     glipiZIDE 10 MG tablet  Commonly known as: GLUCOTROL  Take 1 tablet by mouth 3 times daily (before meals)     lisinopril 20 MG tablet  Commonly known as: PRINIVIL;ZESTRIL  Take 1 tablet by mouth daily     magnesium oxide 400 (240 Mg) MG tablet  Commonly known as: MAG-OX  Take 1 tablet by mouth daily for 10 days     metFORMIN 1000 MG tablet  Commonly known as: GLUCOPHAGE  Take 1 tablet by mouth 2 times daily (with meals)     metoprolol tartrate 25 MG tablet  Commonly known as: LOPRESSOR  Take 1 tablet by mouth 2 times daily     nicotine 14 MG/24HR  Commonly known as: NICODERM CQ  Place 1 patch onto the skin daily     omeprazole 40 MG delayed release capsule  Commonly known as: PRILOSEC  Take 1 capsule by mouth daily     rosuvastatin 40 MG tablet  Commonly known as: CRESTOR  Take 1 tablet by mouth daily     sertraline 100 MG tablet  Commonly known as: ZOLOFT  Take 1 tablet by mouth nightly Take 1 and a 1/2 tablets by mouth every evening     spironolactone 25 MG tablet  Commonly known as: ALDACTONE  Take 1 tablet by mouth daily     thiamine 100 MG tablet  Take 1 tablet by mouth daily     tiZANidine 2 MG tablet  Commonly known as: ZANAFLEX  Take 1 tablet by mouth 3 times daily as needed (pain)               Where to Get Your Medications        These medications were sent to [id number] - [place], [place] - [address] - P [phone] -  [phone]  [address] [place] [address]

## 2024-06-20 NOTE — CARE COORDINATION
6/20/2024  Social Work Discharge Planning:Cont. CIWA protocol. COPD.Alcohol abuse.Plan is home with no needs at this time. Pt used to reside with his brother but now lives alone in and apartment. Pt states independence with no DME. Room air. SW offered Peer Recovery and Pt declined .Electronically signed by HI Hester on 6/20/2024 at 8:22 AM

## 2024-06-20 NOTE — DISCHARGE INSTRUCTIONS
Taper your prednisone (steroid).   Take 40mg daily for 3-4 days, THEN 20mg daily for 3-4 days, then 10mg daily for 3-4 days

## 2024-06-20 NOTE — PROGRESS NOTES
[facility] Hospitalist Progress Note    Admitting Date and Time: 6/17/2024  4:51 PM  Admit Dx: Hypoxemia [R09.02]  Alcohol abuse [F10.10]  Hypoglycemia [E16.2]  COPD exacerbation (HCC) [J44.1]    Subjective:  Patient is being followed for Hypoxemia [R09.02]  Alcohol abuse [F10.10]  Hypoglycemia [E16.2]  COPD exacerbation (HCC) [J44.1]   Pt feels okay  Per RN: no additional concerns    ROS: denies fever, chills, cp, sob, n/v, HA unless otherwise noted above     insulin glargine  10 Units SubCUTAneous Nightly    insulin lispro  3 Units SubCUTAneous TID WC    insulin lispro  0-8 Units SubCUTAneous TID WC    insulin lispro  0-4 Units SubCUTAneous Nightly    sodium chloride flush  5-40 mL IntraVENous 2 times per day    thiamine  100 mg Oral Daily    sodium chloride flush  5-40 mL IntraVENous 2 times per day    enoxaparin  40 mg SubCUTAneous Daily    albuterol  2.5 mg Nebulization Q4H WA RT    predniSONE  40 mg Oral Daily    aspirin  81 mg Oral Daily    folic acid  1 mg Oral Daily    lisinopril  20 mg Oral Daily    metoprolol tartrate  25 mg Oral BID    pantoprazole  40 mg Oral QAM AC    rosuvastatin  40 mg Oral Daily    sertraline  100 mg Oral Nightly    spironolactone  25 mg Oral Daily    guaiFENesin  400 mg Oral TID     glucose, 4 tablet, PRN  dextrose bolus, 125 mL, PRN   Or  dextrose bolus, 250 mL, PRN  glucagon (rDNA), 1 mg, PRN  dextrose, , Continuous PRN  sodium chloride flush, 5-40 mL, PRN  sodium chloride, , PRN  LORazepam, 1 mg, Q1H PRN   Or  LORazepam, 1 mg, Q1H PRN   Or  LORazepam, 2 mg, Q1H PRN   Or  LORazepam, 2 mg, Q1H PRN   Or  LORazepam, 3 mg, Q1H PRN   Or  LORazepam, 3 mg, Q1H PRN   Or  LORazepam, 4 mg, Q1H PRN   Or  LORazepam, 4 mg, Q1H PRN  sodium chloride flush, 5-40 mL, PRN  sodium chloride, , PRN  ondansetron, 4 mg, Q8H PRN   Or  ondansetron, 4 mg, Q6H PRN  polyethylene glycol, 17 g, Daily PRN  acetaminophen, 650 mg, Q6H PRN   Or  acetaminophen, 650 mg, Q6H PRN         Objective:  BP

## 2024-06-25 ASSESSMENT — ENCOUNTER SYMPTOMS
CONSTIPATION: 0
BLOOD IN STOOL: 0
COUGH: 0
RHINORRHEA: 0
COLOR CHANGE: 0
SINUS PAIN: 0
APNEA: 0
CHEST TIGHTNESS: 0
VOMITING: 0
SHORTNESS OF BREATH: 0
PHOTOPHOBIA: 0
SINUS PRESSURE: 0
ABDOMINAL DISTENTION: 0
DIARRHEA: 0
FACIAL SWELLING: 0

## 2024-06-25 NOTE — PROGRESS NOTES
Physician Progress Note      PATIENT:               EMILY RANDHAWA  [facility] #:                  [id number]  :                       1960  ADMIT DATE:       2024 4:51 PM  DISCH DATE:        2024 4:29 PM  RESPONDING  PROVIDER #:        Larissa Junior MD          QUERY TEXT:    Patient was admitted with Acute COPD exacerbation. Noted documentation of   Acute hypoxemic respiratory failure in H&P note on  and DS note . In   order to support the diagnosis of acute respiratory failure, please include   additional clinical indicators in your documentation.  Or please document if   the diagnosis of acute respiratory failure has been ruled out after further   study.    The medical record reflects the following:  Risk Factors: COPD exacerbation, Age 63 M  Clinical Indicators:  H&P noted \"COPD exacerbation-breathing treatments   with DuoNeb, Solu-Medrol, patient to keep saturation more than 92%, will   follow and his on oxygen by nasal cannula at 2 L, uses oxygen at home at 2 to   3 L. Acute hypoxic respiratory failure-secondary to #1\".   DS noted - Acute hypoxemic respiratory failure and supplemental oxygen   and wean as tolerated, Continue bronchodilators and prednisone.   PN noted - Pulmonary/Chest: clear to auscultation bilaterally- no   wheezes, rales or rhonchi, normal air movement and no respiratory distress.  SPO2- 90% - 97%  RR - 13 - 20  Room Air.  Treatment: Room Air, nasal cannula at 2 L, uses oxygen at home at 2 to 3 L.    [facility].    Acute Respiratory Failure Clinical Indicators per 3M MS-DRG Training Guide and   Quick Reference Guide:  pO2 < 60 mmHg or SpO2 (pulse oximetry) < 91% breathing room air  pCO2 > 50 and pH < 7.35  P/F ratio (pO2 / FIO2) < 300  pO2 decrease or pCO2 increase by 10 mmHg from baseline (if known)  Supplemental oxygen of 40% or more  Presence of respiratory distress, tachypnea, dyspnea, shortness of breath,   wheezing  Unable to

## 2024-07-01 RX ORDER — TIZANIDINE 2 MG/1
2 TABLET ORAL 3 TIMES DAILY PRN
Qty: 30 TABLET | Refills: 0 | OUTPATIENT
Start: 2024-07-01

## 2024-10-23 ENCOUNTER — APPOINTMENT (OUTPATIENT)
Dept: GENERAL RADIOLOGY | Age: 64
End: 2024-10-23
Payer: COMMERCIAL

## 2024-10-23 ENCOUNTER — APPOINTMENT (OUTPATIENT)
Dept: ULTRASOUND IMAGING | Age: 64
End: 2024-10-23
Payer: COMMERCIAL

## 2024-10-23 ENCOUNTER — HOSPITAL ENCOUNTER (EMERGENCY)
Age: 64
Discharge: HOME | End: 2024-10-23
Attending: STUDENT IN AN ORGANIZED HEALTH CARE EDUCATION/TRAINING PROGRAM
Payer: COMMERCIAL

## 2024-10-23 VITALS
SYSTOLIC BLOOD PRESSURE: 108 MMHG | DIASTOLIC BLOOD PRESSURE: 74 MMHG | RESPIRATION RATE: 23 BRPM | HEART RATE: 96 BPM | OXYGEN SATURATION: 88 % | WEIGHT: 152 LBS | BODY MASS INDEX: 23.86 KG/M2 | TEMPERATURE: 97.6 F | HEIGHT: 67 IN

## 2024-10-23 DIAGNOSIS — M79.671 BILATERAL FOOT PAIN: Primary | ICD-10-CM

## 2024-10-23 DIAGNOSIS — M79.672 BILATERAL FOOT PAIN: Primary | ICD-10-CM

## 2024-10-23 DIAGNOSIS — E87.6 HYPOKALEMIA: ICD-10-CM

## 2024-10-23 DIAGNOSIS — E83.42 HYPOMAGNESEMIA: ICD-10-CM

## 2024-10-23 LAB
ALBUMIN SERPL-MCNC: 3.7 G/DL (ref 3.5–5.2)
ALP SERPL-CCNC: 100 U/L (ref 40–129)
ALT SERPL-CCNC: 34 U/L (ref 0–40)
ANION GAP SERPL CALCULATED.3IONS-SCNC: 21 MMOL/L (ref 7–16)
AST SERPL-CCNC: 60 U/L (ref 0–39)
BASOPHILS # BLD: 0.03 K/UL (ref 0–0.2)
BASOPHILS NFR BLD: 0 % (ref 0–2)
BILIRUB SERPL-MCNC: 0.6 MG/DL (ref 0–1.2)
BNP SERPL-MCNC: 742 PG/ML (ref 0–125)
BUN SERPL-MCNC: 9 MG/DL (ref 6–23)
CALCIUM SERPL-MCNC: 8 MG/DL (ref 8.6–10.2)
CHLORIDE SERPL-SCNC: 98 MMOL/L (ref 98–107)
CO2 SERPL-SCNC: 18 MMOL/L (ref 22–29)
CREAT SERPL-MCNC: 0.6 MG/DL (ref 0.7–1.2)
ECHO BSA: 1.81 M2
EOSINOPHIL # BLD: 0.09 K/UL (ref 0.05–0.5)
EOSINOPHILS RELATIVE PERCENT: 1 % (ref 0–6)
ERYTHROCYTE [DISTWIDTH] IN BLOOD BY AUTOMATED COUNT: 12.6 % (ref 11.5–15)
GFR, ESTIMATED: >90 ML/MIN/1.73M2
GLUCOSE BLD-MCNC: 161 MG/DL (ref 74–99)
GLUCOSE SERPL-MCNC: 172 MG/DL (ref 74–99)
HCT VFR BLD AUTO: 40.6 % (ref 37–54)
HGB BLD-MCNC: 13.9 G/DL (ref 12.5–16.5)
IMM GRANULOCYTES # BLD AUTO: 0.03 K/UL (ref 0–0.58)
IMM GRANULOCYTES NFR BLD: 0 % (ref 0–5)
LYMPHOCYTES NFR BLD: 1.89 K/UL (ref 1.5–4)
LYMPHOCYTES RELATIVE PERCENT: 21 % (ref 20–42)
MAGNESIUM SERPL-MCNC: 1.1 MG/DL (ref 1.6–2.6)
MCH RBC QN AUTO: 32.3 PG (ref 26–35)
MCHC RBC AUTO-ENTMCNC: 34.2 G/DL (ref 32–34.5)
MCV RBC AUTO: 94.2 FL (ref 80–99.9)
MONOCYTES NFR BLD: 0.94 K/UL (ref 0.1–0.95)
MONOCYTES NFR BLD: 11 % (ref 2–12)
NEUTROPHILS NFR BLD: 67 % (ref 43–80)
NEUTS SEG NFR BLD: 5.95 K/UL (ref 1.8–7.3)
PLATELET # BLD AUTO: 99 K/UL (ref 130–450)
PLATELET CONFIRMATION: NORMAL
PMV BLD AUTO: 9.4 FL (ref 7–12)
POTASSIUM SERPL-SCNC: 2.8 MMOL/L (ref 3.5–5)
POTASSIUM SERPL-SCNC: 3.6 MMOL/L (ref 3.5–5)
PROT SERPL-MCNC: 6.1 G/DL (ref 6.4–8.3)
RBC # BLD AUTO: 4.31 M/UL (ref 3.8–5.8)
SODIUM SERPL-SCNC: 137 MMOL/L (ref 132–146)
TROPONIN I SERPL HS-MCNC: 23 NG/L (ref 0–11)
TROPONIN I SERPL HS-MCNC: 23 NG/L (ref 0–11)
WBC OTHER # BLD: 8.9 K/UL (ref 4.5–11.5)

## 2024-10-23 PROCEDURE — 97161 PT EVAL LOW COMPLEX 20 MIN: CPT

## 2024-10-23 PROCEDURE — 97165 OT EVAL LOW COMPLEX 30 MIN: CPT

## 2024-10-23 PROCEDURE — 99285 EMERGENCY DEPT VISIT HI MDM: CPT

## 2024-10-23 PROCEDURE — 84484 ASSAY OF TROPONIN QUANT: CPT

## 2024-10-23 PROCEDURE — 96365 THER/PROPH/DIAG IV INF INIT: CPT

## 2024-10-23 PROCEDURE — 93970 EXTREMITY STUDY: CPT

## 2024-10-23 PROCEDURE — 83735 ASSAY OF MAGNESIUM: CPT

## 2024-10-23 PROCEDURE — 83880 ASSAY OF NATRIURETIC PEPTIDE: CPT

## 2024-10-23 PROCEDURE — 71045 X-RAY EXAM CHEST 1 VIEW: CPT

## 2024-10-23 PROCEDURE — 82962 GLUCOSE BLOOD TEST: CPT

## 2024-10-23 PROCEDURE — 6360000002 HC RX W HCPCS: Performed by: STUDENT IN AN ORGANIZED HEALTH CARE EDUCATION/TRAINING PROGRAM

## 2024-10-23 PROCEDURE — 6370000000 HC RX 637 (ALT 250 FOR IP): Performed by: STUDENT IN AN ORGANIZED HEALTH CARE EDUCATION/TRAINING PROGRAM

## 2024-10-23 PROCEDURE — 93005 ELECTROCARDIOGRAM TRACING: CPT | Performed by: STUDENT IN AN ORGANIZED HEALTH CARE EDUCATION/TRAINING PROGRAM

## 2024-10-23 PROCEDURE — 96366 THER/PROPH/DIAG IV INF ADDON: CPT

## 2024-10-23 PROCEDURE — 85025 COMPLETE CBC W/AUTO DIFF WBC: CPT

## 2024-10-23 PROCEDURE — 80053 COMPREHEN METABOLIC PANEL: CPT

## 2024-10-23 PROCEDURE — 84132 ASSAY OF SERUM POTASSIUM: CPT

## 2024-10-23 PROCEDURE — 6370000000 HC RX 637 (ALT 250 FOR IP): Performed by: EMERGENCY MEDICINE

## 2024-10-23 RX ORDER — LANOLIN ALCOHOL/MO/W.PET/CERES
400 CREAM (GRAM) TOPICAL ONCE
Status: DISCONTINUED | OUTPATIENT
Start: 2024-10-23 | End: 2024-10-23

## 2024-10-23 RX ORDER — POTASSIUM CHLORIDE 1500 MG/1
40 TABLET, EXTENDED RELEASE ORAL ONCE
Status: COMPLETED | OUTPATIENT
Start: 2024-10-23 | End: 2024-10-23

## 2024-10-23 RX ORDER — METOPROLOL TARTRATE 25 MG/1
25 TABLET, FILM COATED ORAL ONCE
Status: COMPLETED | OUTPATIENT
Start: 2024-10-23 | End: 2024-10-23

## 2024-10-23 RX ORDER — MAGNESIUM SULFATE IN WATER 40 MG/ML
2000 INJECTION, SOLUTION INTRAVENOUS ONCE
Status: COMPLETED | OUTPATIENT
Start: 2024-10-23 | End: 2024-10-23

## 2024-10-23 RX ORDER — OXYCODONE AND ACETAMINOPHEN 5; 325 MG/1; MG/1
1 TABLET ORAL ONCE
Status: COMPLETED | OUTPATIENT
Start: 2024-10-23 | End: 2024-10-23

## 2024-10-23 RX ORDER — POTASSIUM CHLORIDE 7.45 MG/ML
10 INJECTION INTRAVENOUS ONCE
Status: COMPLETED | OUTPATIENT
Start: 2024-10-23 | End: 2024-10-23

## 2024-10-23 RX ORDER — LANOLIN ALCOHOL/MO/W.PET/CERES
100 CREAM (GRAM) TOPICAL ONCE
Status: COMPLETED | OUTPATIENT
Start: 2024-10-23 | End: 2024-10-23

## 2024-10-23 RX ADMIN — METOPROLOL TARTRATE 25 MG: 25 TABLET, FILM COATED ORAL at 03:35

## 2024-10-23 RX ADMIN — OXYCODONE HYDROCHLORIDE AND ACETAMINOPHEN 1 TABLET: 5; 325 TABLET ORAL at 08:32

## 2024-10-23 RX ADMIN — POTASSIUM CHLORIDE 10 MEQ: 7.46 INJECTION, SOLUTION INTRAVENOUS at 05:37

## 2024-10-23 RX ADMIN — MAGNESIUM SULFATE HEPTAHYDRATE 2000 MG: 40 INJECTION, SOLUTION INTRAVENOUS at 05:36

## 2024-10-23 RX ADMIN — POTASSIUM CHLORIDE 40 MEQ: 1500 TABLET, EXTENDED RELEASE ORAL at 05:33

## 2024-10-23 RX ADMIN — Medication 100 MG: at 08:32

## 2024-10-23 ASSESSMENT — PAIN SCALES - GENERAL
PAINLEVEL_OUTOF10: 10
PAINLEVEL_OUTOF10: 10

## 2024-10-23 ASSESSMENT — ENCOUNTER SYMPTOMS
DIARRHEA: 0
SHORTNESS OF BREATH: 0
COUGH: 0
ABDOMINAL PAIN: 0
BACK PAIN: 0
NAUSEA: 0
SORE THROAT: 0
PHOTOPHOBIA: 0

## 2024-10-23 ASSESSMENT — PAIN DESCRIPTION - ORIENTATION: ORIENTATION: RIGHT;LEFT

## 2024-10-23 ASSESSMENT — PAIN - FUNCTIONAL ASSESSMENT: PAIN_FUNCTIONAL_ASSESSMENT: 0-10

## 2024-10-23 ASSESSMENT — PAIN DESCRIPTION - LOCATION: LOCATION: FOOT

## 2024-10-23 NOTE — ED PROVIDER NOTES
[facility] EMERGENCY DEPARTMENT  EMERGENCY DEPARTMENT ENCOUNTER        Pt Name: Dorian Davis  MRN: [id number]  Birthdate 1960  Date of evaluation: 10/23/2024  Provider: Ambreen Villela MD  PCP: Liat Caro APRN - CNP  Note Started: 2:31 AM EDT 10/23/24    HPI  64 y.o. male presenting for foot swelling.  Patient states of bilateral feet swelling that is present for the past few days.  He fell as he is unable to do the swelling.  He did not hit his head when he fell.  He did not lose consciousness.  He complains only of swelling in his feet.  He does admit to alcohol use, last use earlier this afternoon.      --------------------------------------------- PAST HISTORY ---------------------------------------------  Past Medical History:  has a past medical history of Diabetes mellitus (HCC), Hyperlipidemia, and Hypertension.    Past Surgical History:  has a past surgical history that includes hernia repair (2008 & 2013); Appendectomy; Arm Surgery (Left, 10/15/2021); and   picc powerpicc double (10/17/2021).    Social History:  reports that he has quit smoking. His smoking use included cigarettes. He has never used smokeless tobacco. He reports current alcohol use of about 4.0 standard drinks of alcohol per week. He reports that he does not use drugs.    Family History: family history is not on file.     The patient’s home medications have been reviewed.    Allergies: Patient has no known allergies.      Review of Systems   Constitutional:  Negative for chills and fever.   HENT:  Negative for congestion and sore throat.    Eyes:  Negative for photophobia and visual disturbance.   Respiratory:  Negative for cough and shortness of breath.    Cardiovascular:  Negative for chest pain and leg swelling.        Feet swelling   Gastrointestinal:  Negative for abdominal pain, diarrhea and nausea.   Genitourinary:  Negative for dysuria and flank pain.   Musculoskeletal:  Negative

## 2024-10-23 NOTE — CARE COORDINATION
Social Work/Transition of Care:    Patient presents due to bilateral foot pain.  Patient is from home.    SW met with patient at bedside introduced self and role, patient is alert and oriented x 4 patient reports inability to ambulate due to his feet being swollen.  Patient reports he resides alone uses no DME and is independent with his ADLs.  Patient reports for the past 3 days his feet have been swollen and he has been unable to care for himself, social work requested PT/OT evaluations to assist with disposition.    .Electronically signed by HI norris on 10/23/2024 at 10:46 AM       Patient was evaluated by PT/OT recommendation for a wheeled walker was made, patient would not qualify for a inpatient behavioral health admission.  DEEPALI met with patient and discussed returning home with a wheeled walker patient was agreeable for Beulah ROBERTS to be called, social work woke to Kuldeep who will deliver the walker today at bedside.  Pt can be discharged home once he receives his walker, pt reports he has friends he can call for transport.

## 2024-10-23 NOTE — CARE COORDINATION
Social Work/Transition of Care:    SW notified by ER nurse that patient is unable to get transportation home.  SW met with patient to confirm he has his  Cell phone in order to receive text messages, patient confirmed.  SW called provide a ride at [phone].  SW called and scheduled transportation through care source provide a ride confirmation #[id number].  [employer] confirmed patient's telephone number with patient which is different than listed on the chart patient's cell phone number is [phone].  Patient is aware that he received a text message and will have 10 minutes to meet the , if he is not ready the  will leave.    ..Electronically signed by IH norris on 10/23/2024 at 4:36 PM

## 2024-10-23 NOTE — PROGRESS NOTES
Physical Therapy  Facility/Department: [facility] EMERGENCY DEPARTMENT  Physical Therapy Initial Assessment    Name: Dorian Davis  : 1960  MRN: [id number]  Date of Service: 10/23/2024    Patient Diagnosis(es): The encounter diagnosis was Bilateral foot pain.  Past Medical History:  has a past medical history of Diabetes mellitus (HCC), Hyperlipidemia, and Hypertension.  Past Surgical History:  has a past surgical history that includes hernia repair ( & ); Appendectomy; Arm Surgery (Left, 10/15/2021); and   picc powerpicc double (10/17/2021).    Referring provider:  Ambreen Villela MD    PT Order:  PT eval and treat     Evaluating PT:  Isabela Bush PT, DPT PT [id number]    Room #:    Precautions:  fall risk  Equipment Needs:  w/w    SUBJECTIVE:    Pt lives alone in a 1 story apartment with 2 steps to enter.  Bed and bath is on first floor.  Pt ambulated with no device PTA.    OBJECTIVE:   Initial Evaluation  Date: 10/23 Treatment Short Term/ Long Term   Goals   Was pt agreeable to Eval/treatment? yes     Does pt have pain? B feet throbbing     Bed Mobility  Rolling: independent  Supine to sit: independent  Sit to supine: independent  Scooting: independent  independent   Transfers Sit to stand: supervision  Stand to sit: supervision  Stand pivot: NT  Modified independent   Ambulation    100 feet with w/w SBA   200+ feet with w/w modified independent    Stair negotiation: ascended and descended  NT   2 steps wi th 1 rail modified independent   ROM BLE:  WFL     Strength BLE:  grossly 4/5  Grossly 4+/5   Balance Sitting EOB:  independent  Dynamic Standing:  SBA with w/w  Sitting EOB:  independent   Dynamic Standing:  modified independent with w/w   AM-PAC 6 Clicks        Pt is A & O x 3  Sensation:  Pt denies numbness and tingling to extremities    Patient education  Pt educated on PT objectives during eval and while in the hospital, walker usage 
established goals     Patient / Family Goal: none stated       Patient and/or family were instructed on functional diagnosis, prognosis/goals and OT plan of care. Demonstrated fair + understanding.     Eval Complexity: Low    Time In: 1035  Time Out: 1050      Min Units   OT Eval Low 97165 x  1   OT Eval Medium 80569      OT Eval High 82386      OT Re-Eval 40516       Therapeutic Ex 81288      Therapeutic Activities 92346      ADL/Self Care 10565       Orthotic Management 46825       Manual 97913     Neuro Re-Ed 11328       Non-Billable Time      OT Re eval 79180        Evaluation Time additionally includes thorough review of current medical information, gathering information on past medical history/social history and prior level of function, interpretation of standardized testing/informal observation of tasks, assessment of data and development of plan of care and goals.            Solange Olivera  OTR/L  OT [id number]

## 2024-10-25 LAB
EKG ATRIAL RATE: 110 BPM
EKG P AXIS: 41 DEGREES
EKG P-R INTERVAL: 158 MS
EKG Q-T INTERVAL: 372 MS
EKG QRS DURATION: 90 MS
EKG QTC CALCULATION (BAZETT): 503 MS
EKG R AXIS: -73 DEGREES
EKG T AXIS: 62 DEGREES
EKG VENTRICULAR RATE: 110 BPM

## 2024-10-25 PROCEDURE — 93010 ELECTROCARDIOGRAM REPORT: CPT | Performed by: INTERNAL MEDICINE

## 2025-01-01 ENCOUNTER — HOSPITAL ENCOUNTER (EMERGENCY)
Age: 65
Discharge: HOME | End: 2025-01-01
Attending: EMERGENCY MEDICINE
Payer: COMMERCIAL

## 2025-01-01 ENCOUNTER — APPOINTMENT (OUTPATIENT)
Dept: GENERAL RADIOLOGY | Age: 65
End: 2025-01-01
Payer: COMMERCIAL

## 2025-01-01 ENCOUNTER — APPOINTMENT (OUTPATIENT)
Dept: CT IMAGING | Age: 65
End: 2025-01-01
Payer: COMMERCIAL

## 2025-01-01 VITALS
DIASTOLIC BLOOD PRESSURE: 62 MMHG | SYSTOLIC BLOOD PRESSURE: 96 MMHG | BODY MASS INDEX: 21.69 KG/M2 | HEIGHT: 66 IN | OXYGEN SATURATION: 95 % | RESPIRATION RATE: 14 BRPM | TEMPERATURE: 97.5 F | WEIGHT: 135 LBS | HEART RATE: 105 BPM

## 2025-01-01 DIAGNOSIS — F10.929 ACUTE ALCOHOLIC INTOXICATION WITH COMPLICATION (HCC): Primary | ICD-10-CM
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
## 2025-01-01 LAB
ALBUMIN SERPL-MCNC: 4 G/DL (ref 3.5–5.2)
ALP SERPL-CCNC: 79 U/L (ref 40–129)
ALT SERPL-CCNC: 19 U/L (ref 0–40)
ANION GAP SERPL CALCULATED.3IONS-SCNC: 14 MMOL/L (ref 7–16)
AST SERPL-CCNC: 26 U/L (ref 0–39)
BASOPHILS # BLD: 0.05 K/UL (ref 0–0.2)
BASOPHILS NFR BLD: 1 % (ref 0–2)
BILIRUB SERPL-MCNC: <0.2 MG/DL (ref 0–1.2)
BUN SERPL-MCNC: 21 MG/DL (ref 6–23)
CALCIUM SERPL-MCNC: 8.3 MG/DL (ref 8.6–10.2)
CHLORIDE SERPL-SCNC: 110 MMOL/L (ref 98–107)
CHP ED QC CHECK: YES
CO2 SERPL-SCNC: 22 MMOL/L (ref 22–29)
CREAT SERPL-MCNC: 0.7 MG/DL (ref 0.7–1.2)
EOSINOPHIL # BLD: 0.14 K/UL (ref 0.05–0.5)
EOSINOPHILS RELATIVE PERCENT: 2 % (ref 0–6)
ERYTHROCYTE [DISTWIDTH] IN BLOOD BY AUTOMATED COUNT: 13.4 % (ref 11.5–15)
GFR, ESTIMATED: >90 ML/MIN/1.73M2
GLUCOSE BLD-MCNC: 104 MG/DL
GLUCOSE BLD-MCNC: 104 MG/DL (ref 74–99)
GLUCOSE SERPL-MCNC: 110 MG/DL (ref 74–99)
HCT VFR BLD AUTO: 38 % (ref 37–54)
HGB BLD-MCNC: 12.7 G/DL (ref 12.5–16.5)
IMM GRANULOCYTES # BLD AUTO: 0.04 K/UL (ref 0–0.58)
IMM GRANULOCYTES NFR BLD: 1 % (ref 0–5)
LIPASE SERPL-CCNC: 63 U/L (ref 13–60)
LYMPHOCYTES NFR BLD: 3.1 K/UL (ref 1.5–4)
LYMPHOCYTES RELATIVE PERCENT: 39 % (ref 20–42)
MCH RBC QN AUTO: 31.6 PG (ref 26–35)
MCHC RBC AUTO-ENTMCNC: 33.4 G/DL (ref 32–34.5)
MCV RBC AUTO: 94.5 FL (ref 80–99.9)
MONOCYTES NFR BLD: 0.48 K/UL (ref 0.1–0.95)
MONOCYTES NFR BLD: 6 % (ref 2–12)
NEUTROPHILS NFR BLD: 52 % (ref 43–80)
NEUTS SEG NFR BLD: 4.1 K/UL (ref 1.8–7.3)
PLATELET # BLD AUTO: 155 K/UL (ref 130–450)
PMV BLD AUTO: 9.3 FL (ref 7–12)
POTASSIUM SERPL-SCNC: 3.9 MMOL/L (ref 3.5–5)
PROT SERPL-MCNC: 6.8 G/DL (ref 6.4–8.3)
RBC # BLD AUTO: 4.02 M/UL (ref 3.8–5.8)
SODIUM SERPL-SCNC: 146 MMOL/L (ref 132–146)
TROPONIN I SERPL HS-MCNC: 21 NG/L (ref 0–11)
WBC OTHER # BLD: 7.9 K/UL (ref 4.5–11.5)

## 2025-01-01 PROCEDURE — 72170 X-RAY EXAM OF PELVIS: CPT

## 2025-01-01 PROCEDURE — 93005 ELECTROCARDIOGRAM TRACING: CPT

## 2025-01-01 PROCEDURE — 85025 COMPLETE CBC W/AUTO DIFF WBC: CPT

## 2025-01-01 PROCEDURE — 71045 X-RAY EXAM CHEST 1 VIEW: CPT

## 2025-01-01 PROCEDURE — 83690 ASSAY OF LIPASE: CPT

## 2025-01-01 PROCEDURE — 84484 ASSAY OF TROPONIN QUANT: CPT

## 2025-01-01 PROCEDURE — 99285 EMERGENCY DEPT VISIT HI MDM: CPT

## 2025-01-01 PROCEDURE — 82962 GLUCOSE BLOOD TEST: CPT

## 2025-01-01 PROCEDURE — 80053 COMPREHEN METABOLIC PANEL: CPT

## 2025-01-01 PROCEDURE — 72125 CT NECK SPINE W/O DYE: CPT

## 2025-01-01 PROCEDURE — 70450 CT HEAD/BRAIN W/O DYE: CPT

## 2025-01-01 RX ORDER — 0.9 % SODIUM CHLORIDE 0.9 %
1000 INTRAVENOUS SOLUTION INTRAVENOUS ONCE
Status: DISCONTINUED | OUTPATIENT
Start: 2025-01-01 | End: 2025-01-01 | Stop reason: HOSPADM

## 2025-01-01 ASSESSMENT — LIFESTYLE VARIABLES
HOW OFTEN DO YOU HAVE A DRINK CONTAINING ALCOHOL: PATIENT DECLINED
HOW MANY STANDARD DRINKS CONTAINING ALCOHOL DO YOU HAVE ON A TYPICAL DAY: PATIENT DECLINED

## 2025-01-01 ASSESSMENT — PAIN DESCRIPTION - LOCATION: LOCATION: BACK;HEAD

## 2025-01-01 ASSESSMENT — PAIN - FUNCTIONAL ASSESSMENT: PAIN_FUNCTIONAL_ASSESSMENT: 0-10

## 2025-01-01 NOTE — ED NOTES
Patient refusing iv fluids, vital signs, did eat and drink. Patient also getting loud and attempting to walk out of ER. This RN attempting to redirect patient, [facility].   aware of patient behavior and refusals. Patient bother coming to pick him up

## 2025-01-01 NOTE — ED PROVIDER NOTES
Topics    Alcohol use: Yes     Alcohol/week: 4.0 standard drinks of alcohol     Types: 4 Cans of beer per week    Drug use: Never       SCREENINGS        [place] Coma Scale  Eye Opening: Spontaneous  Best Verbal Response: Oriented  Best Motor Response: Obeys commands  [place] Coma Scale Score: 15                CIWA Assessment  BP: 96/62  Pulse: (!) 105           PHYSICAL EXAM  1 or more Elements     ED Triage Vitals   BP Systolic BP Percentile Diastolic BP Percentile Temp Temp src Pulse Resp SpO2   -- -- -- -- -- -- -- --      Height Weight         -- --                   Constitutional/General: Alert and oriented x3, appears clinically intoxicated.  Head: Normocephalic and atraumatic  Eyes: sclera non icteric,   ENT:  Oropharynx clear, handling secretions.  Neck: Supple, no JVD  Respiratory: Pt not in respiratory distress. Normal work of breathing. Lungs clear to auscultation bilaterally.  Cardiovascular: Tachycardic. Regular rhythm. Distal pulses intact.   Chest: No chest wall tenderness  GI:  Abdomen Soft, Non tender, Non distended.  No rebound, guarding, or rigidity.   Musculoskeletal: Moves all extremities x 4. No Swelling.   Integument: skin warm and dry. No rashes.   Neurologic: GCS 15, no unilateral weakness.   Psychiatric: Normal Affect            DIAGNOSTIC RESULTS   LABS: Interpreted by Ehsan Holbrook II, DO    Labs Reviewed   TROPONIN - Abnormal; Notable for the following components:       Result Value    Troponin, High Sensitivity 21 (*)     All other components within normal limits   COMPREHENSIVE METABOLIC PANEL W/ REFLEX TO MG FOR LOW K - Abnormal; Notable for the following components:    Chloride 110 (*)     Glucose 110 (*)     Calcium 8.3 (*)     All other components within normal limits   LIPASE - Abnormal; Notable for the following components:    Lipase 63 (*)     All other components within normal limits   POCT GLUCOSE - Abnormal; Notable for the following components:    POC Glucose 104 (*)

## 2025-01-02 LAB
EKG ATRIAL RATE: 108 BPM
EKG P AXIS: 65 DEGREES
EKG P-R INTERVAL: 140 MS
EKG Q-T INTERVAL: 348 MS
EKG QRS DURATION: 88 MS
EKG QTC CALCULATION (BAZETT): 466 MS
EKG R AXIS: -70 DEGREES
EKG T AXIS: 50 DEGREES
EKG VENTRICULAR RATE: 108 BPM

## 2025-01-02 PROCEDURE — 93010 ELECTROCARDIOGRAM REPORT: CPT | Performed by: INTERNAL MEDICINE

## (undated) DEVICE — ELECTROSURGICAL PENCIL BUTTON SWITCH E-Z CLEAN COATED BLADE ELECTRODE 10 FT (3 M) CORD HOLSTER: Brand: MEGADYNE

## (undated) DEVICE — PADDING CAST W6INXL4YD COT LO LINTING WYTEX

## (undated) DEVICE — DRAPE,U/ SHT,SPLIT,PLAS,STERIL: Brand: MEDLINE

## (undated) DEVICE — SURGICAL PROCEDURE PACK HND

## (undated) DEVICE — SOLUTION IV IRRIG 1000ML POUR BTL 2F7114

## (undated) DEVICE — DOUBLE BASIN SET: Brand: MEDLINE INDUSTRIES, INC.

## (undated) DEVICE — TUBE IRRIG HNDPC HI FLO TP INTRPULS W/SUCTION TUBE

## (undated) DEVICE — GLOVE ORANGE PI 8 1/2   MSG9085

## (undated) DEVICE — GOWN,SIRUS,FABRNF,XL,20/CS: Brand: MEDLINE

## (undated) DEVICE — BAG SPECIMEN BIOHAZARD 6IN X 9IN

## (undated) DEVICE — SOLUTION IV 1000ML 0.9% SOD CHL PH 5 INJ USP VIAFLX PLAS

## (undated) DEVICE — GAUZE,SPONGE,4"X4",8PLY,STRL,LF,10/TRAY: Brand: MEDLINE

## (undated) DEVICE — BANDAGE COMPR W3INXL5YD WHT BGE POLY COT M E WRP WV HK AND

## (undated) DEVICE — 4-PORT MANIFOLD: Brand: NEPTUNE 2

## (undated) DEVICE — GAUZE,PACKING STRIP,IODOFORM,1/2"X5YD,ST: Brand: CURAD

## (undated) DEVICE — COVER HNDL LT DISP

## (undated) DEVICE — SOLUTION IRRIG 3000ML 0.9% SOD CHL USP UROMATIC PLAS CONT

## (undated) DEVICE — GLOVE SURG SZ 6 THK91MIL LTX FREE SYN POLYISOPRENE ANTI

## (undated) DEVICE — INTENDED FOR TISSUE SEPARATION, AND OTHER PROCEDURES THAT REQUIRE A SHARP SURGICAL BLADE TO PUNCTURE OR CUT.: Brand: BARD-PARKER ® STAINLESS STEEL BLADES

## (undated) DEVICE — SPLINT ORTH W5XL30IN PLSTR OF PARIS LO EXOTHERM SMOOTH

## (undated) DEVICE — TRAY SET HAND REUSABLE

## (undated) DEVICE — CLOTH SURG PREP PREOPERATIVE CHLORHEXIDINE GLUC 2% READYPREP

## (undated) DEVICE — PAD,ABDOMINAL,5"X9",ST,LF,25/BX: Brand: MEDLINE INDUSTRIES, INC.

## (undated) DEVICE — CRADLE ARM W8.75XH12.5XL16IN FOAM SUPP ELEVATION VENT

## (undated) DEVICE — GLOVE SURG SZ 65 THK91MIL LTX FREE SYN POLYISOPRENE

## (undated) DEVICE — LEAD HAND

## (undated) DEVICE — PADDING,UNDERCAST,COTTON, 4"X4YD STERILE: Brand: MEDLINE

## (undated) DEVICE — SPONGE LAP W18XL18IN WHT COT 4 PLY FLD STRUNG RADPQ DISP ST

## (undated) DEVICE — BNDG,ELSTC,MATRIX,STRL,6"X5YD,LF,HOOK&LP: Brand: MEDLINE

## (undated) DEVICE — TRAP SPEC MUCUS FOR SUCT